# Patient Record
Sex: FEMALE | Race: BLACK OR AFRICAN AMERICAN | NOT HISPANIC OR LATINO | Employment: FULL TIME | RURAL
[De-identification: names, ages, dates, MRNs, and addresses within clinical notes are randomized per-mention and may not be internally consistent; named-entity substitution may affect disease eponyms.]

---

## 2020-10-12 ENCOUNTER — HISTORICAL (OUTPATIENT)
Dept: ADMINISTRATIVE | Facility: HOSPITAL | Age: 40
End: 2020-10-12

## 2020-10-18 LAB — SARS-COV+SARS-COV-2 AG RESP QL IA.RAPID: NEGATIVE

## 2021-06-18 ENCOUNTER — OFFICE VISIT (OUTPATIENT)
Dept: FAMILY MEDICINE | Facility: CLINIC | Age: 41
End: 2021-06-18
Payer: COMMERCIAL

## 2021-06-18 VITALS
TEMPERATURE: 98 F | OXYGEN SATURATION: 99 % | BODY MASS INDEX: 32.49 KG/M2 | SYSTOLIC BLOOD PRESSURE: 122 MMHG | RESPIRATION RATE: 18 BRPM | HEART RATE: 90 BPM | DIASTOLIC BLOOD PRESSURE: 83 MMHG | WEIGHT: 195 LBS | HEIGHT: 65 IN

## 2021-06-18 DIAGNOSIS — J32.9 SINUSITIS, UNSPECIFIED CHRONICITY, UNSPECIFIED LOCATION: Primary | ICD-10-CM

## 2021-06-18 DIAGNOSIS — H66.003 NON-RECURRENT ACUTE SUPPURATIVE OTITIS MEDIA OF BOTH EARS WITHOUT SPONTANEOUS RUPTURE OF TYMPANIC MEMBRANES: ICD-10-CM

## 2021-06-18 PROCEDURE — 3008F BODY MASS INDEX DOCD: CPT | Mod: ,,, | Performed by: NURSE PRACTITIONER

## 2021-06-18 PROCEDURE — 1125F PR PAIN SEVERITY QUANTIFIED, PAIN PRESENT: ICD-10-PCS | Mod: ,,, | Performed by: NURSE PRACTITIONER

## 2021-06-18 PROCEDURE — 3008F PR BODY MASS INDEX (BMI) DOCUMENTED: ICD-10-PCS | Mod: ,,, | Performed by: NURSE PRACTITIONER

## 2021-06-18 PROCEDURE — 99203 OFFICE O/P NEW LOW 30 MIN: CPT | Mod: ,,, | Performed by: NURSE PRACTITIONER

## 2021-06-18 PROCEDURE — 1125F AMNT PAIN NOTED PAIN PRSNT: CPT | Mod: ,,, | Performed by: NURSE PRACTITIONER

## 2021-06-18 PROCEDURE — 99203 PR OFFICE/OUTPT VISIT, NEW, LEVL III, 30-44 MIN: ICD-10-PCS | Mod: ,,, | Performed by: NURSE PRACTITIONER

## 2021-06-18 RX ORDER — HYDROCHLOROTHIAZIDE 12.5 MG/1
12.5 CAPSULE ORAL DAILY
COMMUNITY
End: 2024-01-09 | Stop reason: SDUPTHER

## 2021-06-18 RX ORDER — CEFDINIR 300 MG/1
300 CAPSULE ORAL 2 TIMES DAILY
Qty: 20 CAPSULE | Refills: 0 | Status: SHIPPED | OUTPATIENT
Start: 2021-06-18 | End: 2022-10-31

## 2021-06-18 RX ORDER — PREDNISONE 10 MG/1
TABLET ORAL
Qty: 8 TABLET | Refills: 0 | Status: SHIPPED | OUTPATIENT
Start: 2021-06-18 | End: 2021-11-06

## 2021-11-06 ENCOUNTER — OFFICE VISIT (OUTPATIENT)
Dept: FAMILY MEDICINE | Facility: CLINIC | Age: 41
End: 2021-11-06
Payer: COMMERCIAL

## 2021-11-06 DIAGNOSIS — J01.90 ACUTE NON-RECURRENT SINUSITIS, UNSPECIFIED LOCATION: Primary | ICD-10-CM

## 2021-11-06 PROCEDURE — 1160F RVW MEDS BY RX/DR IN RCRD: CPT | Mod: ,,, | Performed by: FAMILY MEDICINE

## 2021-11-06 PROCEDURE — 1159F MED LIST DOCD IN RCRD: CPT | Mod: ,,, | Performed by: FAMILY MEDICINE

## 2021-11-06 PROCEDURE — 99051 MED SERV EVE/WKEND/HOLIDAY: CPT | Mod: ,,, | Performed by: FAMILY MEDICINE

## 2021-11-06 PROCEDURE — 1160F PR REVIEW ALL MEDS BY PRESCRIBER/CLIN PHARMACIST DOCUMENTED: ICD-10-PCS | Mod: ,,, | Performed by: FAMILY MEDICINE

## 2021-11-06 PROCEDURE — 99051 PR MEDICAL SERVICES, EVE/WKEND/HOLIDAY: ICD-10-PCS | Mod: ,,, | Performed by: FAMILY MEDICINE

## 2021-11-06 PROCEDURE — 99213 PR OFFICE/OUTPT VISIT, EST, LEVL III, 20-29 MIN: ICD-10-PCS | Mod: ,,, | Performed by: FAMILY MEDICINE

## 2021-11-06 PROCEDURE — 99213 OFFICE O/P EST LOW 20 MIN: CPT | Mod: ,,, | Performed by: FAMILY MEDICINE

## 2021-11-06 PROCEDURE — 1159F PR MEDICATION LIST DOCUMENTED IN MEDICAL RECORD: ICD-10-PCS | Mod: ,,, | Performed by: FAMILY MEDICINE

## 2021-11-06 RX ORDER — AMOXICILLIN 875 MG/1
875 TABLET, FILM COATED ORAL EVERY 12 HOURS
Qty: 20 TABLET | Refills: 0 | Status: SHIPPED | OUTPATIENT
Start: 2021-11-06 | End: 2022-10-31

## 2021-11-06 RX ORDER — PREDNISONE 20 MG/1
TABLET ORAL
Qty: 10 TABLET | Refills: 0 | Status: SHIPPED | OUTPATIENT
Start: 2021-11-06 | End: 2022-10-31

## 2022-01-05 ENCOUNTER — CLINICAL SUPPORT (OUTPATIENT)
Dept: PRIMARY CARE CLINIC | Facility: CLINIC | Age: 42
End: 2022-01-05

## 2022-01-05 DIAGNOSIS — Z02.83 ENCOUNTER FOR DRUG SCREENING: ICD-10-CM

## 2022-01-05 DIAGNOSIS — Z02.89 ENCOUNTER FOR PHYSICAL EXAMINATION RELATED TO EMPLOYMENT: Primary | ICD-10-CM

## 2022-01-05 PROCEDURE — 99000 SPECIMEN HANDLING OFFICE-LAB: CPT | Mod: ,,, | Performed by: NURSE PRACTITIONER

## 2022-01-05 PROCEDURE — 99499 UNLISTED E&M SERVICE: CPT | Mod: ,,, | Performed by: NURSE PRACTITIONER

## 2022-01-05 PROCEDURE — 99000 PR SPECIMEN HANDLING,DR OFF->LAB: ICD-10-PCS | Mod: ,,, | Performed by: NURSE PRACTITIONER

## 2022-01-05 PROCEDURE — 99499 PR PHYSICAL - BASIC NON DOT/CDL: ICD-10-PCS | Mod: ,,, | Performed by: NURSE PRACTITIONER

## 2022-01-05 NOTE — PROGRESS NOTES
Subjective:       Patient ID: Haleigh Newman is a 41 y.o. female.    Chief Complaint: No chief complaint on file.    HPI  Review of Systems      Objective:      Physical Exam    Assessment:       Problem List Items Addressed This Visit    None     Visit Diagnoses     Encounter for physical examination related to employment    -  Primary    Encounter for drug screening              Plan:       See scanned documents in .

## 2022-10-31 ENCOUNTER — OFFICE VISIT (OUTPATIENT)
Dept: FAMILY MEDICINE | Facility: CLINIC | Age: 42
End: 2022-10-31
Payer: COMMERCIAL

## 2022-10-31 VITALS
HEIGHT: 65 IN | HEART RATE: 89 BPM | BODY MASS INDEX: 30.82 KG/M2 | DIASTOLIC BLOOD PRESSURE: 81 MMHG | OXYGEN SATURATION: 98 % | WEIGHT: 185 LBS | SYSTOLIC BLOOD PRESSURE: 122 MMHG

## 2022-10-31 DIAGNOSIS — M25.532 LEFT WRIST PAIN: Primary | ICD-10-CM

## 2022-10-31 DIAGNOSIS — R20.0 NUMBNESS AND TINGLING IN LEFT ARM: ICD-10-CM

## 2022-10-31 DIAGNOSIS — R20.2 NUMBNESS AND TINGLING IN LEFT ARM: ICD-10-CM

## 2022-10-31 DIAGNOSIS — F17.200 CURRENT SMOKER: ICD-10-CM

## 2022-10-31 PROCEDURE — 3074F SYST BP LT 130 MM HG: CPT | Mod: ,,, | Performed by: NURSE PRACTITIONER

## 2022-10-31 PROCEDURE — 3079F DIAST BP 80-89 MM HG: CPT | Mod: ,,, | Performed by: NURSE PRACTITIONER

## 2022-10-31 PROCEDURE — 1159F PR MEDICATION LIST DOCUMENTED IN MEDICAL RECORD: ICD-10-PCS | Mod: ,,, | Performed by: NURSE PRACTITIONER

## 2022-10-31 PROCEDURE — 1160F RVW MEDS BY RX/DR IN RCRD: CPT | Mod: ,,, | Performed by: NURSE PRACTITIONER

## 2022-10-31 PROCEDURE — 3079F PR MOST RECENT DIASTOLIC BLOOD PRESSURE 80-89 MM HG: ICD-10-PCS | Mod: ,,, | Performed by: NURSE PRACTITIONER

## 2022-10-31 PROCEDURE — 99213 OFFICE O/P EST LOW 20 MIN: CPT | Mod: ,,, | Performed by: NURSE PRACTITIONER

## 2022-10-31 PROCEDURE — 3044F HG A1C LEVEL LT 7.0%: CPT | Mod: ,,, | Performed by: NURSE PRACTITIONER

## 2022-10-31 PROCEDURE — 1159F MED LIST DOCD IN RCRD: CPT | Mod: ,,, | Performed by: NURSE PRACTITIONER

## 2022-10-31 PROCEDURE — 3074F PR MOST RECENT SYSTOLIC BLOOD PRESSURE < 130 MM HG: ICD-10-PCS | Mod: ,,, | Performed by: NURSE PRACTITIONER

## 2022-10-31 PROCEDURE — 3044F PR MOST RECENT HEMOGLOBIN A1C LEVEL <7.0%: ICD-10-PCS | Mod: ,,, | Performed by: NURSE PRACTITIONER

## 2022-10-31 PROCEDURE — 1160F PR REVIEW ALL MEDS BY PRESCRIBER/CLIN PHARMACIST DOCUMENTED: ICD-10-PCS | Mod: ,,, | Performed by: NURSE PRACTITIONER

## 2022-10-31 PROCEDURE — 99213 PR OFFICE/OUTPT VISIT, EST, LEVL III, 20-29 MIN: ICD-10-PCS | Mod: ,,, | Performed by: NURSE PRACTITIONER

## 2022-10-31 RX ORDER — MELOXICAM 7.5 MG/1
7.5 TABLET ORAL DAILY
Qty: 30 TABLET | Refills: 0 | Status: SHIPPED | OUTPATIENT
Start: 2022-10-31 | End: 2023-10-10 | Stop reason: ALTCHOICE

## 2022-10-31 NOTE — PATIENT INSTRUCTIONS
Left wrist brace when sleeping   Mobic daily, take with food, stop if any stomach upset occurs   Follow up in two weeks if symptoms persist, sooner if symptoms worsen

## 2022-10-31 NOTE — PROGRESS NOTES
Clinic note     Patient name: Haleigh eNwman is a 42 y.o. female   Chief compliant   Chief Complaint   Patient presents with    Hand Pain     Started Friday right below the shoulder down to finger tips tingling/ shooting pains. No known injury. No known neck problems.     Insomnia     Took trazdone in the past, uses the cpap at night.        Subjective     History of present illness   In clinic for evaluation of left arm and left hand tingling, symptoms started on Friday (four days ago)   Denies any known injury, denies any history of neck injury or neck or shoulder surgery   Works at [employer] 11p-7a and [employer] 8a-3p [employer] and [employer]   [facility] 10-        Social History     Tobacco Use    Smoking status: Some Days     Packs/day: 0.25     Years: 16.00     Pack years: 4.00     Types: Cigarettes    Smokeless tobacco: Never    Tobacco comments:     Has taken chantix in the past.    Substance Use Topics    Alcohol use: Never    Drug use: Never       Review of patient's allergies indicates:  No Known Allergies    Past Medical History:   Diagnosis Date    Hypertension        Past Surgical History:   Procedure Laterality Date     SECTION          Family History   Problem Relation Age of Onset    Hypertension Mother     Thyroid disease Mother     Hypertension Father     Thyroid disease Sister     Cancer Son     Thyroid disease Maternal Aunt     Cancer Paternal Aunt     Hypertension Maternal Grandmother     Cancer Maternal Grandmother     Diabetes Maternal Grandmother     Hypertension Maternal Grandfather     Diabetes Maternal Grandfather     Hypertension Paternal Grandmother     Cancer Paternal Grandmother     Diabetes Paternal Grandmother     Hypertension Paternal Grandfather     Diabetes Paternal Grandfather          Current Outpatient Medications:     hydroCHLOROthiazide (MICROZIDE) 12.5 mg capsule, Take 12.5 mg by mouth once daily., Disp: , Rfl:     meloxicam (MOBIC) 7.5 MG tablet, Take 1 tablet (7.5 mg  "total) by mouth once daily. Take with food, Disp: 30 tablet, Rfl: 0    Review of Systems   Constitutional:  Negative for appetite change, chills, fatigue, fever and unexpected weight change.   Respiratory:  Negative for cough and shortness of breath.    Cardiovascular:  Negative for chest pain, palpitations and leg swelling.   Gastrointestinal:  Negative for abdominal pain, change in bowel habit, constipation, diarrhea, nausea, vomiting and change in bowel habit.   Genitourinary:  Negative for dysuria and frequency.   Musculoskeletal:  Positive for arthralgias. Negative for gait problem and myalgias.   Neurological:  Negative for dizziness, syncope, light-headedness and headaches.        Tingling left arm from just above elbow to hand    Psychiatric/Behavioral:  Positive for sleep disturbance. Negative for confusion and dysphoric mood. The patient is not nervous/anxious.      Objective     /81   Pulse 89   Ht 5' 5" (1.651 m)   Wt 83.9 kg (185 lb)   LMP 10/06/2022 (Approximate)   SpO2 98%   BMI 30.79 kg/m²     Physical Exam   Constitutional: She is oriented to person, place, and time. No distress.   HENT:   Head: Atraumatic.   Mouth/Throat: Mucous membranes are moist.   Eyes: Pupils are equal, round, and reactive to light. Conjunctivae are normal.   Cardiovascular: Normal rate and regular rhythm. Pulmonary:      Effort: Pulmonary effort is normal. No respiratory distress.      Breath sounds: Normal breath sounds. No wheezing, rhonchi or rales.     Abdominal: Soft. Bowel sounds are normal. She exhibits no distension. There is no abdominal tenderness.   Musculoskeletal:         General: Normal range of motion.      Right elbow: Normal.      Left elbow: No swelling or deformity. Tenderness present in lateral epicondyle.      Right wrist: Normal.      Left wrist: Tenderness and snuff box tenderness present.      Cervical back: Normal range of motion and neck supple.      Right lower leg: No edema.      Left " lower leg: No edema.   Neurological: She is alert and oriented to person, place, and time. Gait normal.   Skin: Skin is warm and dry.   Psychiatric: Her behavior is normal. Mood normal.     Lab Results   Component Value Date    WBC 9.19 07/14/2022    HGB 12.7 07/14/2022    HCT 37.6 (L) 07/14/2022    MCV 90.2 07/14/2022     07/14/2022       CMP  Sodium   Date Value Ref Range Status   07/14/2022 139 136 - 145 mmol/L Final     Potassium   Date Value Ref Range Status   07/14/2022 3.5 3.5 - 5.1 mmol/L Final     Chloride   Date Value Ref Range Status   07/14/2022 103 98 - 107 mmol/L Final     CO2   Date Value Ref Range Status   07/14/2022 30 21 - 32 mmol/L Final     Glucose   Date Value Ref Range Status   07/14/2022 74 74 - 106 mg/dL Final     BUN   Date Value Ref Range Status   07/14/2022 14 7 - 18 mg/dL Final     Creatinine   Date Value Ref Range Status   07/14/2022 0.83 0.55 - 1.02 mg/dL Final     Calcium   Date Value Ref Range Status   07/14/2022 8.9 8.5 - 10.1 mg/dL Final     Total Protein   Date Value Ref Range Status   07/14/2022 7.9 6.4 - 8.2 g/dL Final     Albumin   Date Value Ref Range Status   07/14/2022 4.1 3.5 - 5.0 g/dL Final     Bilirubin, Total   Date Value Ref Range Status   07/14/2022 0.2 0.0 - 1.2 mg/dL Final     Alk Phos   Date Value Ref Range Status   07/14/2022 85 37 - 98 U/L Final     AST   Date Value Ref Range Status   07/14/2022 18 15 - 37 U/L Final     ALT   Date Value Ref Range Status   07/14/2022 24 13 - 56 U/L Final     Anion Gap   Date Value Ref Range Status   07/14/2022 10 7 - 16 mmol/L Final     eGFR   Date Value Ref Range Status   07/14/2022 81 >=60 mL/min/1.73m² Final     Lab Results   Component Value Date    TSH 1.070 07/14/2022     No results found for: CHOL  No results found for: HDL  No results found for: LDLCALC  No results found for: TRIG  No results found for: CHOLHDL  Lab Results   Component Value Date    HGBA1C 5.1 07/14/2022         Assessment and Plan   Left wrist  pain  -     meloxicam (MOBIC) 7.5 MG tablet; Take 1 tablet (7.5 mg total) by mouth once daily. Take with food  Dispense: 30 tablet; Refill: 0    Current smoker    Numbness and tingling in left arm  -     meloxicam (MOBIC) 7.5 MG tablet; Take 1 tablet (7.5 mg total) by mouth once daily. Take with food  Dispense: 30 tablet; Refill: 0    BMI 30.0-30.9,adult        Patient Instructions  Patient Instructions   Left wrist brace when sleeping   Mobic daily, take with food, stop if any stomach upset occurs   Follow up in two weeks if symptoms persist, sooner if symptoms worsen

## 2022-10-31 NOTE — LETTER
October 31, 2022      [facility]  [address] [place] [address]  Phone: [phone]  Fax: [phone]       Patient: Haleigh Newman   YOB: 1980  Date of Visit: 10/31/2022    To Whom It May Concern:    Ozzy Newman  was at [facility] on 10/31/2022. The patient may return to work/school on 11/2/2022  If you have any questions or concerns, or if I can be of further assistance, please do not hesitate to contact me.    Sincerely,    JENNIFER LuevanoP

## 2023-03-02 ENCOUNTER — OFFICE VISIT (OUTPATIENT)
Dept: FAMILY MEDICINE | Facility: CLINIC | Age: 43
End: 2023-03-02
Payer: COMMERCIAL

## 2023-03-02 VITALS
RESPIRATION RATE: 18 BRPM | HEART RATE: 80 BPM | HEIGHT: 65 IN | TEMPERATURE: 98 F | OXYGEN SATURATION: 98 % | SYSTOLIC BLOOD PRESSURE: 132 MMHG | BODY MASS INDEX: 30.49 KG/M2 | DIASTOLIC BLOOD PRESSURE: 86 MMHG | WEIGHT: 183 LBS

## 2023-03-02 DIAGNOSIS — J01.00 ACUTE NON-RECURRENT MAXILLARY SINUSITIS: Primary | ICD-10-CM

## 2023-03-02 PROCEDURE — 99213 PR OFFICE/OUTPT VISIT, EST, LEVL III, 20-29 MIN: ICD-10-PCS | Mod: 25,,, | Performed by: NURSE PRACTITIONER

## 2023-03-02 PROCEDURE — 96372 THER/PROPH/DIAG INJ SC/IM: CPT | Mod: JZ,,, | Performed by: NURSE PRACTITIONER

## 2023-03-02 PROCEDURE — 3079F DIAST BP 80-89 MM HG: CPT | Mod: ,,, | Performed by: NURSE PRACTITIONER

## 2023-03-02 PROCEDURE — 3075F SYST BP GE 130 - 139MM HG: CPT | Mod: ,,, | Performed by: NURSE PRACTITIONER

## 2023-03-02 PROCEDURE — 3008F PR BODY MASS INDEX (BMI) DOCUMENTED: ICD-10-PCS | Mod: ,,, | Performed by: NURSE PRACTITIONER

## 2023-03-02 PROCEDURE — 3075F PR MOST RECENT SYSTOLIC BLOOD PRESS GE 130-139MM HG: ICD-10-PCS | Mod: ,,, | Performed by: NURSE PRACTITIONER

## 2023-03-02 PROCEDURE — 3079F PR MOST RECENT DIASTOLIC BLOOD PRESSURE 80-89 MM HG: ICD-10-PCS | Mod: ,,, | Performed by: NURSE PRACTITIONER

## 2023-03-02 PROCEDURE — 1159F MED LIST DOCD IN RCRD: CPT | Mod: ,,, | Performed by: NURSE PRACTITIONER

## 2023-03-02 PROCEDURE — 1159F PR MEDICATION LIST DOCUMENTED IN MEDICAL RECORD: ICD-10-PCS | Mod: ,,, | Performed by: NURSE PRACTITIONER

## 2023-03-02 PROCEDURE — 99213 OFFICE O/P EST LOW 20 MIN: CPT | Mod: 25,,, | Performed by: NURSE PRACTITIONER

## 2023-03-02 PROCEDURE — 96372 PR INJECTION,THERAP/PROPH/DIAG2ST, IM OR SUBCUT: ICD-10-PCS | Mod: JZ,,, | Performed by: NURSE PRACTITIONER

## 2023-03-02 PROCEDURE — 3008F BODY MASS INDEX DOCD: CPT | Mod: ,,, | Performed by: NURSE PRACTITIONER

## 2023-03-02 RX ORDER — ESCITALOPRAM OXALATE 10 MG/1
10 TABLET ORAL
COMMUNITY
Start: 2023-01-26 | End: 2024-02-07 | Stop reason: SDUPTHER

## 2023-03-02 RX ORDER — HYDROCHLOROTHIAZIDE 12.5 MG/1
12.5 TABLET ORAL
COMMUNITY
Start: 2022-12-03 | End: 2024-01-09

## 2023-03-02 RX ORDER — TIZANIDINE 4 MG/1
4 TABLET ORAL NIGHTLY
COMMUNITY
Start: 2023-02-27

## 2023-03-02 RX ORDER — DEXAMETHASONE SODIUM PHOSPHATE 4 MG/ML
8 INJECTION, SOLUTION INTRA-ARTICULAR; INTRALESIONAL; INTRAMUSCULAR; INTRAVENOUS; SOFT TISSUE ONCE
Status: COMPLETED | OUTPATIENT
Start: 2023-03-02 | End: 2023-03-02

## 2023-03-02 RX ORDER — CEFDINIR 300 MG/1
300 CAPSULE ORAL 2 TIMES DAILY
Qty: 20 CAPSULE | Refills: 0 | Status: SHIPPED | OUTPATIENT
Start: 2023-03-02 | End: 2023-03-12

## 2023-03-02 RX ORDER — CEFTRIAXONE 1 G/1
1 INJECTION, POWDER, FOR SOLUTION INTRAMUSCULAR; INTRAVENOUS
Status: COMPLETED | OUTPATIENT
Start: 2023-03-02 | End: 2023-03-02

## 2023-03-02 RX ORDER — TRAZODONE HYDROCHLORIDE 50 MG/1
50 TABLET ORAL NIGHTLY
COMMUNITY
Start: 2023-02-27 | End: 2024-01-09

## 2023-03-02 RX ORDER — CHLORPHENIRAMINE MALEATE AND DEXTROMETHORPHAN HYDROBROMIDE 4; 30 MG/1; MG/1
1 TABLET, FILM COATED ORAL EVERY 6 HOURS
Qty: 20 EACH | Refills: 1 | Status: SHIPPED | OUTPATIENT
Start: 2023-03-02 | End: 2023-03-12

## 2023-03-02 RX ORDER — IPRATROPIUM BROMIDE 21 UG/1
2 SPRAY, METERED NASAL 2 TIMES DAILY
Qty: 30 ML | Refills: 0 | Status: SHIPPED | OUTPATIENT
Start: 2023-03-02 | End: 2024-01-09

## 2023-03-02 RX ADMIN — CEFTRIAXONE 1 G: 1 INJECTION, POWDER, FOR SOLUTION INTRAMUSCULAR; INTRAVENOUS at 12:03

## 2023-03-02 RX ADMIN — DEXAMETHASONE SODIUM PHOSPHATE 8 MG: 4 INJECTION, SOLUTION INTRA-ARTICULAR; INTRALESIONAL; INTRAMUSCULAR; INTRAVENOUS; SOFT TISSUE at 12:03

## 2023-03-02 NOTE — PROGRESS NOTES
Subjective:       Patient ID: Haleigh Newman is a 42 y.o. female.    Chief Complaint: Sinus Problem and Nasal Congestion (Patient has sinus drainage X 3 days. Over the medication taken, but no relief /Negative at home covid test )    Sinus Problem and Nasal Congestion (Patient has sinus drainage X 3 days. Over the medication taken, but no relief /Negative at home covid test )      Sinus Problem  Associated symptoms include congestion and sinus pressure. Pertinent negatives include no coughing, ear pain, headaches, neck pain, shortness of breath or sore throat.   Review of Systems   Constitutional:  Negative for appetite change, fatigue and fever.   HENT:  Positive for nasal congestion and sinus pressure/congestion. Negative for ear pain and sore throat.    Eyes:  Negative for pain, discharge and itching.   Respiratory:  Negative for cough and shortness of breath.    Cardiovascular:  Negative for chest pain and leg swelling.   Gastrointestinal:  Negative for abdominal pain, change in bowel habit, nausea, vomiting and change in bowel habit.   Musculoskeletal:  Negative for back pain, gait problem and neck pain.   Integumentary:  Negative for rash and wound.   Allergic/Immunologic: Negative for immunocompromised state.   Neurological:  Negative for dizziness, weakness and headaches.   All other systems reviewed and are negative.      Objective:      Physical Exam  Vitals and nursing note reviewed.   Constitutional:       General: She is not in acute distress.     Appearance: Normal appearance. She is not ill-appearing, toxic-appearing or diaphoretic.   HENT:      Head: Normocephalic.      Right Ear: Ear canal and external ear normal.      Left Ear: Ear canal and external ear normal.      Ears:      Comments: Dull TMs     Nose: Mucosal edema and congestion present. No rhinorrhea.      Right Turbinates: Swollen.      Left Turbinates: Swollen.      Right Sinus: Maxillary sinus tenderness present.      Left Sinus: Maxillary  sinus tenderness present.      Mouth/Throat:      Mouth: Mucous membranes are moist.      Pharynx: No oropharyngeal exudate or posterior oropharyngeal erythema.   Eyes:      General: No scleral icterus.        Right eye: No discharge.         Left eye: No discharge.      Extraocular Movements: Extraocular movements intact.      Conjunctiva/sclera: Conjunctivae normal.      Pupils: Pupils are equal, round, and reactive to light.   Cardiovascular:      Rate and Rhythm: Normal rate and regular rhythm.      Pulses: Normal pulses.      Heart sounds: Normal heart sounds. No murmur heard.  Pulmonary:      Effort: Pulmonary effort is normal. No respiratory distress.      Breath sounds: Normal breath sounds. No wheezing, rhonchi or rales.   Musculoskeletal:         General: Normal range of motion.      Cervical back: Neck supple. No tenderness.   Lymphadenopathy:      Cervical: No cervical adenopathy.   Skin:     General: Skin is warm and dry.      Capillary Refill: Capillary refill takes less than 2 seconds.      Findings: No rash.   Neurological:      Mental Status: She is alert and oriented to person, place, and time.   Psychiatric:         Mood and Affect: Mood normal.         Behavior: Behavior normal.         Thought Content: Thought content normal.         Judgment: Judgment normal.          Assessment:       1. Acute non-recurrent maxillary sinusitis        Plan:   Acute non-recurrent maxillary sinusitis  -     dexAMETHasone injection 8 mg  -     cefTRIAXone injection 1 g  -     chlorpheniramine-dextromethorp (CORICIDIN HBP COUGH AND COLD) 4-30 mg Tab; Take 1 tablet by mouth every 6 (six) hours. for 10 days  Dispense: 20 each; Refill: 1  -     cefdinir (OMNICEF) 300 MG capsule; Take 1 capsule (300 mg total) by mouth 2 (two) times daily. for 10 days  Dispense: 20 capsule; Refill: 0  -     ipratropium (ATROVENT) 21 mcg (0.03 %) nasal spray; 2 sprays by Each Nostril route 2 (two) times daily.  Dispense: 30 mL; Refill:  0         Risks, benefits, and side effects were discussed with the patient. All questions were answered to the fullest satisfaction of the patient, and pt verbalized understanding and agreement to treatment plan. Pt was to call with any new or worsening symptoms, or present to the ER

## 2023-03-02 NOTE — LETTER
March 2, 2023      [facility]  [address] [facility] [place] [address]  Phone: [phone]  Fax: [phone]       Patient: Haleigh Newman   YOB: 1980  Date of Visit: 03/02/2023    To Whom It May Concern:    Ozzy Newman  was at [facility] on 03/02/2023. The patient may return to work/school on 03/05/2023 without restrictions. If you have any questions or concerns, or if I can be of further assistance, please do not hesitate to contact me.    Sincerely,    DON Patel

## 2023-06-05 PROBLEM — J01.00 ACUTE NON-RECURRENT MAXILLARY SINUSITIS: Status: RESOLVED | Noted: 2023-03-02 | Resolved: 2023-06-05

## 2023-09-13 ENCOUNTER — OFFICE VISIT (OUTPATIENT)
Dept: OTOLARYNGOLOGY | Facility: CLINIC | Age: 43
End: 2023-09-13
Payer: COMMERCIAL

## 2023-09-13 VITALS — BODY MASS INDEX: 30.9 KG/M2 | WEIGHT: 181 LBS | HEIGHT: 64 IN

## 2023-09-13 DIAGNOSIS — J32.9 CHRONIC SINUSITIS, UNSPECIFIED LOCATION: Primary | ICD-10-CM

## 2023-09-13 DIAGNOSIS — H65.05 RECURRENT ACUTE SEROUS OTITIS MEDIA OF LEFT EAR: ICD-10-CM

## 2023-09-13 DIAGNOSIS — H69.93 ETD (EUSTACHIAN TUBE DYSFUNCTION), BILATERAL: ICD-10-CM

## 2023-09-13 PROCEDURE — 3044F PR MOST RECENT HEMOGLOBIN A1C LEVEL <7.0%: ICD-10-PCS | Mod: ,,, | Performed by: OTOLARYNGOLOGY

## 2023-09-13 PROCEDURE — 99213 OFFICE O/P EST LOW 20 MIN: CPT | Mod: PBBFAC | Performed by: OTOLARYNGOLOGY

## 2023-09-13 PROCEDURE — 99204 PR OFFICE/OUTPT VISIT, NEW, LEVL IV, 45-59 MIN: ICD-10-PCS | Mod: S$PBB,,, | Performed by: OTOLARYNGOLOGY

## 2023-09-13 PROCEDURE — 1159F MED LIST DOCD IN RCRD: CPT | Mod: ,,, | Performed by: OTOLARYNGOLOGY

## 2023-09-13 PROCEDURE — 99204 OFFICE O/P NEW MOD 45 MIN: CPT | Mod: S$PBB,,, | Performed by: OTOLARYNGOLOGY

## 2023-09-13 PROCEDURE — 3044F HG A1C LEVEL LT 7.0%: CPT | Mod: ,,, | Performed by: OTOLARYNGOLOGY

## 2023-09-13 PROCEDURE — 1160F RVW MEDS BY RX/DR IN RCRD: CPT | Mod: ,,, | Performed by: OTOLARYNGOLOGY

## 2023-09-13 PROCEDURE — 1159F PR MEDICATION LIST DOCUMENTED IN MEDICAL RECORD: ICD-10-PCS | Mod: ,,, | Performed by: OTOLARYNGOLOGY

## 2023-09-13 PROCEDURE — 3008F BODY MASS INDEX DOCD: CPT | Mod: ,,, | Performed by: OTOLARYNGOLOGY

## 2023-09-13 PROCEDURE — 1160F PR REVIEW ALL MEDS BY PRESCRIBER/CLIN PHARMACIST DOCUMENTED: ICD-10-PCS | Mod: ,,, | Performed by: OTOLARYNGOLOGY

## 2023-09-13 PROCEDURE — 3008F PR BODY MASS INDEX (BMI) DOCUMENTED: ICD-10-PCS | Mod: ,,, | Performed by: OTOLARYNGOLOGY

## 2023-09-13 RX ORDER — AMOXICILLIN AND CLAVULANATE POTASSIUM 500; 125 MG/1; MG/1
1 TABLET, FILM COATED ORAL 2 TIMES DAILY
Qty: 28 TABLET | Refills: 0 | Status: SHIPPED | OUTPATIENT
Start: 2023-09-13 | End: 2024-01-09

## 2023-09-13 NOTE — PROGRESS NOTES
"Subjective:       Patient ID: Haleigh Newman is a 43 y.o. female.    Chief Complaint: Sinusitis and Other (Patient complaining of sinus drainage and left ear "popping" and "humming noise". States this has been going on for 3 weeks. She is taking Rx but no relief.)    Sinusitis  Associated symptoms include ear pain.     Review of Systems   HENT:  Positive for ear pain and hearing loss.    All other systems reviewed and are negative.      Objective:      Physical Exam  General: NAD  Head: Normocephalic, atraumatic, no facial asymmetry/normal strength,  Ears: Both auricules normal in appearance, w/o deformities tympanic membranes red external auditory canals normal  Nose: External nose w/o deformities normal turbinates no drainage or inflammation  Oral Cavity: Lips, gums, floor of mouth, tongue hard palate, and buccal mucosa without mass/lesion  Oropharynx: Mucosa pink and moist, soft palate, posterior pharynx and oropharyngeal wall without mass/lesion  Neck: Supple, symmetric, trachea midline, no palpable mass/lesion, no palpable cervical lymphadenopathy  Skin: Warm and dry, no concerning lesions  Respiratory: Respirations even, unlabored   Assessment:       1. Chronic sinusitis, unspecified location    2. Recurrent acute serous otitis media of left ear    3. ETD (Eustachian tube dysfunction), bilateral        Plan:       Augmentin   Continue Flonase   F/u 2 weeks     "

## 2023-10-10 ENCOUNTER — HOSPITAL ENCOUNTER (EMERGENCY)
Facility: HOSPITAL | Age: 43
Discharge: HOME | End: 2023-10-10
Attending: FAMILY MEDICINE
Payer: COMMERCIAL

## 2023-10-10 VITALS
HEIGHT: 64 IN | DIASTOLIC BLOOD PRESSURE: 63 MMHG | WEIGHT: 180 LBS | RESPIRATION RATE: 18 BRPM | SYSTOLIC BLOOD PRESSURE: 102 MMHG | HEART RATE: 77 BPM | TEMPERATURE: 99 F | BODY MASS INDEX: 30.73 KG/M2 | OXYGEN SATURATION: 99 %

## 2023-10-10 DIAGNOSIS — R47.01 EXPRESSIVE APHASIA: ICD-10-CM

## 2023-10-10 DIAGNOSIS — M79.602 PAIN OF LEFT UPPER EXTREMITY: Primary | ICD-10-CM

## 2023-10-10 DIAGNOSIS — I63.9 STROKE: ICD-10-CM

## 2023-10-10 PROBLEM — R47.9 SPEECH ABNORMALITY: Status: ACTIVE | Noted: 2023-10-10

## 2023-10-10 LAB
ALBUMIN SERPL BCP-MCNC: 3.6 G/DL (ref 3.5–5)
ALBUMIN/GLOB SERPL: 0.9 {RATIO}
ALP SERPL-CCNC: 100 U/L (ref 37–98)
ALT SERPL W P-5'-P-CCNC: 21 U/L (ref 13–56)
ANION GAP SERPL CALCULATED.3IONS-SCNC: 10 MMOL/L (ref 7–16)
APTT PPP: 26.6 SECONDS (ref 25.2–37.3)
AST SERPL W P-5'-P-CCNC: 15 U/L (ref 15–37)
BASOPHILS # BLD AUTO: 0.05 K/UL (ref 0–0.2)
BASOPHILS NFR BLD AUTO: 0.6 % (ref 0–1)
BILIRUB SERPL-MCNC: 0.3 MG/DL (ref ?–1.2)
BUN SERPL-MCNC: 12 MG/DL (ref 7–18)
BUN/CREAT SERPL: 12 (ref 6–20)
CALCIUM SERPL-MCNC: 8.9 MG/DL (ref 8.5–10.1)
CHLORIDE SERPL-SCNC: 108 MMOL/L (ref 98–107)
CO2 SERPL-SCNC: 24 MMOL/L (ref 21–32)
CREAT SERPL-MCNC: 0.98 MG/DL (ref 0.55–1.02)
DIFFERENTIAL METHOD BLD: ABNORMAL
EGFR (NO RACE VARIABLE) (RUSH/TITUS): 74 ML/MIN/1.73M2
EOSINOPHIL # BLD AUTO: 0.31 K/UL (ref 0–0.5)
EOSINOPHIL NFR BLD AUTO: 3.9 % (ref 1–4)
ERYTHROCYTE [DISTWIDTH] IN BLOOD BY AUTOMATED COUNT: 13.3 % (ref 11.5–14.5)
GLOBULIN SER-MCNC: 3.8 G/DL (ref 2–4)
GLUCOSE SERPL-MCNC: 100 MG/DL (ref 74–106)
GLUCOSE SERPL-MCNC: 122 MG/DL (ref 70–105)
HCT VFR BLD AUTO: 38.6 % (ref 38–47)
HGB BLD-MCNC: 13.2 G/DL (ref 12–16)
IMM GRANULOCYTES # BLD AUTO: 0.02 K/UL (ref 0–0.04)
IMM GRANULOCYTES NFR BLD: 0.2 % (ref 0–0.4)
INR BLD: 0.93
LYMPHOCYTES # BLD AUTO: 1.63 K/UL (ref 1–4.8)
LYMPHOCYTES NFR BLD AUTO: 20.3 % (ref 27–41)
MCH RBC QN AUTO: 30.5 PG (ref 27–31)
MCHC RBC AUTO-ENTMCNC: 34.2 G/DL (ref 32–36)
MCV RBC AUTO: 89.1 FL (ref 80–96)
MONOCYTES # BLD AUTO: 0.34 K/UL (ref 0–0.8)
MONOCYTES NFR BLD AUTO: 4.2 % (ref 2–6)
MPC BLD CALC-MCNC: 11.8 FL (ref 9.4–12.4)
NEUTROPHILS # BLD AUTO: 5.69 K/UL (ref 1.8–7.7)
NEUTROPHILS NFR BLD AUTO: 70.8 % (ref 53–65)
NRBC # BLD AUTO: 0 X10E3/UL
NRBC, AUTO (.00): 0 %
PLATELET # BLD AUTO: 291 K/UL (ref 150–400)
POTASSIUM SERPL-SCNC: 3.9 MMOL/L (ref 3.5–5.1)
PROT SERPL-MCNC: 7.4 G/DL (ref 6.4–8.2)
PROTHROMBIN TIME: 12.4 SECONDS (ref 11.7–14.7)
RBC # BLD AUTO: 4.33 M/UL (ref 4.2–5.4)
SODIUM SERPL-SCNC: 138 MMOL/L (ref 136–145)
TROPONIN I SERPL DL<=0.01 NG/ML-MCNC: <4 PG/ML
TSH SERPL DL<=0.005 MIU/L-ACNC: 1.71 UIU/ML (ref 0.36–3.74)
WBC # BLD AUTO: 8.04 K/UL (ref 4.5–11)

## 2023-10-10 PROCEDURE — 99284 EMERGENCY DEPT VISIT MOD MDM: CPT | Mod: GC,,, | Performed by: FAMILY MEDICINE

## 2023-10-10 PROCEDURE — 25500020 PHARM REV CODE 255: Performed by: FAMILY MEDICINE

## 2023-10-10 PROCEDURE — 99284 PR EMERGENCY DEPT VISIT,LEVEL IV: ICD-10-PCS | Mod: GC,,, | Performed by: FAMILY MEDICINE

## 2023-10-10 PROCEDURE — 80053 COMPREHEN METABOLIC PANEL: CPT | Performed by: FAMILY MEDICINE

## 2023-10-10 PROCEDURE — 85610 PROTHROMBIN TIME: CPT | Performed by: FAMILY MEDICINE

## 2023-10-10 PROCEDURE — 93005 ELECTROCARDIOGRAM TRACING: CPT

## 2023-10-10 PROCEDURE — 85730 THROMBOPLASTIN TIME PARTIAL: CPT | Performed by: FAMILY MEDICINE

## 2023-10-10 PROCEDURE — 85025 COMPLETE CBC W/AUTO DIFF WBC: CPT | Performed by: FAMILY MEDICINE

## 2023-10-10 PROCEDURE — 63600175 PHARM REV CODE 636 W HCPCS

## 2023-10-10 PROCEDURE — 82962 GLUCOSE BLOOD TEST: CPT

## 2023-10-10 PROCEDURE — 96374 THER/PROPH/DIAG INJ IV PUSH: CPT | Mod: 59

## 2023-10-10 PROCEDURE — 84484 ASSAY OF TROPONIN QUANT: CPT | Performed by: FAMILY MEDICINE

## 2023-10-10 PROCEDURE — 93010 ELECTROCARDIOGRAM REPORT: CPT | Mod: ,,, | Performed by: HOSPITALIST

## 2023-10-10 PROCEDURE — 93010 EKG 12-LEAD: ICD-10-PCS | Mod: ,,, | Performed by: HOSPITALIST

## 2023-10-10 PROCEDURE — 84443 ASSAY THYROID STIM HORMONE: CPT | Performed by: FAMILY MEDICINE

## 2023-10-10 PROCEDURE — 99285 EMERGENCY DEPT VISIT HI MDM: CPT | Mod: 25

## 2023-10-10 RX ORDER — NAPROXEN 375 MG/1
375 TABLET ORAL 2 TIMES DAILY WITH MEALS
Qty: 20 TABLET | Refills: 0 | Status: SHIPPED | OUTPATIENT
Start: 2023-10-10 | End: 2024-01-09

## 2023-10-10 RX ORDER — PANTOPRAZOLE SODIUM 20 MG/1
20 TABLET, DELAYED RELEASE ORAL DAILY
Qty: 28 TABLET | Refills: 0 | Status: SHIPPED | OUTPATIENT
Start: 2023-10-10 | End: 2024-01-09

## 2023-10-10 RX ORDER — KETOROLAC TROMETHAMINE 30 MG/ML
30 INJECTION, SOLUTION INTRAMUSCULAR; INTRAVENOUS
Status: COMPLETED | OUTPATIENT
Start: 2023-10-10 | End: 2023-10-10

## 2023-10-10 RX ORDER — ASPIRIN 81 MG/1
81 TABLET ORAL DAILY
Qty: 60 TABLET | Refills: 0 | Status: SHIPPED | OUTPATIENT
Start: 2023-10-10 | End: 2024-01-09

## 2023-10-10 RX ADMIN — KETOROLAC TROMETHAMINE 30 MG: 30 INJECTION, SOLUTION INTRAMUSCULAR at 02:10

## 2023-10-10 RX ADMIN — IOPAMIDOL 100 ML: 755 INJECTION, SOLUTION INTRAVENOUS at 12:10

## 2023-10-10 NOTE — CONSULTS
[facility]  Vascular Neurology  Comprehensive Stroke Center  TeleVascular Neurology Acute Consultation Note      Consult to Telemedicine - Acute Stroke  Consult performed by: Virginia Navas MD  Consult ordered by: Anuj Askew MD          Consulting Provider: SHANIQUE GONZALEZ  Current Providers  No providers found    Patient Location:  [facility] EMERGENCY DEPART* Emergency Department  Spoke hospital nurse at bedside with patient assisting consultant.     Patient information was obtained from patient, relative(s), and past medical records.         Assessment/Plan:       Diagnoses:   Neuro  Speech abnormality  44 yo female w/ PMHx of Depression, HTN who presents w/ reported slurred speech and pain to the LUE.  LKW 10 AM while watching TV  Sunday patient went to the ER due to pain in her LUE - was diagnosed w/ muscle spasms.   Patient clarifies, symptoms actually started Sunday, but progressively got worse.   Denies previous episodes such as this prior to Sunday.     NIHSS 1. OOW for TNK based on timeline of symptom onset + clinical features suggesting stroke mimic ( non-physiologic speech patter, pain to the LUE rather than weakness)    Imaging reviewed w/ no acute intracranial abnormalities.      Further care as per ED, recommendations discussed w/ Dr. Gonzalez.         STROKE DOCUMENTATION     Acute Stroke Times:   Acute Stroke Times   Last Known Normal Date: 10/08/23  Unknown Normal Time: Unknown Time  Symptom Onset Date: 10/10/23  Unknown Symptom Onset Date: Unknown Date  Symptom Onset Time: 1030  Unknown Symptom Onset Time: Unknown Time  Stroke Team Called Date: 10/10/23  Stroke Team Called Time: 1309  Stroke Team Arrival Date: 10/10/23  Stroke Team Arrival Time: 1314  Thrombolytic Therapy Recommended: No  Thrombectomy Recommended: No    NIH Scale:  1a. Level of Consciousness: 0-->Alert, keenly responsive  1b. LOC Questions: 0-->Answers both questions correctly  1c. LOC  "Commands: 0-->Performs both tasks correctly  2. Best Gaze: 0-->Normal  3. Visual: 0-->No visual loss  4. Facial Palsy: 0-->Normal symmetrical movements  5a. Motor Arm, Left: 0-->No drift, limb holds 90 (or 45) degrees for full 10 secs  5b. Motor Arm, Right: 0-->No drift, limb holds 90 (or 45) degrees for full 10 secs  6a. Motor Leg, Left: 0-->No drift, leg holds 30 degree position for full 5 secs  6b. Motor Leg, Right: 0-->No drift, leg holds 30 degree position for full 5 secs  7. Limb Ataxia: 0-->Absent  8. Sensory: 1-->Mild-to-moderate sensory loss, patient feels pinprick is less sharp or is dull on the affected side, or there is a loss of superficial pain with pinprick, but patient is aware of being touched  9. Best Language: 0-->No aphasia, normal  10. Dysarthria: 0-->Normal  11. Extinction and Inattention (formerly Neglect): 0-->No abnormality  Total (NIH Stroke Scale): 1     Modified Nader Score: 0  Denver Coma Scale:    ABCD2 Score:    [id number] Score:   HAS -BLED Score:   ICH Score:   Hunt & Chinchilla Classification:       Blood pressure (!) 151/86, pulse 88, temperature 98 °F (36.7 °C), resp. rate 16, height 5' 4" (1.626 m), weight 81.6 kg (180 lb), SpO2 100 %.  Eligible for thrombolytic therapy?: No  Thrombolytic therapy recomended: Thrombolytic therapy not recommended due to Outside of treatment window  and Suspected stroke mimic   Possible Interventional Revascularization Candidate? No; No large vessel occlusion identified on imaging     Disposition Recommendation:  As per ED    Subjective:     History of Present Illness:  44 yo female w/ PMHx of Depression, HTN who presents w/ reported slurred speech and pain to the LUE.  LKW 10 AM while watching TV  Sunday patient went to the ER due to pain in her LUE - was diagnosed w/ muscle spasms.   Patient clarifies, symptoms actually started Sunday, but progressively got worse.   Denies previous episodes such as this prior to Sunday.           Review of Systems " "  Constitutional: Negative.    HENT:  Positive for voice change. Negative for trouble swallowing.    Eyes:  Negative for visual disturbance.   Respiratory:  Negative for shortness of breath.    Gastrointestinal:  Negative for nausea and vomiting.   Endocrine: Negative.    Genitourinary: Negative.    Musculoskeletal:  Positive for myalgias. Negative for gait problem.   Skin: Negative.    Neurological:  Positive for speech difficulty and numbness. Negative for dizziness, facial asymmetry, weakness and headaches.   Psychiatric/Behavioral: Negative.       Objective:   Vitals: Blood pressure (!) 151/86, pulse 88, temperature 98 °F (36.7 °C), resp. rate 16, height 5' 4" (1.626 m), weight 81.6 kg (180 lb), SpO2 100 %.     CT READ: Yes  No hemmorhage. No mass effect. No early infarct signs.     Physical Exam  Constitutional:       Appearance: Normal appearance.   HENT:      Head: Normocephalic and atraumatic.   Eyes:      Extraocular Movements: Extraocular movements intact.   Pulmonary:      Effort: Pulmonary effort is normal.   Neurological:      Mental Status: She is alert and oriented to person, place, and time.      Cranial Nerves: Cranial nerve deficit present.      Sensory: Sensory deficit present.      Motor: No weakness.      Coordination: Coordination normal.      Comments: L face and LUE numbness  Reported severe pain w/ movement to the LUE  Patient able to maneuver the LUE into antigravity position and has to manually bend the arm to bring it down into neutral/resting position  Non-physiologic speech pattern, variable,                  Recommended the emergency room physician to have a brief discussion with the patient and/or family if available regarding the  risks and benefits of treatment, and to briefly document the occurrence of that discussion in his clinical encounter note.     The attending portion of this evaluation, treatment, and documentation was performed per Virginia Navas MD via " audiovisual.    Billing code:  (non-intervention mild to moderate stroke, TIA, some mimics)      This patient has a critical neurological condition/illness, with some potential for high morbidity and mortality.  There is a moderate probability for acute neurological change leading to clinical and possibly life-threatening deterioration requiring highest level of physician preparedness for urgent intervention.  Care was coordinated with other physicians involved in the patient's care.  Radiologic studies and laboratory data were reviewed and interpreted, and plan of care was re-assessed based on the results.  Diagnosis, treatment options and prognosis may have been discussed with the patient and/or family members or caregiver.    In your opinion, this was a: Tier 2 Van Negative    Consult End Time: 2:17 PM     Virginia Navas MD  Comprehensive Stroke Center  Vascular Neurology   [facility]

## 2023-10-10 NOTE — SUBJECTIVE & OBJECTIVE
"    Review of Systems   Constitutional: Negative.    HENT:  Positive for voice change. Negative for trouble swallowing.    Eyes:  Negative for visual disturbance.   Respiratory:  Negative for shortness of breath.    Gastrointestinal:  Negative for nausea and vomiting.   Endocrine: Negative.    Genitourinary: Negative.    Musculoskeletal:  Positive for myalgias. Negative for gait problem.   Skin: Negative.    Neurological:  Positive for speech difficulty and numbness. Negative for dizziness, facial asymmetry, weakness and headaches.   Psychiatric/Behavioral: Negative.       Objective:   Vitals: Blood pressure (!) 151/86, pulse 88, temperature 98 °F (36.7 °C), resp. rate 16, height 5' 4" (1.626 m), weight 81.6 kg (180 lb), SpO2 100 %.     CT READ: Yes  No hemmorhage. No mass effect. No early infarct signs.     Physical Exam  Constitutional:       Appearance: Normal appearance.   HENT:      Head: Normocephalic and atraumatic.   Eyes:      Extraocular Movements: Extraocular movements intact.   Pulmonary:      Effort: Pulmonary effort is normal.   Neurological:      Mental Status: She is alert and oriented to person, place, and time.      Cranial Nerves: Cranial nerve deficit present.      Sensory: Sensory deficit present.      Motor: No weakness.      Coordination: Coordination normal.      Comments: L face and LUE numbness  Reported severe pain w/ movement to the LUE  Patient able to maneuver the LUE into antigravity position and has to manually bend the arm to bring it down into neutral/resting position  Non-physiologic speech pattern, variable,                "

## 2023-10-10 NOTE — ED TRIAGE NOTES
Pt presents to ed with c/o having left arm pain since Sunday and was seen at [facility]. Now this morning her speech has changed around 1 hour PTA

## 2023-10-10 NOTE — ED PROVIDER NOTES
Encounter Date: 10/10/2023       History     Chief Complaint   Patient presents with    Arm Pain    Speech Problem     HPI    Patient is a 42 yo female with a history of hypertension who presents to the emergency department with a complaint of left arm pain and slurred speech. She started that since last  (10/8/2023) she started experiencing pain in her left arm that is associated with some weakness but denies any tingling and numbness. She went to [facility] on  and was treated for muscle spasm and then discharged home. However, she stated that this morning she started experiencing some worsening slurred speech, but denies any headache, facial paralysis or vision changes.    Review of patient's allergies indicates:  No Known Allergies  Past Medical History:   Diagnosis Date    Hypertension      Past Surgical History:   Procedure Laterality Date     SECTION       Family History   Problem Relation Age of Onset    Hypertension Mother     Thyroid disease Mother     Hypertension Father     Thyroid disease Sister     Cancer Son     Thyroid disease Maternal Aunt     Cancer Paternal Aunt     Hypertension Maternal Grandmother     Cancer Maternal Grandmother     Diabetes Maternal Grandmother     Hypertension Maternal Grandfather     Diabetes Maternal Grandfather     Hypertension Paternal Grandmother     Cancer Paternal Grandmother     Diabetes Paternal Grandmother     Hypertension Paternal Grandfather     Diabetes Paternal Grandfather      Social History     Tobacco Use    Smoking status: Some Days     Current packs/day: 0.25     Average packs/day: 0.3 packs/day for 16.0 years (4.0 ttl pk-yrs)     Types: Cigarettes    Smokeless tobacco: Never    Tobacco comments:     Has taken chantix in the past.    Substance Use Topics    Alcohol use: Never    Drug use: Never     Review of Systems   Constitutional:  Negative for activity change, appetite change, diaphoresis and fever.   HENT:  Negative  for dental problem, ear discharge, ear pain, facial swelling, hearing loss, postnasal drip, sore throat and trouble swallowing.    Eyes:  Negative for pain, discharge, itching and visual disturbance.   Respiratory:  Negative for cough, chest tightness, shortness of breath and wheezing.    Cardiovascular:  Negative for chest pain, palpitations and leg swelling.   Gastrointestinal:  Negative for abdominal pain, nausea and vomiting.   Genitourinary:  Negative for hematuria.   Musculoskeletal:  Positive for arthralgias and myalgias. Negative for back pain, neck pain and neck stiffness.   Skin:  Negative for wound.   Neurological:  Positive for weakness and headaches. Negative for tremors, seizures, syncope, facial asymmetry, speech difficulty, light-headedness and numbness.   Hematological:  Negative for adenopathy.   Psychiatric/Behavioral:  Negative for behavioral problems, confusion and decreased concentration.        Physical Exam     Initial Vitals [10/10/23 1137]   BP Pulse Resp Temp SpO2   (!) 151/86 88 16 98 °F (36.7 °C) 100 %      MAP       --         Physical Exam    Nursing note and vitals reviewed.  Constitutional: She appears well-developed and well-nourished.   HENT:   Head: Normocephalic and atraumatic.   Right Ear: External ear normal.   Left Ear: External ear normal.   Nose: Nose normal.   Mouth/Throat: Oropharynx is clear and moist.   Eyes: Pupils are equal, round, and reactive to light. Right eye exhibits no discharge. Left eye exhibits no discharge. No scleral icterus.   Neck: Neck supple.   Normal range of motion.  Cardiovascular:  Normal rate, regular rhythm and normal heart sounds.     Exam reveals no gallop.       No murmur heard.  Abdominal: Bowel sounds are normal. She exhibits no distension. There is no abdominal tenderness.   Musculoskeletal:         General: Normal range of motion.      Cervical back: Normal range of motion and neck supple.     Lymphadenopathy:     She has no cervical  adenopathy.   Neurological: She is alert and oriented to person, place, and time. She displays normal reflexes. A sensory deficit is present. No cranial nerve deficit.   Weakness in the left arm and foot 4/5  Decreased sensation in the left side   Skin: Skin is warm. No erythema.   Psychiatric: She has a normal mood and affect. Thought content normal.         Medical Screening Exam   See Full Note    ED Course   Procedures  Labs Reviewed   COMPREHENSIVE METABOLIC PANEL - Abnormal; Notable for the following components:       Result Value    Chloride 108 (*)     Alk Phos 100 (*)     All other components within normal limits   CBC WITH DIFFERENTIAL - Abnormal; Notable for the following components:    Neutrophils % 70.8 (*)     Lymphocytes % 20.3 (*)     All other components within normal limits   POCT GLUCOSE MONITORING CONTINUOUS - Abnormal; Notable for the following components:    POC Glucose 122 (*)     All other components within normal limits   PROTIME-INR - Normal   APTT - Normal   TSH - Normal   TROPONIN I - Normal   CBC W/ AUTO DIFFERENTIAL    Narrative:     The following orders were created for panel order CBC W/ AUTO DIFFERENTIAL.  Procedure                               Abnormality         Status                     ---------                               -----------         ------                     CBC with Differential[[id number]]       Abnormal            Final result                 Please view results for these tests on the individual orders.   POCT GLUCOSE MONITORING CONTINUOUS          Imaging Results              CTA Head (Final result)  Result time 10/10/23 13:00:50      Final result by Titus Ann DO (10/10/23 13:00:50)                   Impression:      No high-grade stenosis or focal occlusion of the [place].      Electronically signed by: Titus Ann  Date:    10/10/2023  Time:    13:00               Narrative:    EXAMINATION:  CTA HEAD    CLINICAL HISTORY:  Neuro deficit,  acute, stroke suspected;    TECHNIQUE:  Multiplane CTA of the [place] Cont[place].  100 cc of Isovue 370.    COMPARISON:  10/10/23    FINDINGS:  No aneurysm. The V4 segments of the bilateral vertebral arteries, posteroinferior cerebellar arteries, basilar artery, posterior cerebral and superior cerebellar arteries are patent.    The internal carotid arteries, middle cerebral and anterior cerebral arteries are patent.  The anterior communicating arteries and posterior communicating arteries are patent.                                       CTA Neck (Final result)  Result time 10/10/23 12:52:50      Final result by Titus Ann DO (10/10/23 12:52:50)                   Impression:      No high-grade stenosis or focal occlusion.      Electronically signed by: Titus Ann  Date:    10/10/2023  Time:    12:52               Narrative:    EXAMINATION:  CTA NECK    CLINICAL HISTORY:  Neuro deficit, acute, stroke suspected;    TECHNIQUE:  Multiplane CTA of the neck following administration of 100 cc of Isovue 370.    COMPARISON:  10/10/2023    FINDINGS:  The aortic arch, common carotid, internal carotid and vertebral arteries are patent.  No mucosal lesion, the parotid glands and submandibular glands are normal.  No lymphadenopathy or soft tissue mass.  The thyroid gland is normal.  The upper lobes of the lungs are clear.  Mild multilevel degenerative change.                                       CT Head Without Contrast (Final result)  Result time 10/10/23 11:57:08      Final result by Jcarlos Salas II, MD (10/10/23 11:57:08)                   Impression:      No evidence of abnormality demonstrated.      Electronically signed by: Jcarlos Salas  Date:    10/10/2023  Time:    11:57               Narrative:    EXAMINATION:  CT HEAD WITHOUT CONTRAST    CLINICAL HISTORY:  Neuro deficit, acute, stroke suspected;    TECHNIQUE:  Axial CT imaging of the brain is performed without contrast with 3 mm increments.    CT  dose reduction technique used - Dose Rite and tube current modulation.    COMPARISON:  None available    FINDINGS:  No evidence of hemorrhage, mass, mass effect, midline shift or acute infarct seen.  The brain parenchyma attenuation and differentiation appears within normal limits. The ventricles and cisterns are normal in caliber.  No cranial or skull base abnormality is identified.                                       Medications   ketorolac injection 30 mg (has no administration in time range)   iopamidoL (ISOVUE-370) injection 100 mL (100 mLs Intravenous Given 10/10/23 1247)     Medical Decision Making  Amount and/or Complexity of Data Reviewed  Labs: ordered.  Radiology: ordered.    Risk  OTC drugs.  Prescription drug management.              Attending Attestation:   Physician Attestation Statement for Resident:  As the supervising MD   Physician Attestation Statement: I have personally seen and examined this patient.   I agree with the above history.  -:   As the supervising MD I agree with the above PE.     As the supervising MD I agree with the above treatment, course, plan, and disposition.                                    Clinical Impression:   Final diagnoses:  [I63.9] Stroke  [M79.602] Pain of left upper extremity (Primary)  [R47.01] Expressive aphasia        ED Disposition Condition    Discharge Stable          ED Prescriptions       Medication Sig Dispense Start Date End Date Auth. Provider    aspirin (ECOTRIN) 81 MG EC tablet Take 1 tablet (81 mg total) by mouth once daily. 60 tablet 10/10/2023 12/9/2023 Anuj Askew MD    naproxen (NAPROSYN) 375 MG tablet Take 1 tablet (375 mg total) by mouth 2 (two) times daily with meals. 20 tablet 10/10/2023 -- Anuj Askew MD    pantoprazole (PROTONIX) 20 MG tablet Take 1 tablet (20 mg total) by mouth once daily. 28 tablet 10/10/2023 11/7/2023 Anuj Askew MD          Follow-up Information    None          David Rubalcava, DO  10/10/23 1234       Azar  MD Anuj  Resident  10/10/23 1344       Anuj Askew MD  Resident  10/10/23 1418

## 2023-10-10 NOTE — Clinical Note
"Haleigh "Reji Newman was seen and treated in our emergency department on 10/10/2023.  She may return to work on 10/17/2023.       If you have any questions or concerns, please don't hesitate to call.      David Rubalcava, DO"

## 2023-10-10 NOTE — ASSESSMENT & PLAN NOTE
44 yo female w/ PMHx of Depression, HTN who presents w/ reported slurred speech and pain to the LUE.  LKW 10 AM while watching TV  Sunday patient went to the ER due to pain in her LUE - was diagnosed w/ muscle spasms.   Patient clarifies, symptoms actually started Sunday, but progressively got worse.   Denies previous episodes such as this prior to Sunday.     NIHSS 1. OOW for TNK based on timeline of symptom onset + clinical features suggesting stroke mimic ( non-physiologic speech patter, pain to the LUE rather than weakness)    Imaging reviewed w/ no acute intracranial abnormalities.      Further care as per ED, recommendations discussed w/ Dr. Rubalcava.

## 2023-10-10 NOTE — HPI
44 yo female w/ PMHx of Depression, HTN who presents w/ reported slurred speech and pain to the LUE.  LKW 10 AM while watching TV  Sunday patient went to the ER due to pain in her LUE - was diagnosed w/ muscle spasms.   Patient clarifies, symptoms actually started Sunday, but progressively got worse.   Denies previous episodes such as this prior to Sunday.

## 2023-10-12 ENCOUNTER — OFFICE VISIT (OUTPATIENT)
Dept: PRIMARY CARE CLINIC | Facility: CLINIC | Age: 43
End: 2023-10-12
Payer: COMMERCIAL

## 2023-10-12 VITALS
OXYGEN SATURATION: 98 % | SYSTOLIC BLOOD PRESSURE: 117 MMHG | WEIGHT: 180 LBS | HEIGHT: 64 IN | HEART RATE: 80 BPM | RESPIRATION RATE: 18 BRPM | DIASTOLIC BLOOD PRESSURE: 55 MMHG | BODY MASS INDEX: 30.73 KG/M2 | TEMPERATURE: 99 F

## 2023-10-12 DIAGNOSIS — Z77.29 CARBON MONOXIDE EXPOSURE: Primary | ICD-10-CM

## 2023-10-12 LAB — COHGB MFR BLDV: 4.5 % (ref 0–3)

## 2023-10-12 PROCEDURE — 82375 ASSAY CARBOXYHB QUANT: CPT | Performed by: NURSE PRACTITIONER

## 2023-10-12 PROCEDURE — 36415 COLL VENOUS BLD VENIPUNCTURE: CPT | Mod: ,,, | Performed by: NURSE PRACTITIONER

## 2023-10-12 PROCEDURE — 36415 PR COLLECTION VENOUS BLOOD,VENIPUNCTURE: ICD-10-PCS | Mod: ,,, | Performed by: NURSE PRACTITIONER

## 2023-10-12 NOTE — PROGRESS NOTES
Subjective     Patient ID: Haleigh Newman is a 43 y.o. female.    Chief Complaint: lab (Presents today for lab)    44 y/o bf presents today requesting a carbon dioxide lab test. She states that it was elevated in the past. She states that she smells gas when driving her car and the car repair shop has done several mechanical things to decrease the smell of gasoline. Pt is only here for lab work.       Review of Systems       Objective     Physical Exam       Assessment and Plan     1. Carbon monoxide exposure  -     Carboxyhemoglobin        Lab results called and discussed with the patient and her mother. I advised her that smoking can also raise the carboxyhemoglobin level as well. She understands.          No follow-ups on file.

## 2023-11-01 ENCOUNTER — HOSPITAL ENCOUNTER (EMERGENCY)
Facility: HOSPITAL | Age: 43
Discharge: HOME | End: 2023-11-01
Attending: FAMILY MEDICINE
Payer: COMMERCIAL

## 2023-11-01 VITALS
TEMPERATURE: 98 F | WEIGHT: 180 LBS | DIASTOLIC BLOOD PRESSURE: 75 MMHG | SYSTOLIC BLOOD PRESSURE: 130 MMHG | BODY MASS INDEX: 30.73 KG/M2 | RESPIRATION RATE: 18 BRPM | HEART RATE: 81 BPM | OXYGEN SATURATION: 98 % | HEIGHT: 64 IN

## 2023-11-01 DIAGNOSIS — M54.10 RADICULOPATHY, UNSPECIFIED SPINAL REGION: Primary | ICD-10-CM

## 2023-11-01 DIAGNOSIS — F41.9 ANXIETY: ICD-10-CM

## 2023-11-01 DIAGNOSIS — R53.1 WEAKNESS: ICD-10-CM

## 2023-11-01 LAB — GLUCOSE SERPL-MCNC: 121 MG/DL (ref 70–105)

## 2023-11-01 PROCEDURE — 93010 ELECTROCARDIOGRAM REPORT: CPT | Mod: ,,, | Performed by: INTERNAL MEDICINE

## 2023-11-01 PROCEDURE — 93005 ELECTROCARDIOGRAM TRACING: CPT

## 2023-11-01 PROCEDURE — 99284 PR EMERGENCY DEPT VISIT,LEVEL IV: ICD-10-PCS | Mod: ,,, | Performed by: FAMILY MEDICINE

## 2023-11-01 PROCEDURE — 82962 GLUCOSE BLOOD TEST: CPT

## 2023-11-01 PROCEDURE — 63600175 PHARM REV CODE 636 W HCPCS: Performed by: FAMILY MEDICINE

## 2023-11-01 PROCEDURE — 99284 EMERGENCY DEPT VISIT MOD MDM: CPT | Mod: 25

## 2023-11-01 PROCEDURE — 99284 EMERGENCY DEPT VISIT MOD MDM: CPT | Mod: ,,, | Performed by: FAMILY MEDICINE

## 2023-11-01 PROCEDURE — 93010 EKG 12-LEAD: ICD-10-PCS | Mod: ,,, | Performed by: INTERNAL MEDICINE

## 2023-11-01 PROCEDURE — 96374 THER/PROPH/DIAG INJ IV PUSH: CPT

## 2023-11-01 RX ORDER — LORAZEPAM 2 MG/ML
2 INJECTION INTRAMUSCULAR ONCE
Status: COMPLETED | OUTPATIENT
Start: 2023-11-01 | End: 2023-11-01

## 2023-11-01 RX ADMIN — LORAZEPAM 2 MG: 2 INJECTION INTRAMUSCULAR; INTRAVENOUS at 03:11

## 2023-11-01 NOTE — ED NOTES
"Pt states that she has no pain at this time.  Assisted pt to walk to the bathroom, able to move all extremities without any problems.  Pt is slightly off balance from the medication she was given.  States "that was some good medicine and I need some of that to take home with me".  MD made aware of pt able to move all extremities and states no pain at this time  "

## 2023-11-01 NOTE — ED PROVIDER NOTES
Encounter Date: 2023       History     Chief Complaint   Patient presents with    Leg Pain    Arm Pain     Patient comes in with stuttered slurred speech which she is had for the last month or so.  She states pain radiates down her left side of her neck into her arm.  She sees pain treatment for this she is been to [facility] in the [facility] 2 weeks it has MRI of her neck MRI of her head and MRI of her thoracic musculoskeletal zone.  The results especially been talked with her on the  of this month.  Patient states that when she has this kind of pain she starts daughter and having increased problems.        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 car    Depression     Essential (primary) hypertension     Nicotine dependence      Past Surgical History:   Procedure Laterality Date     SECTION       Family History   Problem Relation Age of Onset    Hypertension Mother     Thyroid disease Mother     Hypertension Father     Thyroid disease Sister     Cancer Son     Thyroid disease Maternal Aunt     Cancer Paternal Aunt     Hypertension Maternal Grandmother     Cancer Maternal Grandmother     Diabetes Maternal Grandmother     Hypertension Maternal Grandfather     Diabetes Maternal Grandfather     Hypertension Paternal Grandmother     Cancer Paternal Grandmother     Diabetes Paternal Grandmother     Hypertension Paternal Grandfather     Diabetes Paternal Grandfather      Social History     Tobacco Use    Smoking status: Some Days     Current packs/day: 0.25     Average packs/day: 0.3 packs/day for 16.0 years (4.0 ttl pk-yrs)     Types: Cigarettes    Smokeless tobacco: Never    Tobacco comments:     Has taken chantix in the past.    Substance Use Topics    Alcohol use: Never    Drug use: Never     Review of Systems   Constitutional: Negative.  Negative for fever.   HENT:  Negative.  Negative for sore throat.    Eyes: Negative.    Respiratory: Negative.  Negative for shortness of breath.    Cardiovascular: Negative.  Negative for chest pain.   Gastrointestinal: Negative.  Negative for nausea.   Endocrine: Negative.    Genitourinary: Negative.  Negative for dysuria.   Musculoskeletal:  Positive for neck pain. Negative for back pain.        Left neck pain with radiculopathy   Skin: Negative.  Negative for rash.   Allergic/Immunologic: Negative.    Neurological: Negative.  Negative for weakness.   Hematological: Negative.  Does not bruise/bleed easily.   Psychiatric/Behavioral: Negative.     All other systems reviewed and are negative.      Physical Exam     Initial Vitals   BP Pulse Resp Temp SpO2   11/01/23 0257 11/01/23 0257 11/01/23 0257 11/01/23 0323 11/01/23 0257   139/76 86 19 97.8 °F (36.6 °C) 100 %      MAP       --                Physical Exam    Constitutional: She appears well-developed and well-nourished.   HENT:   Head: Normocephalic and atraumatic.   Right Ear: External ear normal.   Left Ear: External ear normal.   Nose: Nose normal.   Mouth/Throat: Oropharynx is clear and moist.   Eyes: Conjunctivae and EOM are normal. Pupils are equal, round, and reactive to light.   Neck: Neck supple.   Normal range of motion.  Cardiovascular:  Normal rate, regular rhythm, normal heart sounds and intact distal pulses.           Pulmonary/Chest: Breath sounds normal.   Abdominal: Abdomen is soft. Bowel sounds are normal.   Genitourinary:    Vagina and uterus normal.     Musculoskeletal:         General: Normal range of motion.      Cervical back: Normal range of motion and neck supple.     Neurological: She is alert and oriented to person, place, and time. She has normal strength and normal reflexes.   Left neck pain with radiculopathy   Skin: Skin is warm. Capillary refill takes less than 2 seconds.   Psychiatric: She has a normal mood and affect. Her behavior is normal. Judgment and  thought content normal.         Medical Screening Exam   See Full Note    ED Course   Procedures  Labs Reviewed   POCT GLUCOSE MONITORING CONTINUOUS - Abnormal; Notable for the following components:       Result Value    POC Glucose 121 (*)     All other components within normal limits          Imaging Results    None          Medications   LORazepam injection 2 mg (2 mg Intravenous Given 11/1/23 0351)     Medical Decision Making  Risk  Prescription drug management.                          Medical Decision Making:   Initial Assessment:   Patient comes in with pain in the left neck radiating down left arm  Differential Diagnosis:   Anxiety secondary to cervical radiculopathy and pain      Clinical Impression:   Final diagnoses:  [M54.10] Radiculopathy, unspecified spinal region (Primary)  [F41.9] Anxiety        ED Disposition Condition    Discharge Stable          ED Prescriptions    None       Follow-up Information    None          David Rubalcava, DO  11/01/23 2894

## 2023-11-01 NOTE — ED TRIAGE NOTES
"EMS from home reports recent stroke on 10/4. Family reports more slurred speech. EMS states more of delayed speech than slurred speech and no deficits. Patient c/o left leg pain, arm pain, and shoulder pain. Seen for same complaint on 10/8/23. Patient states tonight she was at work and was able to move all extremities when she had a sharp pain "shoot down the whole left side of body" and now having trouble moving left arm and left leg.   "

## 2024-01-09 ENCOUNTER — OFFICE VISIT (OUTPATIENT)
Dept: PRIMARY CARE CLINIC | Facility: CLINIC | Age: 44
End: 2024-01-09
Payer: COMMERCIAL

## 2024-01-09 VITALS
HEART RATE: 78 BPM | DIASTOLIC BLOOD PRESSURE: 80 MMHG | BODY MASS INDEX: 31.07 KG/M2 | OXYGEN SATURATION: 98 % | HEIGHT: 64 IN | SYSTOLIC BLOOD PRESSURE: 122 MMHG | RESPIRATION RATE: 18 BRPM | WEIGHT: 182 LBS

## 2024-01-09 DIAGNOSIS — I10 ESSENTIAL (PRIMARY) HYPERTENSION: Primary | ICD-10-CM

## 2024-01-09 DIAGNOSIS — F17.210 CIGARETTE NICOTINE DEPENDENCE WITHOUT COMPLICATION: ICD-10-CM

## 2024-01-09 DIAGNOSIS — G43.809 OTHER MIGRAINE WITHOUT STATUS MIGRAINOSUS, NOT INTRACTABLE: ICD-10-CM

## 2024-01-09 PROBLEM — F17.200 NICOTINE DEPENDENCE: Status: ACTIVE | Noted: 2024-01-09

## 2024-01-09 PROBLEM — G43.909 MIGRAINE WITHOUT STATUS MIGRAINOSUS, NOT INTRACTABLE: Status: ACTIVE | Noted: 2024-01-09

## 2024-01-09 PROCEDURE — 3008F BODY MASS INDEX DOCD: CPT | Mod: ,,, | Performed by: FAMILY MEDICINE

## 2024-01-09 PROCEDURE — 99213 OFFICE O/P EST LOW 20 MIN: CPT | Mod: ,,, | Performed by: FAMILY MEDICINE

## 2024-01-09 PROCEDURE — 3079F DIAST BP 80-89 MM HG: CPT | Mod: ,,, | Performed by: FAMILY MEDICINE

## 2024-01-09 PROCEDURE — 1159F MED LIST DOCD IN RCRD: CPT | Mod: ,,, | Performed by: FAMILY MEDICINE

## 2024-01-09 PROCEDURE — 3074F SYST BP LT 130 MM HG: CPT | Mod: ,,, | Performed by: FAMILY MEDICINE

## 2024-01-09 RX ORDER — PROPRANOLOL HYDROCHLORIDE 60 MG/1
60 CAPSULE, EXTENDED RELEASE ORAL DAILY
Qty: 30 CAPSULE | Refills: 11 | Status: SHIPPED | OUTPATIENT
Start: 2024-01-09 | End: 2024-02-07 | Stop reason: SDUPTHER

## 2024-01-09 RX ORDER — SUMATRIPTAN 50 MG/1
50 TABLET, FILM COATED ORAL
COMMUNITY
End: 2024-02-16

## 2024-01-09 RX ORDER — GABAPENTIN 100 MG/1
100 CAPSULE ORAL 2 TIMES DAILY
COMMUNITY
Start: 2023-11-01

## 2024-01-09 NOTE — PROGRESS NOTES
Subjective:      Patient ID: Haleigh Newman is a 43 y.o. female.    Chief Complaint: Establish Care and Headache    Haleigh Newman a 43 y.o. female presents for follow up on all regular problems which are reviewed and discussed.   -mri sophie's. Here with mom  Problem List Items Addressed This Visit          Neuro    Migraine without status migrainosus, not intractable       Cardiac/Vascular    Essential (primary) hypertension - Primary       Other    Nicotine dependence       Past Medical History:  Past Medical History:   Diagnosis Date    Arthritis     Carboxyhemoglobinemia     says she smells it when she drives her car    Depression     Essential (primary) hypertension     Nicotine dependence      Past Surgical History:   Procedure Laterality Date     SECTION       Review of patient's allergies indicates:  No Known Allergies  Current Outpatient Medications on File Prior to Visit   Medication Sig Dispense Refill    EScitalopram oxalate (LEXAPRO) 10 MG tablet Take 10 mg by mouth.      gabapentin (NEURONTIN) 100 MG capsule Take 100 mg by mouth 2 (two) times daily.      sumatriptan (IMITREX) 50 MG tablet Take 50 mg by mouth. 1 tab for headaches may repeat in 2 hours if needed, no more than 2 tabs in 24hrs.      tiZANidine (ZANAFLEX) 4 MG tablet Take 4 mg by mouth every evening.      [DISCONTINUED] hydroCHLOROthiazide (HYDRODIURIL) 12.5 MG Tab Take 12.5 mg by mouth.      [DISCONTINUED] naproxen (NAPROSYN) 375 MG tablet Take 1 tablet (375 mg total) by mouth 2 (two) times daily with meals. 20 tablet 0    [DISCONTINUED] amoxicillin-clavulanate 500-125mg (AUGMENTIN) 500-125 mg Tab Take 1 tablet (500 mg total) by mouth 2 (two) times daily. 28 tablet 0    [DISCONTINUED] aspirin (ECOTRIN) 81 MG EC tablet Take 1 tablet (81 mg total) by mouth once daily. 60 tablet 0    [DISCONTINUED] hydroCHLOROthiazide (MICROZIDE) 12.5 mg capsule Take 12.5 mg by mouth once daily.      [DISCONTINUED] ipratropium (ATROVENT) 21 mcg (0.03  %) nasal spray 2 sprays by Each Nostril route 2 (two) times daily. 30 mL 0    [DISCONTINUED] pantoprazole (PROTONIX) 20 MG tablet Take 1 tablet (20 mg total) by mouth once daily. 28 tablet 0    [DISCONTINUED] traZODone (DESYREL) 50 MG tablet Take 50 mg by mouth every evening.       No current facility-administered medications on file prior to visit.     Social History     Socioeconomic History    Marital status:    Tobacco Use    Smoking status: Some Days     Current packs/day: 0.25     Average packs/day: 0.3 packs/day for 16.0 years (4.0 ttl pk-yrs)     Types: Cigarettes    Smokeless tobacco: Never    Tobacco comments:     Has taken chantix in the past.    Substance and Sexual Activity    Alcohol use: Never    Drug use: Never    Sexual activity: Yes     Family History   Problem Relation Age of Onset    Hypertension Mother     Thyroid disease Mother     Hypertension Father     Thyroid disease Sister     Cancer Son     Thyroid disease Maternal Aunt     Cancer Paternal Aunt     Hypertension Maternal Grandmother     Cancer Maternal Grandmother     Diabetes Maternal Grandmother     Hypertension Maternal Grandfather     Diabetes Maternal Grandfather     Hypertension Paternal Grandmother     Cancer Paternal Grandmother     Diabetes Paternal Grandmother     Hypertension Paternal Grandfather     Diabetes Paternal Grandfather        Review of Systems   Constitutional: Negative.  Negative for activity change, appetite change, chills and diaphoresis.   HENT: Negative.  Negative for congestion, ear pain, hearing loss and postnasal drip.    Eyes:  Negative for itching.   Respiratory:  Negative for chest tightness and shortness of breath.    Cardiovascular:  Negative for chest pain.   Gastrointestinal:  Negative for abdominal pain.   Endocrine: Negative for polydipsia.   Genitourinary:  Negative for frequency.   Musculoskeletal:  Negative for back pain.   Neurological:  Positive for numbness. Negative for headaches.  "      Objective:     /80 (BP Location: Left arm, Patient Position: Sitting, BP Method: Large (Manual))   Pulse 78   Resp 18   Ht 5' 4" (1.626 m)   Wt 82.6 kg (182 lb)   SpO2 98%   BMI 31.24 kg/m²     Physical Exam  Constitutional:       Appearance: Normal appearance. She is obese.   HENT:      Head: Normocephalic and atraumatic.      Right Ear: External ear normal.      Left Ear: External ear normal.      Nose: Nose normal.      Mouth/Throat:      Mouth: Mucous membranes are moist.      Pharynx: Oropharynx is clear.   Eyes:      Pupils: Pupils are equal, round, and reactive to light.   Cardiovascular:      Rate and Rhythm: Normal rate and regular rhythm.      Heart sounds: Normal heart sounds.   Pulmonary:      Effort: Pulmonary effort is normal.      Breath sounds: Normal breath sounds.   Abdominal:      Palpations: Abdomen is soft.   Musculoskeletal:         General: No swelling or deformity.      Cervical back: Normal range of motion and neck supple.   Skin:     General: Skin is warm and dry.      Coloration: Skin is not jaundiced.      Findings: No bruising.   Neurological:      General: No focal deficit present.      Mental Status: She is alert and oriented to person, place, and time. Mental status is at baseline.      Cranial Nerves: No cranial nerve deficit.      Sensory: No sensory deficit.      Motor: No weakness.      Coordination: Coordination normal.      Gait: Gait normal.      Deep Tendon Reflexes: Reflexes normal.   Psychiatric:         Mood and Affect: Mood normal.         Behavior: Behavior normal.         Thought Content: Thought content normal.         Judgment: Judgment normal.         1. Essential (primary) hypertension    2. Other migraine without status migrainosus, not intractable    3. Cigarette nicotine dependence without complication        Plan:     Problem List Items Addressed This Visit          Neuro    Migraine without status migrainosus, not intractable       " Cardiac/Vascular    Essential (primary) hypertension - Primary       Other    Nicotine dependence     No follow-ups on file.  Dc hctz  Education  Dc nsaids  Inderal la  Change neurontin to qhs  Zanaflex to prn  1m fu headache diary    I have discontinued Haleigh Newman's hydroCHLOROthiazide and naproxen. I am also having her start on propranoloL. Additionally, I am having her maintain her EScitalopram oxalate, tiZANidine, gabapentin, and sumatriptan.    Haleigh was seen today for establish care and headache.    Diagnoses and all orders for this visit:    Essential (primary) hypertension    Other migraine without status migrainosus, not intractable    Cigarette nicotine dependence without complication    Other orders  -     propranoloL (INDERAL LA) 60 MG 24 hr capsule; Take 1 capsule (60 mg total) by mouth once daily.      Medications Ordered This Encounter   Medications    propranoloL (INDERAL LA) 60 MG 24 hr capsule     Sig: Take 1 capsule (60 mg total) by mouth once daily.     Dispense:  30 capsule     Refill:  11     .     [unfilled]  No orders of the defined types were placed in this encounter.

## 2024-02-07 ENCOUNTER — OFFICE VISIT (OUTPATIENT)
Dept: PRIMARY CARE CLINIC | Facility: CLINIC | Age: 44
End: 2024-02-07
Payer: COMMERCIAL

## 2024-02-07 VITALS
HEART RATE: 74 BPM | HEIGHT: 64 IN | OXYGEN SATURATION: 99 % | RESPIRATION RATE: 18 BRPM | SYSTOLIC BLOOD PRESSURE: 144 MMHG | BODY MASS INDEX: 31.92 KG/M2 | DIASTOLIC BLOOD PRESSURE: 90 MMHG | WEIGHT: 187 LBS

## 2024-02-07 DIAGNOSIS — G43.109 MIGRAINE WITH AURA AND WITHOUT STATUS MIGRAINOSUS, NOT INTRACTABLE: ICD-10-CM

## 2024-02-07 DIAGNOSIS — R51.9 FREQUENT HEADACHES: Primary | ICD-10-CM

## 2024-02-07 DIAGNOSIS — R47.81 SLURRED SPEECH: ICD-10-CM

## 2024-02-07 DIAGNOSIS — I10 ESSENTIAL (PRIMARY) HYPERTENSION: ICD-10-CM

## 2024-02-07 DIAGNOSIS — M25.569 KNEE PAIN, UNSPECIFIED CHRONICITY, UNSPECIFIED LATERALITY: Primary | ICD-10-CM

## 2024-02-07 DIAGNOSIS — R47.01 APHASIA: ICD-10-CM

## 2024-02-07 DIAGNOSIS — F17.210 CIGARETTE NICOTINE DEPENDENCE WITHOUT COMPLICATION: ICD-10-CM

## 2024-02-07 DIAGNOSIS — R52 PAIN: ICD-10-CM

## 2024-02-07 DIAGNOSIS — G56.92 MONONEUROPATHY OF LEFT UPPER EXTREMITY: ICD-10-CM

## 2024-02-07 PROCEDURE — 3008F BODY MASS INDEX DOCD: CPT | Mod: ,,, | Performed by: FAMILY MEDICINE

## 2024-02-07 PROCEDURE — 3080F DIAST BP >= 90 MM HG: CPT | Mod: ,,, | Performed by: FAMILY MEDICINE

## 2024-02-07 PROCEDURE — 3077F SYST BP >= 140 MM HG: CPT | Mod: ,,, | Performed by: FAMILY MEDICINE

## 2024-02-07 PROCEDURE — 1159F MED LIST DOCD IN RCRD: CPT | Mod: ,,, | Performed by: FAMILY MEDICINE

## 2024-02-07 PROCEDURE — 99214 OFFICE O/P EST MOD 30 MIN: CPT | Mod: ,,, | Performed by: FAMILY MEDICINE

## 2024-02-07 RX ORDER — CELECOXIB 200 MG/1
200 CAPSULE ORAL 2 TIMES DAILY
Qty: 30 CAPSULE | Refills: 5 | Status: SHIPPED | OUTPATIENT
Start: 2024-02-07

## 2024-02-07 RX ORDER — ESCITALOPRAM OXALATE 10 MG/1
10 TABLET ORAL DAILY
Qty: 90 TABLET | Refills: 3 | Status: SHIPPED | OUTPATIENT
Start: 2024-02-07

## 2024-02-07 RX ORDER — ESCITALOPRAM OXALATE 10 MG/1
10 TABLET ORAL DAILY
Qty: 90 TABLET | Refills: 3 | Status: SHIPPED | OUTPATIENT
Start: 2024-02-07 | End: 2024-02-07 | Stop reason: SDUPTHER

## 2024-02-07 RX ORDER — PROPRANOLOL HYDROCHLORIDE 120 MG/1
120 CAPSULE, EXTENDED RELEASE ORAL DAILY
Qty: 30 CAPSULE | Refills: 11 | Status: SHIPPED | OUTPATIENT
Start: 2024-02-07 | End: 2025-02-06

## 2024-02-07 NOTE — PROGRESS NOTES
Subjective:      Patient ID: Haleigh Newman is a 43 y.o. female.    Chief Complaint: Follow-up (1mon. Ck-up) and Headache    Haleigh Newman a 43 y.o. female presents for follow up on all regular problems which are reviewed and discussed.   Knee pain  Problem List Items Addressed This Visit          Neuro    Migraine without status migrainosus, not intractable       Cardiac/Vascular    Essential (primary) hypertension       Orthopedic    Knee pain - Primary       Other    Nicotine dependence    Pain    Relevant Orders    X-Ray Knee 1 or 2 View Right       Past Medical History:  Past Medical History:   Diagnosis Date    Arthritis     Carboxyhemoglobinemia     says she smells it when she drives her car    Depression     Essential (primary) hypertension     Nicotine dependence      Past Surgical History:   Procedure Laterality Date     SECTION       Review of patient's allergies indicates:  No Known Allergies  Current Outpatient Medications on File Prior to Visit   Medication Sig Dispense Refill    gabapentin (NEURONTIN) 100 MG capsule Take 100 mg by mouth 2 (two) times daily.      sumatriptan (IMITREX) 50 MG tablet Take 50 mg by mouth. 1 tab for headaches may repeat in 2 hours if needed, no more than 2 tabs in 24hrs.      tiZANidine (ZANAFLEX) 4 MG tablet Take 4 mg by mouth every evening.       No current facility-administered medications on file prior to visit.     Social History     Socioeconomic History    Marital status:    Tobacco Use    Smoking status: Some Days     Current packs/day: 0.25     Average packs/day: 0.3 packs/day for 16.0 years (4.0 ttl pk-yrs)     Types: Cigarettes    Smokeless tobacco: Never    Tobacco comments:     Has taken chantix in the past.    Substance and Sexual Activity    Alcohol use: Never    Drug use: Never    Sexual activity: Yes     Family History   Problem Relation Age of Onset    Hypertension Mother     Thyroid disease Mother     Hypertension Father     Thyroid disease  "Sister     Cancer Son     Thyroid disease Maternal Aunt     Cancer Paternal Aunt     Hypertension Maternal Grandmother     Cancer Maternal Grandmother     Diabetes Maternal Grandmother     Hypertension Maternal Grandfather     Diabetes Maternal Grandfather     Hypertension Paternal Grandmother     Cancer Paternal Grandmother     Diabetes Paternal Grandmother     Hypertension Paternal Grandfather     Diabetes Paternal Grandfather        Review of Systems   Constitutional: Negative.  Negative for activity change, appetite change, chills and diaphoresis.   HENT: Negative.  Negative for congestion, ear pain, hearing loss and postnasal drip.    Eyes:  Negative for itching.   Respiratory:  Negative for chest tightness and shortness of breath.    Cardiovascular:  Negative for chest pain.   Gastrointestinal:  Negative for abdominal pain.   Endocrine: Negative for polydipsia.   Genitourinary:  Negative for frequency.   Musculoskeletal:  Negative for back pain.   Neurological:  Negative for headaches.       Objective:     BP (!) 144/90 (BP Location: Right arm)   Pulse 74   Resp 18   Ht 5' 4" (1.626 m)   Wt 84.8 kg (187 lb)   SpO2 99%   BMI 32.10 kg/m²     Physical Exam  Constitutional:       Appearance: Normal appearance. She is obese.   HENT:      Head: Normocephalic and atraumatic.      Right Ear: External ear normal.      Left Ear: External ear normal.      Nose: Nose normal.      Mouth/Throat:      Mouth: Mucous membranes are moist.      Pharynx: Oropharynx is clear.   Eyes:      Pupils: Pupils are equal, round, and reactive to light.   Neck:      Vascular: No carotid bruit.   Cardiovascular:      Rate and Rhythm: Normal rate and regular rhythm.      Heart sounds: Normal heart sounds. No murmur heard.     No gallop.   Pulmonary:      Effort: Pulmonary effort is normal. No respiratory distress.      Breath sounds: Normal breath sounds. No wheezing or rales.   Abdominal:      Palpations: Abdomen is soft. "   Musculoskeletal:      Cervical back: Normal range of motion and neck supple.   Skin:     General: Skin is warm and dry.   Neurological:      General: No focal deficit present.      Mental Status: She is alert and oriented to person, place, and time. Mental status is at baseline.   Psychiatric:         Mood and Affect: Mood normal.         Behavior: Behavior normal.         Thought Content: Thought content normal.         Judgment: Judgment normal.         1. Knee pain, unspecified chronicity, unspecified laterality    2. Pain    3. Migraine with aura and without status migrainosus, not intractable    4. Essential (primary) hypertension    5. Cigarette nicotine dependence without complication        Plan:     Problem List Items Addressed This Visit          Neuro    Migraine without status migrainosus, not intractable       Cardiac/Vascular    Essential (primary) hypertension       Orthopedic    Knee pain - Primary       Other    Nicotine dependence    Pain    Relevant Orders    X-Ray Knee 1 or 2 View Right     No follow-ups on file.  Celebrex for knee  Inc inderal for bp/headaches  8 headache days last month--imitrex helped  Neuro apt.  Xray knee--the right knee that I ordered xray of--so I could be almost as redundant as possible  6wk fu  I have changed Haleigh Newman's propranoloL. I am also having her start on celecoxib. Additionally, I am having her maintain her tiZANidine, gabapentin, sumatriptan, and EScitalopram oxalate.    Haleigh was seen today for follow-up and headache.    Diagnoses and all orders for this visit:    Knee pain, unspecified chronicity, unspecified laterality    Pain  -     X-Ray Knee 1 or 2 View Right; Future    Migraine with aura and without status migrainosus, not intractable    Essential (primary) hypertension    Cigarette nicotine dependence without complication    Other orders  -     Discontinue: EScitalopram oxalate (LEXAPRO) 10 MG tablet; Take 1 tablet (10 mg total) by mouth once  daily.  -     propranoloL (INDERAL LA) 120 MG 24 hr capsule; Take 1 capsule (120 mg total) by mouth once daily.  -     celecoxib (CELEBREX) 200 MG capsule; Take 1 capsule (200 mg total) by mouth 2 (two) times daily.  -     EScitalopram oxalate (LEXAPRO) 10 MG tablet; Take 1 tablet (10 mg total) by mouth once daily.      Medications Ordered This Encounter   Medications    celecoxib (CELEBREX) 200 MG capsule     Sig: Take 1 capsule (200 mg total) by mouth 2 (two) times daily.     Dispense:  30 capsule     Refill:  5    EScitalopram oxalate (LEXAPRO) 10 MG tablet     Sig: Take 1 tablet (10 mg total) by mouth once daily.     Dispense:  90 tablet     Refill:  3    propranoloL (INDERAL LA) 120 MG 24 hr capsule     Sig: Take 1 capsule (120 mg total) by mouth once daily.     Dispense:  30 capsule     Refill:  11     .     [unfilled]  Orders Placed This Encounter   Procedures    X-Ray Knee 1 or 2 View Right     Standing Status:   Future     Standing Expiration Date:   2/7/2025     Order Specific Question:   May the Radiologist modify the order per protocol to meet the clinical needs of the patient?     Answer:   Yes     Order Specific Question:   Release to patient     Answer:   Immediate

## 2024-02-14 PROBLEM — M25.569 KNEE PAIN: Status: ACTIVE | Noted: 2024-02-14

## 2024-02-15 NOTE — PROGRESS NOTES
Subjective:       Patient ID: Haleigh Newman is a 43 y.o. female     Chief Complaint:    Chief Complaint   Patient presents with    Headache     New Patient.        Allergies:  Patient has no known allergies.    Current Medications:    Outpatient Encounter Medications as of 2/16/2024   Medication Sig Dispense Refill    celecoxib (CELEBREX) 200 MG capsule Take 1 capsule (200 mg total) by mouth 2 (two) times daily. 30 capsule 5    EScitalopram oxalate (LEXAPRO) 10 MG tablet Take 1 tablet (10 mg total) by mouth once daily. 90 tablet 3    gabapentin (NEURONTIN) 100 MG capsule Take 100 mg by mouth 2 (two) times daily.      propranoloL (INDERAL LA) 120 MG 24 hr capsule Take 1 capsule (120 mg total) by mouth once daily. 30 capsule 11    tiZANidine (ZANAFLEX) 4 MG tablet Take 4 mg by mouth every evening.      [DISCONTINUED] sumatriptan (IMITREX) 50 MG tablet Take 50 mg by mouth. 1 tab for headaches may repeat in 2 hours if needed, no more than 2 tabs in 24hrs.      sumatriptan (IMITREX) 100 MG tablet Take 1 tablet at onset of headache.  May repeat in 2 hours.  No more than 2 tabs per 24 hours.  No more than 3 days of the week 10 tablet 2    [DISCONTINUED] EScitalopram oxalate (LEXAPRO) 10 MG tablet Take 10 mg by mouth.      [DISCONTINUED] propranoloL (INDERAL LA) 60 MG 24 hr capsule Take 1 capsule (60 mg total) by mouth once daily. 30 capsule 11     No facility-administered encounter medications on file as of 2/16/2024.       History of Present Illness  44 y/o female new referral to neurology for reported migraine headaches.    Referral indicated she had prior MRI brain and it was noted done in October of 2023, negative    Onset of headaches in October by her report, is on propranolol 120mg daily, she reports on average with the propranolol about 4 days per week, where it was daily before.  She is however reporting a very frequent use of PRN meds, including the imitrex at near daily use which I encouraged her to avoid its  use and any PRN medication for migraine to no more than 3 days of the week.    She has the imitrex at 50mg dosing, will increase to 100mg.  She is on celebrex for knee pain, I encouraged her to avoid its daily use as well.           Review of Systems  Review of Systems   Constitutional:  Negative for diaphoresis and fever.   HENT:  Negative for congestion, hearing loss and tinnitus.    Eyes:  Negative for blurred vision, double vision, photophobia, discharge and redness.   Respiratory:  Negative for cough and shortness of breath.    Cardiovascular:  Negative for chest pain.   Gastrointestinal:  Negative for abdominal pain, nausea and vomiting.   Musculoskeletal:  Negative for back pain, joint pain, myalgias and neck pain.   Skin:  Negative for itching and rash.   Neurological:  Positive for headaches. Negative for dizziness, tremors, sensory change, speech change, focal weakness, seizures, loss of consciousness and weakness.   Psychiatric/Behavioral:  Negative for depression, hallucinations and memory loss. The patient does not have insomnia.    All other systems reviewed and are negative.     Objective:     NEUROLOGICAL EXAMINATION:     MENTAL STATUS   Oriented to person, place, and time.   Attention: normal. Concentration: normal.   Speech: speech is normal   Level of consciousness: alert  Knowledge: good and consistent with education.   Normal comprehension.     CRANIAL NERVES     CN II   Visual fields full to confrontation.   Visual acuity: normal  Right visual field deficit: none  Left visual field deficit: none     CN III, IV, VI   Pupils are equal, round, and reactive to light.  Extraocular motions are normal.   Right pupil: Size: 3 mm. Shape: regular. Reactivity: brisk. Consensual response: intact. Accommodation: intact.   Left pupil: Size: 3 mm. Shape: regular. Reactivity: brisk. Consensual response: intact. Accommodation: intact.   CN III: no CN III palsy  CN VI: no CN VI palsy  Nystagmus: none   Diplopia:  none  Upgaze: normal  Downgaze: normal  Conjugate gaze: present  Vestibulo-ocular reflex: present    CN V   Facial sensation intact.   Right facial sensation deficit: none  Left facial sensation deficit: none  Right corneal reflex: normal  Left corneal reflex: normal    CN VII   Facial expression full, symmetric.   Right facial weakness: none  Left facial weakness: none  Right taste: normal  Left taste: normal    CN VIII   CN VIII normal.   Hearing: intact    CN IX, X   CN IX normal.   CN X normal.   Palate: symmetric    CN XI   CN XI normal.   Right sternocleidomastoid strength: normal  Left sternocleidomastoid strength: normal  Right trapezius strength: normal  Left trapezius strength: normal    CN XII   CN XII normal.   Tongue: not atrophic  Fasciculations: absent  Tongue deviation: none    MOTOR EXAM   Muscle bulk: normal  Overall muscle tone: normal  Right arm tone: normal  Left arm tone: normal  Right arm pronator drift: absent  Left arm pronator drift: absent  Right leg tone: normal  Left leg tone: normal    Strength   Right neck flexion: 5/5  Left neck flexion: 5/5  Right neck extension: 5/5  Left neck extension: 5/5  Right deltoid: 5/5  Left deltoid: 5/5  Right biceps: 5/5  Left biceps: 5/5  Right triceps: 5/5  Left triceps: 5/5  Right wrist flexion: 5/5  Left wrist flexion: 5/5  Right wrist extension: 5/5  Left wrist extension: 5/5  Right interossei: 5/5  Left interossei: 5/5  Right iliopsoas: 5/5  Left iliopsoas: 5/5  Right quadriceps: 5/5  Left quadriceps: 5/5  Right hamstrin/5  Left hamstrin/5  Right anterior tibial: 5/5  Left anterior tibial: 5/5  Right posterior tibial: 5/5  Left posterior tibial: 5/5  Right gastroc: 5/5  Left gastroc: 5/5    REFLEXES     Reflexes   Right brachioradialis: 2+  Left brachioradialis: 2+  Right biceps: 2+  Left biceps: 2+  Right triceps: 2+  Left triceps: 2+  Right patellar: 2+  Left patellar: 2+  Right achilles: 2+  Left achilles: 2+  Right plantar: normal  Left  plantar: normal  Right Tierney: absent  Left Tierney: absent  Right ankle clonus: absent  Left ankle clonus: absent  Right pendular knee jerk: absent  Left pendular knee jerk: absent    SENSORY EXAM   Light touch normal.   Right arm light touch: normal  Left arm light touch: normal  Right leg light touch: normal  Left leg light touch: normal  Vibration normal.   Right arm vibration: normal  Left arm vibration: normal  Right leg vibration: normal  Left leg vibration: normal  Proprioception normal.   Right arm proprioception: normal  Left arm proprioception: normal  Right leg proprioception: normal  Left leg proprioception: normal  Pinprick normal.   Right arm pinprick: normal  Left arm pinprick: normal  Right leg pinprick: normal  Left leg pinprick: normal  Graphesthesia: normal  Romberg: negative  Stereognosis: normal    GAIT AND COORDINATION     Gait  Gait: normal     Coordination   Finger to nose coordination: normal  Heel to shin coordination: normal  Tandem walking coordination: normal    Tremor   Resting tremor: absent  Intention tremor: absent  Action tremor: absent       Physical Exam  Vitals and nursing note reviewed.   Constitutional:       Appearance: Normal appearance.   HENT:      Head: Normocephalic.   Eyes:      Extraocular Movements: Extraocular movements intact and EOM normal.      Pupils: Pupils are equal, round, and reactive to light.   Cardiovascular:      Rate and Rhythm: Normal rate and regular rhythm.   Pulmonary:      Effort: Pulmonary effort is normal.      Breath sounds: Normal breath sounds.   Musculoskeletal:         General: No swelling or tenderness. Normal range of motion.      Cervical back: Normal range of motion and neck supple.      Right lower leg: No edema.      Left lower leg: No edema.   Skin:     General: Skin is warm and dry.      Coloration: Skin is not jaundiced.      Findings: No rash.   Neurological:      General: No focal deficit present.      Mental Status: She is alert  and oriented to person, place, and time.      GCS: GCS eye subscore is 4. GCS verbal subscore is 5. GCS motor subscore is 6.      Cranial Nerves: No cranial nerve deficit.      Sensory: No sensory deficit.      Motor: Motor function is intact. No weakness.      Coordination: Coordination is intact. Coordination normal. Finger-Nose-Finger Test, Heel to Shin Test and Romberg Test normal.      Gait: Gait is intact. Gait and tandem walk normal.      Deep Tendon Reflexes: Reflexes normal.      Reflex Scores:       Tricep reflexes are 2+ on the right side and 2+ on the left side.       Bicep reflexes are 2+ on the right side and 2+ on the left side.       Brachioradialis reflexes are 2+ on the right side and 2+ on the left side.       Patellar reflexes are 2+ on the right side and 2+ on the left side.       Achilles reflexes are 2+ on the right side and 2+ on the left side.  Psychiatric:         Mood and Affect: Mood normal.         Speech: Speech normal.         Behavior: Behavior normal.          Assessment:     Problem List Items Addressed This Visit    None  Visit Diagnoses       Intractable chronic migraine with aura and without status migrainosus    -  Primary    Relevant Medications    sumatriptan (IMITREX) 100 MG tablet    Other Relevant Orders    Basic Metabolic Panel    CBC Auto Differential    Vitamin B12 deficiency        Relevant Orders    Folate    Vitamin B12    Vitamin D deficiency        Relevant Orders    Vitamin D    Hypothyroidism, unspecified type        Relevant Orders    TSH             Primary Diagnosis and ICD10  Intractable chronic migraine with aura and without status migrainosus [G43.E19]    Plan:     Patient Instructions   Labs as ordered  Stop taking over the counter medications for headaches  Use imitrex, but will increase the dosing to 100mg and avoid taking more than 3 days of the week  Avoid using the celebrex more than 3 days of the week  Follow-up in 4 weeks    Medications Discontinued  During This Encounter   Medication Reason    sumatriptan (IMITREX) 50 MG tablet        Requested Prescriptions     Signed Prescriptions Disp Refills    sumatriptan (IMITREX) 100 MG tablet 10 tablet 2     Sig: Take 1 tablet at onset of headache.  May repeat in 2 hours.  No more than 2 tabs per 24 hours.  No more than 3 days of the week       Orders Placed This Encounter   Procedures    Basic Metabolic Panel    CBC Auto Differential    Folate    Vitamin D    Vitamin B12    TSH

## 2024-02-16 ENCOUNTER — HOSPITAL ENCOUNTER (OUTPATIENT)
Dept: RADIOLOGY | Facility: HOSPITAL | Age: 44
Discharge: HOME | End: 2024-02-16
Attending: FAMILY MEDICINE
Payer: COMMERCIAL

## 2024-02-16 ENCOUNTER — OFFICE VISIT (OUTPATIENT)
Dept: NEUROLOGY | Facility: CLINIC | Age: 44
End: 2024-02-16
Payer: COMMERCIAL

## 2024-02-16 VITALS
SYSTOLIC BLOOD PRESSURE: 142 MMHG | HEIGHT: 64 IN | HEART RATE: 72 BPM | BODY MASS INDEX: 32.61 KG/M2 | DIASTOLIC BLOOD PRESSURE: 70 MMHG | OXYGEN SATURATION: 99 % | WEIGHT: 191 LBS

## 2024-02-16 DIAGNOSIS — E55.9 VITAMIN D DEFICIENCY: ICD-10-CM

## 2024-02-16 DIAGNOSIS — E03.9 HYPOTHYROIDISM, UNSPECIFIED TYPE: ICD-10-CM

## 2024-02-16 DIAGNOSIS — G43.E19 INTRACTABLE CHRONIC MIGRAINE WITH AURA AND WITHOUT STATUS MIGRAINOSUS: Primary | ICD-10-CM

## 2024-02-16 DIAGNOSIS — R52 PAIN: ICD-10-CM

## 2024-02-16 DIAGNOSIS — E53.8 VITAMIN B12 DEFICIENCY: ICD-10-CM

## 2024-02-16 PROCEDURE — 99214 OFFICE O/P EST MOD 30 MIN: CPT | Mod: PBBFAC,25 | Performed by: NURSE PRACTITIONER

## 2024-02-16 PROCEDURE — 73560 X-RAY EXAM OF KNEE 1 OR 2: CPT | Mod: 26,RT,, | Performed by: RADIOLOGY

## 2024-02-16 PROCEDURE — 99204 OFFICE O/P NEW MOD 45 MIN: CPT | Mod: S$PBB,,, | Performed by: NURSE PRACTITIONER

## 2024-02-16 PROCEDURE — 3077F SYST BP >= 140 MM HG: CPT | Mod: ,,, | Performed by: NURSE PRACTITIONER

## 2024-02-16 PROCEDURE — 73560 X-RAY EXAM OF KNEE 1 OR 2: CPT | Mod: TC,RT

## 2024-02-16 PROCEDURE — 3078F DIAST BP <80 MM HG: CPT | Mod: ,,, | Performed by: NURSE PRACTITIONER

## 2024-02-16 PROCEDURE — 1160F RVW MEDS BY RX/DR IN RCRD: CPT | Mod: ,,, | Performed by: NURSE PRACTITIONER

## 2024-02-16 PROCEDURE — 3008F BODY MASS INDEX DOCD: CPT | Mod: ,,, | Performed by: NURSE PRACTITIONER

## 2024-02-16 PROCEDURE — 1159F MED LIST DOCD IN RCRD: CPT | Mod: ,,, | Performed by: NURSE PRACTITIONER

## 2024-02-16 RX ORDER — SUMATRIPTAN SUCCINATE 100 MG/1
TABLET ORAL
Qty: 10 TABLET | Refills: 2 | Status: SHIPPED | OUTPATIENT
Start: 2024-02-16

## 2024-02-16 NOTE — PATIENT INSTRUCTIONS
Labs as ordered  Stop taking over the counter medications for headaches  Use imitrex, but will increase the dosing to 100mg and avoid taking more than 3 days of the week  Avoid using the celebrex more than 3 days of the week  Follow-up in 4 weeks

## 2024-02-19 ENCOUNTER — TELEPHONE (OUTPATIENT)
Dept: NEUROLOGY | Facility: CLINIC | Age: 44
End: 2024-02-19
Payer: COMMERCIAL

## 2024-02-19 DIAGNOSIS — E55.9 VITAMIN D DEFICIENCY: Primary | ICD-10-CM

## 2024-02-19 RX ORDER — ERGOCALCIFEROL 1.25 MG/1
50000 CAPSULE ORAL
Qty: 4 CAPSULE | Refills: 2 | Status: SHIPPED | OUTPATIENT
Start: 2024-02-19

## 2024-02-19 NOTE — TELEPHONE ENCOUNTER
Pt voiced understanding.    ----- Message from DON Guaman sent at 2/19/2024  5:57 AM CST -----  Vit D is low, sending in supplement for her to take as directed, this can add to her headache symptoms.  Other labs not any significant findings.

## 2024-02-20 ENCOUNTER — TELEPHONE (OUTPATIENT)
Dept: PRIMARY CARE CLINIC | Facility: CLINIC | Age: 44
End: 2024-02-20
Payer: COMMERCIAL

## 2024-03-20 ENCOUNTER — OFFICE VISIT (OUTPATIENT)
Dept: PRIMARY CARE CLINIC | Facility: CLINIC | Age: 44
End: 2024-03-20
Payer: COMMERCIAL

## 2024-03-20 VITALS
BODY MASS INDEX: 33.46 KG/M2 | DIASTOLIC BLOOD PRESSURE: 90 MMHG | OXYGEN SATURATION: 99 % | SYSTOLIC BLOOD PRESSURE: 148 MMHG | WEIGHT: 196 LBS | HEIGHT: 64 IN | HEART RATE: 75 BPM | RESPIRATION RATE: 18 BRPM

## 2024-03-20 DIAGNOSIS — I10 ESSENTIAL (PRIMARY) HYPERTENSION: ICD-10-CM

## 2024-03-20 DIAGNOSIS — G43.109 MIGRAINE WITH AURA AND WITHOUT STATUS MIGRAINOSUS, NOT INTRACTABLE: Primary | ICD-10-CM

## 2024-03-20 DIAGNOSIS — F17.210 CIGARETTE NICOTINE DEPENDENCE WITHOUT COMPLICATION: ICD-10-CM

## 2024-03-20 DIAGNOSIS — M25.569 KNEE PAIN, UNSPECIFIED CHRONICITY, UNSPECIFIED LATERALITY: ICD-10-CM

## 2024-03-20 PROCEDURE — 3077F SYST BP >= 140 MM HG: CPT | Mod: ,,, | Performed by: FAMILY MEDICINE

## 2024-03-20 PROCEDURE — 3008F BODY MASS INDEX DOCD: CPT | Mod: ,,, | Performed by: FAMILY MEDICINE

## 2024-03-20 PROCEDURE — 3080F DIAST BP >= 90 MM HG: CPT | Mod: ,,, | Performed by: FAMILY MEDICINE

## 2024-03-20 PROCEDURE — 99214 OFFICE O/P EST MOD 30 MIN: CPT | Mod: ,,, | Performed by: FAMILY MEDICINE

## 2024-03-20 PROCEDURE — 1159F MED LIST DOCD IN RCRD: CPT | Mod: ,,, | Performed by: FAMILY MEDICINE

## 2024-03-20 RX ORDER — LOSARTAN POTASSIUM 100 MG/1
100 TABLET ORAL DAILY
Qty: 90 TABLET | Refills: 3 | Status: SHIPPED | OUTPATIENT
Start: 2024-03-20 | End: 2025-03-20

## 2024-03-20 NOTE — PROGRESS NOTES
Subjective:      Patient ID: Haleigh Newman is a 43 y.o. female.    Chief Complaint: Follow-up (6week return ck-up), Headache, and Knee Pain (Right knee)    Haleigh Newman a 43 y.o. female presents for follow up on all regular problems which are reviewed and discussed.   Bp still up  Problem List Items Addressed This Visit          Neuro    Migraine without status migrainosus, not intractable - Primary       Cardiac/Vascular    Essential (primary) hypertension       Orthopedic    Knee pain       Other    Nicotine dependence       Past Medical History:  Past Medical History:   Diagnosis Date    Arthritis     Carboxyhemoglobinemia     says she smells it when she drives her car    Depression     Essential (primary) hypertension     Nicotine dependence      Past Surgical History:   Procedure Laterality Date     SECTION       Review of patient's allergies indicates:  No Known Allergies  Current Outpatient Medications on File Prior to Visit   Medication Sig Dispense Refill    celecoxib (CELEBREX) 200 MG capsule Take 1 capsule (200 mg total) by mouth 2 (two) times daily. 30 capsule 5    ergocalciferol (ERGOCALCIFEROL) 50,000 unit Cap Take 1 capsule (50,000 Units total) by mouth every 7 days. 4 capsule 2    EScitalopram oxalate (LEXAPRO) 10 MG tablet Take 1 tablet (10 mg total) by mouth once daily. 90 tablet 3    gabapentin (NEURONTIN) 100 MG capsule Take 100 mg by mouth 2 (two) times daily.      propranoloL (INDERAL LA) 120 MG 24 hr capsule Take 1 capsule (120 mg total) by mouth once daily. 30 capsule 11    sumatriptan (IMITREX) 100 MG tablet Take 1 tablet at onset of headache.  May repeat in 2 hours.  No more than 2 tabs per 24 hours.  No more than 3 days of the week 10 tablet 2    tiZANidine (ZANAFLEX) 4 MG tablet Take 4 mg by mouth every evening.       No current facility-administered medications on file prior to visit.     Social History     Socioeconomic History    Marital status:    Tobacco Use    Smoking  "status: Some Days     Current packs/day: 0.25     Average packs/day: 0.3 packs/day for 16.0 years (4.0 ttl pk-yrs)     Types: Cigarettes    Smokeless tobacco: Never    Tobacco comments:     Has taken chantix in the past.    Substance and Sexual Activity    Alcohol use: Never    Drug use: Never    Sexual activity: Yes     Family History   Problem Relation Age of Onset    Hypertension Mother     Thyroid disease Mother     Hypertension Father     Thyroid disease Sister     Cancer Son     Thyroid disease Maternal Aunt     Cancer Paternal Aunt     Hypertension Maternal Grandmother     Cancer Maternal Grandmother     Diabetes Maternal Grandmother     Hypertension Maternal Grandfather     Diabetes Maternal Grandfather     Hypertension Paternal Grandmother     Cancer Paternal Grandmother     Diabetes Paternal Grandmother     Hypertension Paternal Grandfather     Diabetes Paternal Grandfather        Review of Systems   Constitutional: Negative.  Negative for activity change, appetite change, chills and diaphoresis.   HENT: Negative.  Negative for congestion, ear pain, hearing loss and postnasal drip.    Eyes:  Negative for itching.   Respiratory:  Negative for chest tightness and shortness of breath.    Cardiovascular:  Negative for chest pain.   Gastrointestinal:  Negative for abdominal pain.   Endocrine: Negative for polydipsia.   Genitourinary:  Negative for frequency.   Musculoskeletal:  Negative for back pain.   Neurological:  Negative for headaches.     Objective:     BP (!) 148/90 (BP Location: Right arm)   Pulse 75   Resp 18   Ht 5' 4" (1.626 m)   Wt 88.9 kg (196 lb)   SpO2 99%   BMI 33.64 kg/m²     Physical Exam  Constitutional:       Appearance: Normal appearance. She is obese.   HENT:      Head: Normocephalic and atraumatic.      Right Ear: External ear normal.      Left Ear: External ear normal.      Nose: Nose normal.      Mouth/Throat:      Mouth: Mucous membranes are moist.      Pharynx: Oropharynx is " clear.   Eyes:      Pupils: Pupils are equal, round, and reactive to light.   Cardiovascular:      Rate and Rhythm: Normal rate and regular rhythm.      Heart sounds: Normal heart sounds. No murmur heard.     No gallop.   Pulmonary:      Effort: Pulmonary effort is normal. No respiratory distress.      Breath sounds: Normal breath sounds. No wheezing or rales.   Abdominal:      Palpations: Abdomen is soft.   Musculoskeletal:      Cervical back: Normal range of motion and neck supple.   Skin:     General: Skin is warm and dry.   Neurological:      General: No focal deficit present.      Mental Status: She is alert and oriented to person, place, and time. Mental status is at baseline.   Psychiatric:         Mood and Affect: Mood normal.         Behavior: Behavior normal.         Thought Content: Thought content normal.         Judgment: Judgment normal.   Assessment:     1. Migraine with aura and without status migrainosus, not intractable    2. Essential (primary) hypertension    3. Knee pain, unspecified chronicity, unspecified laterality    4. Cigarette nicotine dependence without complication        Plan:     Problem List Items Addressed This Visit          Neuro    Migraine without status migrainosus, not intractable - Primary       Cardiac/Vascular    Essential (primary) hypertension       Orthopedic    Knee pain       Other    Nicotine dependence     No follow-ups on file.  2m fu  Dc smoking encouraged  Add arb    I am having Haleigh Newman start on losartan. I am also having her maintain her tiZANidine, gabapentin, propranoloL, celecoxib, EScitalopram oxalate, sumatriptan, and ergocalciferol.    Haleigh was seen today for follow-up, headache and knee pain.    Diagnoses and all orders for this visit:    Migraine with aura and without status migrainosus, not intractable    Essential (primary) hypertension    Knee pain, unspecified chronicity, unspecified laterality    Cigarette nicotine dependence without  complication    Other orders  -     losartan (COZAAR) 100 MG tablet; Take 1 tablet (100 mg total) by mouth once daily.      Medications Ordered This Encounter   Medications    losartan (COZAAR) 100 MG tablet     Sig: Take 1 tablet (100 mg total) by mouth once daily.     Dispense:  90 tablet     Refill:  3     .     [unfilled]  No orders of the defined types were placed in this encounter.

## 2024-05-21 ENCOUNTER — TELEPHONE (OUTPATIENT)
Dept: OBSTETRICS AND GYNECOLOGY | Facility: CLINIC | Age: 44
End: 2024-05-21
Payer: COMMERCIAL

## 2024-05-21 ENCOUNTER — OFFICE VISIT (OUTPATIENT)
Dept: PRIMARY CARE CLINIC | Facility: CLINIC | Age: 44
End: 2024-05-21
Payer: COMMERCIAL

## 2024-05-21 VITALS
WEIGHT: 198 LBS | SYSTOLIC BLOOD PRESSURE: 134 MMHG | OXYGEN SATURATION: 98 % | DIASTOLIC BLOOD PRESSURE: 76 MMHG | BODY MASS INDEX: 33.8 KG/M2 | HEIGHT: 64 IN | HEART RATE: 88 BPM | RESPIRATION RATE: 18 BRPM

## 2024-05-21 DIAGNOSIS — F17.210 CIGARETTE NICOTINE DEPENDENCE WITHOUT COMPLICATION: ICD-10-CM

## 2024-05-21 DIAGNOSIS — G43.109 MIGRAINE WITH AURA AND WITHOUT STATUS MIGRAINOSUS, NOT INTRACTABLE: Primary | ICD-10-CM

## 2024-05-21 DIAGNOSIS — M25.569 KNEE PAIN, UNSPECIFIED CHRONICITY, UNSPECIFIED LATERALITY: ICD-10-CM

## 2024-05-21 DIAGNOSIS — I10 ESSENTIAL (PRIMARY) HYPERTENSION: ICD-10-CM

## 2024-05-21 PROCEDURE — 3008F BODY MASS INDEX DOCD: CPT | Mod: ,,, | Performed by: FAMILY MEDICINE

## 2024-05-21 PROCEDURE — 4010F ACE/ARB THERAPY RXD/TAKEN: CPT | Mod: ,,, | Performed by: FAMILY MEDICINE

## 2024-05-21 PROCEDURE — 3075F SYST BP GE 130 - 139MM HG: CPT | Mod: ,,, | Performed by: FAMILY MEDICINE

## 2024-05-21 PROCEDURE — 3078F DIAST BP <80 MM HG: CPT | Mod: ,,, | Performed by: FAMILY MEDICINE

## 2024-05-21 PROCEDURE — 99214 OFFICE O/P EST MOD 30 MIN: CPT | Mod: ,,, | Performed by: FAMILY MEDICINE

## 2024-05-21 PROCEDURE — 1159F MED LIST DOCD IN RCRD: CPT | Mod: ,,, | Performed by: FAMILY MEDICINE

## 2024-05-21 RX ORDER — VARENICLINE TARTRATE 1 MG/1
1 TABLET, FILM COATED ORAL 2 TIMES DAILY
Qty: 60 TABLET | Refills: 5 | Status: SHIPPED | OUTPATIENT
Start: 2024-05-21

## 2024-05-21 NOTE — PROGRESS NOTES
Subjective:      Patient ID: Haleigh Newman is a 43 y.o. female.    Chief Complaint: Follow-up (2 month f/u.wants something to help her stop smoking.)    Haleigh Newman a 43 y.o. female presents for follow up on all regular problems which are reviewed and discussed.   Chantix helped  Problem List Items Addressed This Visit          Neuro    Migraine without status migrainosus, not intractable - Primary       Cardiac/Vascular    Essential (primary) hypertension       Orthopedic    Knee pain       Other    Nicotine dependence       Past Medical History:  Past Medical History:   Diagnosis Date    Arthritis     Carboxyhemoglobinemia     says she smells it when she drives her car    Depression     Essential (primary) hypertension     Nicotine dependence      Past Surgical History:   Procedure Laterality Date     SECTION       Review of patient's allergies indicates:  No Known Allergies  Current Outpatient Medications on File Prior to Visit   Medication Sig Dispense Refill    ergocalciferol (ERGOCALCIFEROL) 50,000 unit Cap Take 1 capsule (50,000 Units total) by mouth every 7 days. 4 capsule 2    EScitalopram oxalate (LEXAPRO) 10 MG tablet Take 1 tablet (10 mg total) by mouth once daily. 90 tablet 3    losartan (COZAAR) 100 MG tablet Take 1 tablet (100 mg total) by mouth once daily. 90 tablet 3    sumatriptan (IMITREX) 100 MG tablet Take 1 tablet at onset of headache.  May repeat in 2 hours.  No more than 2 tabs per 24 hours.  No more than 3 days of the week 10 tablet 2    tiZANidine (ZANAFLEX) 4 MG tablet Take 4 mg by mouth every evening.      celecoxib (CELEBREX) 200 MG capsule Take 1 capsule (200 mg total) by mouth 2 (two) times daily. (Patient not taking: Reported on 2024) 30 capsule 5    gabapentin (NEURONTIN) 100 MG capsule Take 100 mg by mouth 2 (two) times daily. (Patient not taking: Reported on 2024)      propranoloL (INDERAL LA) 120 MG 24 hr capsule Take 1 capsule (120 mg total) by mouth once daily.  "(Patient not taking: Reported on 5/21/2024) 30 capsule 11     No current facility-administered medications on file prior to visit.     Social History     Socioeconomic History    Marital status:    Tobacco Use    Smoking status: Some Days     Types: Cigarettes     Start date: 10/2007    Smokeless tobacco: Never    Tobacco comments:     Has taken chantix in the past.    Substance and Sexual Activity    Alcohol use: Never    Drug use: Never    Sexual activity: Yes     Partners: Male     Family History   Problem Relation Name Age of Onset    Hypertension Mother      Thyroid disease Mother      Hypertension Father      Thyroid disease Sister      Cancer Son      Thyroid disease Maternal Aunt      Cancer Paternal Aunt      Hypertension Maternal Grandmother      Cancer Maternal Grandmother      Diabetes Maternal Grandmother      Hypertension Maternal Grandfather      Diabetes Maternal Grandfather      Hypertension Paternal Grandmother      Cancer Paternal Grandmother      Diabetes Paternal Grandmother      Hypertension Paternal Grandfather      Diabetes Paternal Grandfather         Review of Systems   Constitutional: Negative.  Negative for activity change, appetite change, chills and diaphoresis.   HENT: Negative.  Negative for congestion, ear pain, hearing loss and postnasal drip.    Eyes:  Negative for itching.   Respiratory:  Negative for chest tightness and shortness of breath.    Cardiovascular:  Negative for chest pain.   Gastrointestinal:  Negative for abdominal pain.   Endocrine: Negative for polydipsia.   Genitourinary:  Negative for frequency.   Musculoskeletal:  Negative for back pain.   Neurological:  Negative for headaches.       Objective:     /76 (BP Location: Left arm, Patient Position: Sitting, BP Method: Large (Manual))   Pulse 88   Resp 18   Ht 5' 4" (1.626 m)   Wt 89.8 kg (198 lb)   LMP 05/13/2024 (Exact Date)   SpO2 98%   BMI 33.99 kg/m²     Physical Exam  Constitutional:       " Appearance: Normal appearance. She is obese.   HENT:      Head: Normocephalic and atraumatic.      Right Ear: External ear normal.      Left Ear: External ear normal.      Nose: Nose normal.      Mouth/Throat:      Mouth: Mucous membranes are moist.      Pharynx: Oropharynx is clear.   Eyes:      Pupils: Pupils are equal, round, and reactive to light.   Cardiovascular:      Rate and Rhythm: Normal rate and regular rhythm.      Heart sounds: Normal heart sounds. No murmur heard.     No gallop.   Pulmonary:      Effort: Pulmonary effort is normal. No respiratory distress.      Breath sounds: Normal breath sounds. No wheezing or rales.   Abdominal:      Palpations: Abdomen is soft.   Musculoskeletal:      Cervical back: Normal range of motion and neck supple.   Skin:     General: Skin is warm and dry.   Neurological:      General: No focal deficit present.      Mental Status: She is alert and oriented to person, place, and time. Mental status is at baseline.   Psychiatric:         Mood and Affect: Mood normal.         Behavior: Behavior normal.         Thought Content: Thought content normal.         Judgment: Judgment normal.         1. Migraine with aura and without status migrainosus, not intractable    2. Essential (primary) hypertension    3. Knee pain, unspecified chronicity, unspecified laterality    4. Cigarette nicotine dependence without complication        Plan:     Problem List Items Addressed This Visit          Neuro    Migraine without status migrainosus, not intractable - Primary       Cardiac/Vascular    Essential (primary) hypertension       Orthopedic    Knee pain       Other    Nicotine dependence     No follow-ups on file.  6m fu    I am having Haleigh Newman start on varenicline. I am also having her maintain her tiZANidine, gabapentin, propranoloL, celecoxib, EScitalopram oxalate, sumatriptan, ergocalciferol, and losartan.    Haleigh was seen today for follow-up.    Diagnoses and all orders for this  visit:    Migraine with aura and without status migrainosus, not intractable    Essential (primary) hypertension    Knee pain, unspecified chronicity, unspecified laterality    Cigarette nicotine dependence without complication    Other orders  -     varenicline (CHANTIX) 1 mg Tab; Take 1 tablet (1 mg total) by mouth 2 (two) times daily.      Medications Ordered This Encounter   Medications    varenicline (CHANTIX) 1 mg Tab     Sig: Take 1 tablet (1 mg total) by mouth 2 (two) times daily.     Dispense:  60 tablet     Refill:  5     [unfilled]  No orders of the defined types were placed in this encounter.

## 2024-05-21 NOTE — TELEPHONE ENCOUNTER
----- Message from Moody Belcher sent at 5/21/2024 10:15 AM CDT -----  Regarding: New Patient  Patient wanted to schedule an appointment with Dr. Ma for the first time for a yearly example. Patient was previously seeing another Doctor at [facility] . Please call this patient and schedule an appointment to see Dr. Ma .      Thanks Beatris   Please call pt @ [phone]   Addended by: TK LARKIN on: 8/12/2022 04:10 PM     Modules accepted: Orders

## 2024-09-09 ENCOUNTER — OFFICE VISIT (OUTPATIENT)
Dept: PRIMARY CARE CLINIC | Facility: CLINIC | Age: 44
End: 2024-09-09
Payer: COMMERCIAL

## 2024-09-09 VITALS
RESPIRATION RATE: 18 BRPM | OXYGEN SATURATION: 99 % | BODY MASS INDEX: 34.83 KG/M2 | DIASTOLIC BLOOD PRESSURE: 90 MMHG | WEIGHT: 204 LBS | SYSTOLIC BLOOD PRESSURE: 138 MMHG | HEIGHT: 64 IN | HEART RATE: 90 BPM

## 2024-09-09 DIAGNOSIS — I10 ESSENTIAL (PRIMARY) HYPERTENSION: ICD-10-CM

## 2024-09-09 DIAGNOSIS — G43.109 MIGRAINE WITH AURA AND WITHOUT STATUS MIGRAINOSUS, NOT INTRACTABLE: ICD-10-CM

## 2024-09-09 DIAGNOSIS — F17.210 CIGARETTE NICOTINE DEPENDENCE WITHOUT COMPLICATION: ICD-10-CM

## 2024-09-09 DIAGNOSIS — Z13.220 SCREENING FOR LIPOID DISORDERS: Primary | ICD-10-CM

## 2024-09-09 DIAGNOSIS — Z00.00 ROUTINE GENERAL MEDICAL EXAMINATION AT A HEALTH CARE FACILITY: Primary | ICD-10-CM

## 2024-09-09 DIAGNOSIS — Z13.1 SCREENING FOR DIABETES MELLITUS: ICD-10-CM

## 2024-09-09 DIAGNOSIS — M25.569 KNEE PAIN, UNSPECIFIED CHRONICITY, UNSPECIFIED LATERALITY: ICD-10-CM

## 2024-09-09 PROCEDURE — 3080F DIAST BP >= 90 MM HG: CPT | Mod: ,,, | Performed by: FAMILY MEDICINE

## 2024-09-09 PROCEDURE — 3008F BODY MASS INDEX DOCD: CPT | Mod: ,,, | Performed by: FAMILY MEDICINE

## 2024-09-09 PROCEDURE — 4010F ACE/ARB THERAPY RXD/TAKEN: CPT | Mod: ,,, | Performed by: FAMILY MEDICINE

## 2024-09-09 PROCEDURE — 3075F SYST BP GE 130 - 139MM HG: CPT | Mod: ,,, | Performed by: FAMILY MEDICINE

## 2024-09-09 PROCEDURE — 1159F MED LIST DOCD IN RCRD: CPT | Mod: ,,, | Performed by: FAMILY MEDICINE

## 2024-09-09 PROCEDURE — 99396 PREV VISIT EST AGE 40-64: CPT | Mod: ,,, | Performed by: FAMILY MEDICINE

## 2024-09-09 NOTE — PROGRESS NOTES
Subjective:      Patient ID: Haleigh Newman is a 44 y.o. female.    Chief Complaint: Healthy You    Haleigh Newman a 44 y.o. female presents for follow up on all regular problems which are reviewed and discussed.     Problem List Items Addressed This Visit          Neuro    Migraine without status migrainosus, not intractable       Cardiac/Vascular    Essential (primary) hypertension       Orthopedic    Knee pain       Other    Nicotine dependence    Routine general medical examination at a health care facility - Primary       Past Medical History:  Past Medical History:   Diagnosis Date    Arthritis     Carboxyhemoglobinemia     says she smells it when she drives her car    Depression     Essential (primary) hypertension     Nicotine dependence      Past Surgical History:   Procedure Laterality Date     SECTION       Review of patient's allergies indicates:  No Known Allergies  Current Outpatient Medications on File Prior to Visit   Medication Sig Dispense Refill    ergocalciferol (ERGOCALCIFEROL) 50,000 unit Cap Take 1 capsule (50,000 Units total) by mouth every 7 days. 4 capsule 2    EScitalopram oxalate (LEXAPRO) 10 MG tablet Take 1 tablet (10 mg total) by mouth once daily. 90 tablet 3    losartan (COZAAR) 100 MG tablet Take 1 tablet (100 mg total) by mouth once daily. 90 tablet 3    sumatriptan (IMITREX) 100 MG tablet Take 1 tablet at onset of headache.  May repeat in 2 hours.  No more than 2 tabs per 24 hours.  No more than 3 days of the week 10 tablet 2    [DISCONTINUED] celecoxib (CELEBREX) 200 MG capsule Take 1 capsule (200 mg total) by mouth 2 (two) times daily. (Patient not taking: Reported on 2024) 30 capsule 5    [DISCONTINUED] gabapentin (NEURONTIN) 100 MG capsule Take 100 mg by mouth 2 (two) times daily. (Patient not taking: Reported on 2024)      [DISCONTINUED] propranoloL (INDERAL LA) 120 MG 24 hr capsule Take 1 capsule (120 mg total) by mouth once daily. (Patient not taking: Reported  on 5/21/2024) 30 capsule 11    [DISCONTINUED] tiZANidine (ZANAFLEX) 4 MG tablet Take 4 mg by mouth every evening. (Patient not taking: Reported on 9/9/2024)      [DISCONTINUED] varenicline (CHANTIX) 1 mg Tab Take 1 tablet (1 mg total) by mouth 2 (two) times daily. (Patient not taking: Reported on 9/9/2024) 60 tablet 5     No current facility-administered medications on file prior to visit.     Social History     Socioeconomic History    Marital status:    Tobacco Use    Smoking status: Some Days     Types: Cigarettes     Start date: 10/2007    Smokeless tobacco: Never    Tobacco comments:     Has taken chantix in the past.    Substance and Sexual Activity    Alcohol use: Never    Drug use: Never    Sexual activity: Yes     Partners: Male     Family History   Problem Relation Name Age of Onset    Hypertension Mother      Thyroid disease Mother      Hypertension Father      Thyroid disease Sister      Cancer Son      Thyroid disease Maternal Aunt      Cancer Paternal Aunt      Hypertension Maternal Grandmother      Cancer Maternal Grandmother      Diabetes Maternal Grandmother      Hypertension Maternal Grandfather      Diabetes Maternal Grandfather      Hypertension Paternal Grandmother      Cancer Paternal Grandmother      Diabetes Paternal Grandmother      Hypertension Paternal Grandfather      Diabetes Paternal Grandfather         Review of Systems   Constitutional: Negative.  Negative for activity change, appetite change, chills and diaphoresis.   HENT: Negative.  Negative for congestion, ear pain, hearing loss and postnasal drip.    Eyes:  Negative for itching.   Respiratory:  Negative for chest tightness and shortness of breath.    Cardiovascular:  Negative for chest pain.   Gastrointestinal:  Negative for abdominal pain.   Endocrine: Negative for polydipsia.   Genitourinary:  Negative for frequency.   Musculoskeletal:  Negative for back pain.   Neurological:  Negative for headaches.       Objective:  "    BP (!) 138/90 (BP Location: Right arm, Patient Position: Sitting, BP Method: Large (Manual))   Pulse 90   Resp 18   Ht 5' 4" (1.626 m)   Wt 92.5 kg (204 lb)   SpO2 99%   BMI 35.02 kg/m²     Physical Exam  Constitutional:       Appearance: Normal appearance. She is obese.   HENT:      Head: Normocephalic and atraumatic.      Right Ear: External ear normal.      Left Ear: External ear normal.      Nose: Nose normal.      Mouth/Throat:      Mouth: Mucous membranes are moist.      Pharynx: Oropharynx is clear.   Eyes:      Pupils: Pupils are equal, round, and reactive to light.   Cardiovascular:      Rate and Rhythm: Normal rate and regular rhythm.      Heart sounds: Normal heart sounds. No murmur heard.     No gallop.   Pulmonary:      Effort: Pulmonary effort is normal. No respiratory distress.      Breath sounds: Normal breath sounds. No wheezing or rales.   Abdominal:      Palpations: Abdomen is soft.   Musculoskeletal:      Cervical back: Normal range of motion and neck supple.   Skin:     General: Skin is warm and dry.   Neurological:      General: No focal deficit present.      Mental Status: She is alert and oriented to person, place, and time. Mental status is at baseline.   Psychiatric:         Mood and Affect: Mood normal.         Behavior: Behavior normal.         Thought Content: Thought content normal.         Judgment: Judgment normal.         1. Routine general medical examination at a health care facility    2. Cigarette nicotine dependence without complication    3. Knee pain, unspecified chronicity, unspecified laterality    4. Essential (primary) hypertension    5. Migraine with aura and without status migrainosus, not intractable        Plan:     Problem List Items Addressed This Visit          Neuro    Migraine without status migrainosus, not intractable       Cardiac/Vascular    Essential (primary) hypertension       Orthopedic    Knee pain       Other    Nicotine dependence    Routine " general medical examination at a health care facility - Primary     No follow-ups on file.  Education  Lose wt  Dc smoking  3-4m fu    I am having Haleigh Newman maintain her EScitalopram oxalate, sumatriptan, ergocalciferol, and losartan.    Haleigh was seen today for healthy you.    Diagnoses and all orders for this visit:    Routine general medical examination at a health care facility    Cigarette nicotine dependence without complication    Knee pain, unspecified chronicity, unspecified laterality    Essential (primary) hypertension    Migraine with aura and without status migrainosus, not intractable         [unfilled]  No orders of the defined types were placed in this encounter.

## 2024-09-26 ENCOUNTER — OFFICE VISIT (OUTPATIENT)
Dept: PRIMARY CARE CLINIC | Facility: CLINIC | Age: 44
End: 2024-09-26
Payer: COMMERCIAL

## 2024-09-26 VITALS
SYSTOLIC BLOOD PRESSURE: 164 MMHG | RESPIRATION RATE: 18 BRPM | HEART RATE: 72 BPM | BODY MASS INDEX: 35.17 KG/M2 | DIASTOLIC BLOOD PRESSURE: 96 MMHG | OXYGEN SATURATION: 99 % | HEIGHT: 64 IN | WEIGHT: 206 LBS

## 2024-09-26 DIAGNOSIS — I10 ESSENTIAL (PRIMARY) HYPERTENSION: ICD-10-CM

## 2024-09-26 DIAGNOSIS — G43.109 MIGRAINE WITH AURA AND WITHOUT STATUS MIGRAINOSUS, NOT INTRACTABLE: Primary | ICD-10-CM

## 2024-09-26 DIAGNOSIS — M25.569 KNEE PAIN, UNSPECIFIED CHRONICITY, UNSPECIFIED LATERALITY: ICD-10-CM

## 2024-09-26 DIAGNOSIS — R52 PAIN: ICD-10-CM

## 2024-09-26 RX ORDER — KETOROLAC TROMETHAMINE 30 MG/ML
60 INJECTION, SOLUTION INTRAMUSCULAR; INTRAVENOUS
Status: COMPLETED | OUTPATIENT
Start: 2024-09-26 | End: 2024-09-26

## 2024-09-26 RX ORDER — METOPROLOL SUCCINATE 50 MG/1
50 TABLET, EXTENDED RELEASE ORAL DAILY
Qty: 90 TABLET | Refills: 3 | Status: SHIPPED | OUTPATIENT
Start: 2024-09-26 | End: 2025-09-26

## 2024-09-26 RX ADMIN — KETOROLAC TROMETHAMINE 60 MG: 30 INJECTION, SOLUTION INTRAMUSCULAR; INTRAVENOUS at 09:09

## 2024-09-26 NOTE — PROGRESS NOTES
Subjective:      Patient ID: Haleigh Newman is a 44 y.o. female.    Chief Complaint: Hypertension (163/107)    Haleigh Newman a 44 y.o. female presents for follow up on all regular problems which are reviewed and discussed.   Hasn't taken bp rx  Problem List Items Addressed This Visit          Neuro    Migraine without status migrainosus, not intractable - Primary       Cardiac/Vascular    Essential (primary) hypertension       Orthopedic    Knee pain       Other    Pain       Past Medical History:  Past Medical History:   Diagnosis Date    Arthritis     Carboxyhemoglobinemia     says she smells it when she drives her car    Depression     Essential (primary) hypertension     Nicotine dependence      Past Surgical History:   Procedure Laterality Date     SECTION       Review of patient's allergies indicates:  No Known Allergies  Current Outpatient Medications on File Prior to Visit   Medication Sig Dispense Refill    ergocalciferol (ERGOCALCIFEROL) 50,000 unit Cap Take 1 capsule (50,000 Units total) by mouth every 7 days. 4 capsule 2    EScitalopram oxalate (LEXAPRO) 10 MG tablet Take 1 tablet (10 mg total) by mouth once daily. 90 tablet 3    losartan (COZAAR) 100 MG tablet Take 1 tablet (100 mg total) by mouth once daily. 90 tablet 3    sumatriptan (IMITREX) 100 MG tablet Take 1 tablet at onset of headache.  May repeat in 2 hours.  No more than 2 tabs per 24 hours.  No more than 3 days of the week 10 tablet 2     No current facility-administered medications on file prior to visit.     Social History     Socioeconomic History    Marital status:    Tobacco Use    Smoking status: Some Days     Types: Cigarettes     Start date: 10/2007    Smokeless tobacco: Never    Tobacco comments:     Has taken chantix in the past.    Substance and Sexual Activity    Alcohol use: Never    Drug use: Never    Sexual activity: Yes     Partners: Male     Family History   Problem Relation Name Age of Onset    Hypertension  "Mother      Thyroid disease Mother      Hypertension Father      Thyroid disease Sister      Cancer Son      Thyroid disease Maternal Aunt      Cancer Paternal Aunt      Hypertension Maternal Grandmother      Cancer Maternal Grandmother      Diabetes Maternal Grandmother      Hypertension Maternal Grandfather      Diabetes Maternal Grandfather      Hypertension Paternal Grandmother      Cancer Paternal Grandmother      Diabetes Paternal Grandmother      Hypertension Paternal Grandfather      Diabetes Paternal Grandfather         Review of Systems   Constitutional: Negative.  Negative for activity change, appetite change, chills and diaphoresis.   HENT: Negative.  Negative for congestion, ear pain, hearing loss and postnasal drip.    Eyes:  Negative for itching.   Respiratory:  Negative for chest tightness and shortness of breath.    Cardiovascular:  Negative for chest pain.   Gastrointestinal:  Negative for abdominal pain.   Endocrine: Negative for polydipsia.   Genitourinary:  Negative for frequency.   Musculoskeletal:  Negative for back pain.   Neurological:  Positive for headaches.     Objective:     BP (!) 164/96 (BP Location: Left arm, Patient Position: Sitting, BP Method: Large (Manual))   Pulse 72   Resp 18   Ht 5' 4" (1.626 m)   Wt 93.4 kg (206 lb)   SpO2 99%   BMI 35.36 kg/m²     Physical Exam  Constitutional:       Appearance: Normal appearance. She is obese.   HENT:      Head: Normocephalic and atraumatic.      Right Ear: External ear normal.      Left Ear: External ear normal.      Nose: Nose normal.      Mouth/Throat:      Mouth: Mucous membranes are moist.      Pharynx: Oropharynx is clear.   Eyes:      Pupils: Pupils are equal, round, and reactive to light.   Cardiovascular:      Rate and Rhythm: Normal rate and regular rhythm.      Heart sounds: Normal heart sounds. No murmur heard.     No gallop.   Pulmonary:      Effort: Pulmonary effort is normal. No respiratory distress.      Breath sounds: " Normal breath sounds. No wheezing or rales.   Abdominal:      Palpations: Abdomen is soft.   Musculoskeletal:      Cervical back: Normal range of motion and neck supple.   Skin:     General: Skin is warm and dry.   Neurological:      General: No focal deficit present.      Mental Status: She is alert and oriented to person, place, and time. Mental status is at baseline.   Psychiatric:         Mood and Affect: Mood normal.         Behavior: Behavior normal.         Thought Content: Thought content normal.         Judgment: Judgment normal.   Assessment:     1. Migraine with aura and without status migrainosus, not intractable    2. Essential (primary) hypertension    3. Pain    4. Knee pain, unspecified chronicity, unspecified laterality        Plan:     Problem List Items Addressed This Visit          Neuro    Migraine without status migrainosus, not intractable - Primary       Cardiac/Vascular    Essential (primary) hypertension       Orthopedic    Knee pain       Other    Pain     No follow-ups on file.  New rx added  Shot for dx n1    I am having Haleigh Newman start on metoprolol succinate. I am also having her maintain her EScitalopram oxalate, sumatriptan, ergocalciferol, and losartan. We administered ketorolac.    Haleigh was seen today for hypertension.    Diagnoses and all orders for this visit:    Migraine with aura and without status migrainosus, not intractable    Essential (primary) hypertension    Pain    Knee pain, unspecified chronicity, unspecified laterality    Other orders  -     ketorolac injection 60 mg  -     metoprolol succinate (TOPROL-XL) 50 MG 24 hr tablet; Take 1 tablet (50 mg total) by mouth once daily.      Medications Ordered This Encounter   Medications    ketorolac injection 60 mg    metoprolol succinate (TOPROL-XL) 50 MG 24 hr tablet     Sig: Take 1 tablet (50 mg total) by mouth once daily.     Dispense:  90 tablet     Refill:  3     .     [unfilled]  No orders of the defined types were  placed in this encounter.

## 2024-10-30 ENCOUNTER — TELEPHONE (OUTPATIENT)
Dept: EMERGENCY MEDICINE | Facility: HOSPITAL | Age: 44
End: 2024-10-30
Payer: COMMERCIAL

## 2024-11-05 ENCOUNTER — OFFICE VISIT (OUTPATIENT)
Dept: OBSTETRICS AND GYNECOLOGY | Facility: CLINIC | Age: 44
End: 2024-11-05
Payer: COMMERCIAL

## 2024-11-05 VITALS
HEIGHT: 64 IN | BODY MASS INDEX: 36.3 KG/M2 | HEART RATE: 80 BPM | DIASTOLIC BLOOD PRESSURE: 79 MMHG | WEIGHT: 212.63 LBS | OXYGEN SATURATION: 98 % | SYSTOLIC BLOOD PRESSURE: 159 MMHG

## 2024-11-05 DIAGNOSIS — Z12.31 ENCOUNTER FOR SCREENING MAMMOGRAM FOR BREAST CANCER: ICD-10-CM

## 2024-11-05 DIAGNOSIS — N92.0 MENORRHAGIA WITH REGULAR CYCLE: ICD-10-CM

## 2024-11-05 DIAGNOSIS — Z12.4 SCREENING FOR MALIGNANT NEOPLASM OF THE CERVIX: Primary | ICD-10-CM

## 2024-11-05 DIAGNOSIS — N89.8 VAGINAL DISCHARGE: ICD-10-CM

## 2024-11-05 DIAGNOSIS — Z30.9 ENCOUNTER FOR CONTRACEPTIVE MANAGEMENT, UNSPECIFIED TYPE: ICD-10-CM

## 2024-11-05 LAB
CANDIDA SPECIES: NEGATIVE
GARDNERELLA: POSITIVE
TRICHOMONAS: NEGATIVE

## 2024-11-05 PROCEDURE — 3078F DIAST BP <80 MM HG: CPT | Mod: ,,, | Performed by: OBSTETRICS & GYNECOLOGY

## 2024-11-05 PROCEDURE — 1160F RVW MEDS BY RX/DR IN RCRD: CPT | Mod: ,,, | Performed by: OBSTETRICS & GYNECOLOGY

## 2024-11-05 PROCEDURE — 99999 PR PBB SHADOW E&M-EST. PATIENT-LVL III: CPT | Mod: PBBFAC,,, | Performed by: OBSTETRICS & GYNECOLOGY

## 2024-11-05 PROCEDURE — 87624 HPV HI-RISK TYP POOLED RSLT: CPT | Mod: ,,, | Performed by: CLINICAL MEDICAL LABORATORY

## 2024-11-05 PROCEDURE — 4010F ACE/ARB THERAPY RXD/TAKEN: CPT | Mod: ,,, | Performed by: OBSTETRICS & GYNECOLOGY

## 2024-11-05 PROCEDURE — 3008F BODY MASS INDEX DOCD: CPT | Mod: ,,, | Performed by: OBSTETRICS & GYNECOLOGY

## 2024-11-05 PROCEDURE — 87660 TRICHOMONAS VAGIN DIR PROBE: CPT | Performed by: OBSTETRICS & GYNECOLOGY

## 2024-11-05 PROCEDURE — 1159F MED LIST DOCD IN RCRD: CPT | Mod: ,,, | Performed by: OBSTETRICS & GYNECOLOGY

## 2024-11-05 PROCEDURE — 3077F SYST BP >= 140 MM HG: CPT | Mod: ,,, | Performed by: OBSTETRICS & GYNECOLOGY

## 2024-11-05 PROCEDURE — 99213 OFFICE O/P EST LOW 20 MIN: CPT | Mod: PBBFAC | Performed by: OBSTETRICS & GYNECOLOGY

## 2024-11-05 PROCEDURE — 88142 CYTOPATH C/V THIN LAYER: CPT | Mod: TC,GCY | Performed by: OBSTETRICS & GYNECOLOGY

## 2024-11-05 PROCEDURE — 99386 PREV VISIT NEW AGE 40-64: CPT | Mod: S$PBB,,, | Performed by: OBSTETRICS & GYNECOLOGY

## 2024-11-05 RX ORDER — TRANEXAMIC ACID 650 MG/1
1300 TABLET ORAL 3 TIMES DAILY
Qty: 30 TABLET | Refills: 11 | Status: SHIPPED | OUTPATIENT
Start: 2024-11-05 | End: 2024-11-10

## 2024-11-05 NOTE — PROGRESS NOTES
Annual Exam    Assessment/Plan:  44 y.o.  presenting for her annual exam:    Problem List Items Addressed This Visit    None  Visit Diagnoses       Screening for malignant neoplasm of the cervix    -  Primary    Relevant Orders    ThinPrep Pap Test    Encounter for screening mammogram for breast cancer        Relevant Orders    Mammo Digital Screening Bilat    Encounter for contraceptive management, unspecified type        Relevant Orders    POCT urine pregnancy    Menorrhagia with regular cycle        Relevant Medications    tranexamic acid (LYSTEDA) 650 mg tablet    Other Relevant Orders    CBC Auto Differential    TSH    US Pelvis Complete Non OB              CC:   Chief Complaint   Patient presents with    Annual Exam     Pt c/o cramping when passing blood clots started about 5 months ike       HPI:  44 y.o.  presents for her gynecologic annual exam.    Patient seen and examined.  ***    Health Maintenance:    Birth control: ***  Pregnancy plans: ***   Safe relationship: ***  Healthy diet: ***   Exercise: ***  PCP: ***     Screening:  Last pap smear: ***  History of abnormal pap smears: ***  STI screening: ***  Mammogram: ***  Colonoscopy or colon cancer screening: ***    Review of Systems: The following ROS was otherwise negative, except as noted in the HPI:  constitutional, HEENT, respiratory, cardiovascular, gastrointestinal, genitourinary, skin, musculoskeletal, neurological, psych    Primary Care Physician: Betina Welsh, JENNIFERP    Obstetric History  OB History    Para Term  AB Living   3 3 1 2   3   SAB IAB Ectopic Multiple Live Births           3      # Outcome Date GA Lbr Maximo/2nd Weight Sex Type Anes PTL Lv   3   34w0d    CS-Classical   JAYESH   2   36w0d    CS-Classical   JAYESH   1 Term  37w0d    Vag-Breech   JAYESH       Gynecologic History  Menstrual History:   LMP: ***    Age of Menarche: ***   Age of Menopause: ***      Sexual History:    Contraception: see  above   Currently {is/is not:86785} sexually active   {Denies/Admits to:80706} history of STIs   {Denies/Admits to:95225} sexual problems    Pap History:   History of abnormal pap smears: see above   Last pap: see above    Medical History:  Past Medical History:   Diagnosis Date    Arthritis     Carboxyhemoglobinemia     says she smells it when she drives her car    Depression     Essential (primary) hypertension     Nicotine dependence        Medications:  Medication List with Changes/Refills   New Medications    TRANEXAMIC ACID (LYSTEDA) 650 MG TABLET    Take 2 tablets (1,300 mg total) by mouth 3 (three) times daily. On 5 heaviest days of menses for 5 days   Current Medications    ERGOCALCIFEROL (ERGOCALCIFEROL) 50,000 UNIT CAP    Take 1 capsule (50,000 Units total) by mouth every 7 days.    ESCITALOPRAM OXALATE (LEXAPRO) 10 MG TABLET    Take 1 tablet (10 mg total) by mouth once daily.    LOSARTAN (COZAAR) 100 MG TABLET    Take 1 tablet (100 mg total) by mouth once daily.    METOPROLOL SUCCINATE (TOPROL-XL) 50 MG 24 HR TABLET    Take 1 tablet (50 mg total) by mouth once daily.    SUMATRIPTAN (IMITREX) 100 MG TABLET    Take 1 tablet at onset of headache.  May repeat in 2 hours.  No more than 2 tabs per 24 hours.  No more than 3 days of the week         Surgical History:  Past Surgical History:   Procedure Laterality Date     SECTION         Allergies:  Review of patient's allergies indicates:  No Known Allergies    Family History:  Family History   Problem Relation Name Age of Onset    Hypertension Mother      Thyroid disease Mother      Hypertension Father      Thyroid disease Sister      Cancer Son      Thyroid disease Maternal Aunt      Cancer Paternal Aunt      Hypertension Maternal Grandmother      Cancer Maternal Grandmother      Diabetes Maternal Grandmother      Hypertension Maternal Grandfather      Diabetes Maternal Grandfather      Hypertension Paternal Grandmother      Cancer Paternal  "Grandmother      Diabetes Paternal Grandmother      Hypertension Paternal Grandfather      Diabetes Paternal Grandfather         Social History:  Social History     Socioeconomic History    Marital status:    Tobacco Use    Smoking status: Some Days     Types: Cigarettes     Start date: 10/2007    Smokeless tobacco: Never    Tobacco comments:     Has taken chantix in the past.    Substance and Sexual Activity    Alcohol use: Never    Drug use: Never    Sexual activity: Yes     Partners: Male       Objective:  Body mass index is 36.49 kg/m².    BP (!) 159/79   Pulse 80   Ht 5' 4" (1.626 m)   Wt 96.4 kg (212 lb 9.6 oz)   LMP 10/20/2024   SpO2 98%   BMI 36.49 kg/m²     General: Alert, well appearing, no acute distress  Head: Normocephalic, atraumatic  Breasts: Symmetric, non-tender to palpation, no skin changes, palpable axillary lymph nodes or masses noted  Lungs: Unlabored respirations. Clear to auscultation bilaterally.  Cardiovascular: Regular rate and rhythm.   Abdomen: Soft, nontender, nondistended   Pelvic: Exam chaperoned by female assistant.   External: normal female genitalia, no masses or lesions   Vagina: pale, pink mucosa with decreased rugae, minimal clear discharge.  Cervix: no masses or lesions, nontender. ?atrophic. PAP performed.  Uterus: approx *** weeks, mobile, midline, nontender  Adnexa: no masses or fullness, nontender  Rectovaginal: deferred  Extremities: No redness or tenderness  Skin: Well perfused, normal coloration and turgor, no lesions or rashes visualized  Neuro: Alert, oriented, normal speech, no focal deficits, moves extremities appropriately  Psych: Normal mood and behavior.     Dawood Ma MD     " visualized  Neuro: Alert, oriented, normal speech, no focal deficits, moves extremities appropriately  Psych: Normal mood and behavior.     Dawood Ma MD

## 2024-11-07 ENCOUNTER — TELEPHONE (OUTPATIENT)
Dept: OBSTETRICS AND GYNECOLOGY | Facility: CLINIC | Age: 44
End: 2024-11-07
Payer: COMMERCIAL

## 2024-11-07 ENCOUNTER — PATIENT MESSAGE (OUTPATIENT)
Dept: OBSTETRICS AND GYNECOLOGY | Facility: CLINIC | Age: 44
End: 2024-11-07
Payer: COMMERCIAL

## 2024-11-07 DIAGNOSIS — B96.89 BACTERIAL VAGINOSIS: Primary | ICD-10-CM

## 2024-11-07 DIAGNOSIS — N76.0 BACTERIAL VAGINOSIS: Primary | ICD-10-CM

## 2024-11-07 LAB
GH SERPL-MCNC: NORMAL NG/ML
INSULIN SERPL-ACNC: NORMAL U[IU]/ML
LAB AP CLINICAL INFORMATION: NORMAL
LAB AP GYN INTERPRETATION: NEGATIVE
LAB AP PAP DISCLAIMER COMMENTS: NORMAL
RENIN PLAS-CCNC: NORMAL NG/ML/H

## 2024-11-07 RX ORDER — TINIDAZOLE 500 MG/1
1 TABLET ORAL 2 TIMES DAILY
Qty: 8 TABLET | Refills: 0 | Status: SHIPPED | OUTPATIENT
Start: 2024-11-07 | End: 2024-11-09

## 2024-11-07 NOTE — TELEPHONE ENCOUNTER
Call can't be completed as dialed.       ----- Message from Dawood Ma MD sent at 11/7/2024 10:22 AM CST -----  Please call her and tell her that she has BV. I'm sending Tinidazole to her pharmacy.

## 2024-11-08 DIAGNOSIS — D50.0 IRON DEFICIENCY ANEMIA DUE TO CHRONIC BLOOD LOSS: Primary | ICD-10-CM

## 2024-11-08 LAB
HPV 16: NEGATIVE
HPV 18: NEGATIVE
HPV OTHER: NEGATIVE

## 2024-11-08 RX ORDER — FERROUS SULFATE 325(65) MG
325 TABLET ORAL
Qty: 30 TABLET | Refills: 6 | Status: SHIPPED | OUTPATIENT
Start: 2024-11-08

## 2024-11-11 ENCOUNTER — TELEPHONE (OUTPATIENT)
Dept: OBSTETRICS AND GYNECOLOGY | Facility: CLINIC | Age: 44
End: 2024-11-11
Payer: COMMERCIAL

## 2024-11-11 NOTE — TELEPHONE ENCOUNTER
----- Message from Dawood Ma MD sent at 11/8/2024 12:58 PM CST -----  Please call her and tell her that her TSH is normal. She is mildly anemic. This should improve with taking the TXA to decrease menstrual flow. However, over the next 1-2 months I would recommend an iron supplement to help with this. I'll send one to her pharmacy or she can also get one over the counter.

## 2024-11-19 ENCOUNTER — OFFICE VISIT (OUTPATIENT)
Dept: OBSTETRICS AND GYNECOLOGY | Facility: CLINIC | Age: 44
End: 2024-11-19
Attending: OBSTETRICS & GYNECOLOGY
Payer: COMMERCIAL

## 2024-11-19 ENCOUNTER — HOSPITAL ENCOUNTER (OUTPATIENT)
Dept: RADIOLOGY | Facility: HOSPITAL | Age: 44
Discharge: HOME | End: 2024-11-19
Attending: OBSTETRICS & GYNECOLOGY
Payer: COMMERCIAL

## 2024-11-19 VITALS
WEIGHT: 212 LBS | SYSTOLIC BLOOD PRESSURE: 166 MMHG | HEART RATE: 75 BPM | DIASTOLIC BLOOD PRESSURE: 78 MMHG | BODY MASS INDEX: 36.19 KG/M2 | HEIGHT: 64 IN

## 2024-11-19 DIAGNOSIS — N92.1 MENORRHAGIA WITH IRREGULAR CYCLE: Primary | ICD-10-CM

## 2024-11-19 DIAGNOSIS — N92.0 MENORRHAGIA WITH REGULAR CYCLE: ICD-10-CM

## 2024-11-19 PROCEDURE — 4010F ACE/ARB THERAPY RXD/TAKEN: CPT | Mod: ,,, | Performed by: OBSTETRICS & GYNECOLOGY

## 2024-11-19 PROCEDURE — 3078F DIAST BP <80 MM HG: CPT | Mod: ,,, | Performed by: OBSTETRICS & GYNECOLOGY

## 2024-11-19 PROCEDURE — 1159F MED LIST DOCD IN RCRD: CPT | Mod: ,,, | Performed by: OBSTETRICS & GYNECOLOGY

## 2024-11-19 PROCEDURE — 1160F RVW MEDS BY RX/DR IN RCRD: CPT | Mod: ,,, | Performed by: OBSTETRICS & GYNECOLOGY

## 2024-11-19 PROCEDURE — 99214 OFFICE O/P EST MOD 30 MIN: CPT | Mod: PBBFAC,25 | Performed by: OBSTETRICS & GYNECOLOGY

## 2024-11-19 PROCEDURE — 99213 OFFICE O/P EST LOW 20 MIN: CPT | Mod: S$PBB,,, | Performed by: OBSTETRICS & GYNECOLOGY

## 2024-11-19 PROCEDURE — 99999 PR PBB SHADOW E&M-EST. PATIENT-LVL IV: CPT | Mod: PBBFAC,,, | Performed by: OBSTETRICS & GYNECOLOGY

## 2024-11-19 PROCEDURE — 3077F SYST BP >= 140 MM HG: CPT | Mod: ,,, | Performed by: OBSTETRICS & GYNECOLOGY

## 2024-11-19 PROCEDURE — 76830 TRANSVAGINAL US NON-OB: CPT | Mod: 26,,, | Performed by: RADIOLOGY

## 2024-11-19 PROCEDURE — 76856 US EXAM PELVIC COMPLETE: CPT | Mod: 26,,, | Performed by: RADIOLOGY

## 2024-11-19 PROCEDURE — 3008F BODY MASS INDEX DOCD: CPT | Mod: ,,, | Performed by: OBSTETRICS & GYNECOLOGY

## 2024-11-19 PROCEDURE — 76830 TRANSVAGINAL US NON-OB: CPT | Mod: TC

## 2024-11-19 RX ORDER — NORETHINDRONE 5 MG/1
5 TABLET ORAL 2 TIMES DAILY
Qty: 60 TABLET | Refills: 3 | Status: SHIPPED | OUTPATIENT
Start: 2024-11-19 | End: 2025-03-19

## 2024-11-19 NOTE — PROGRESS NOTES
"Return Gyn Office Visit    Assessment/Plan  Problem List Items Addressed This Visit    None  Visit Diagnoses       Menorrhagia with irregular cycle    -  Primary    Relevant Medications    norethindrone (AYGESTIN) 5 mg Tab    miSOPROStoL (CYTOTEC) 200 MCG Tab    Other Relevant Orders    Case Request Operating Room: XI ROBOTIC HYSTERECTOMY,WITH SALPINGECTOMY, CYSTOSCOPY (Completed)          Discussed medical management such as Aygestin, Mirena IUD, endometrial ablation, and hysterectomy. She desires a hysterectomy.  Wants hysterectomy--TRH BS and cysto.  To call back with date of EMB and surgery.  Aygestin until them.    Take 4 weeks off work for surgery.    Addendum: She called back for a surgery date of 1/8/2024. Will schedule. Also will schedule for EMB and pre-op visit prior to surgery. Will give Cytotec to take the night prior to surgery.      CC:   Chief Complaint   Patient presents with    Follow-up     HPI:  44 y.o. who presents to office for US discussion. Her US shows an enlarged uterus and a small 1.7 cm cyst of the right ovary.  Menses have continued to be long and heavy. She was given an Rx for Lysteda after her last visit, but this did not help.     FINDINGS:  Uterus:     Size: 12.1 x 3.4 x 4.9 cm     Masses: None     Endometrium: Normal in this pre menopausal patient, measuring 9 mm.     Nabothian cysts seen.     Trace of fluid in the cervical canal.     Right ovary:     Size: 4.4 x 1.7 x 2.5 cm     Appearance: Follicles seen.     Vascular flow: Normal.     Left ovary:     Size: 3.0 x 1.5 x 1.8 cm     Appearance: Follicles seen.     Vascular Flow: Normal.     Free Fluid:     None.     Impression:     No sonographic abnormality.    Review of Systems - The following ROS was otherwise negative, except as noted in the HPI:  constitutional, respiratory, cardiovascular, gastrointestinal, genitourinary    Objective:  BP (!) 166/78   Pulse 75   Ht 5' 4" (1.626 m)   Wt 96.2 kg (212 lb)   LMP 10/20/2024   " BMI 36.39 kg/m²   General: Alert, well appearing, no acute distress  Abdomen: Soft, nontender, nondistended   Pelvic:Deferred.  Extremities: No redness or tenderness, neg Benjamin's sign  Psych: Normal mood and behavior.      Dawood Ma MD

## 2024-11-20 ENCOUNTER — TELEPHONE (OUTPATIENT)
Dept: OBSTETRICS AND GYNECOLOGY | Facility: CLINIC | Age: 44
End: 2024-11-20
Payer: COMMERCIAL

## 2024-11-20 DIAGNOSIS — N92.1 MENORRHAGIA WITH IRREGULAR CYCLE: Primary | ICD-10-CM

## 2024-11-20 NOTE — TELEPHONE ENCOUNTER
Call cannot be completed at this time.       ----- Message from Med Assistant Perez sent at 11/20/2024 12:29 PM CST -----  Who Called: Haleigh eNwman    Caller is requesting assistance/information from provider's office.    Preferred Method of Contact: Phone Call  Patient's Preferred Phone Number on File: [phone] lv if no answer  Best Call Back Number, if different:  Additional Information: wants hysterectomy for jan 8

## 2024-11-20 NOTE — LETTER
November 20, 2024    Haleigh Newman  [address] [place] [address]             [facility]  Mansi Husain LPN      Phone:    Fax:     Dear Ms. Newman:    I received your message about wanting to schedule your hysterectomy for January 8, 2025. I have informed Dr. Ma.    She does need you to do one procedure called an endometrial biopsy beforehand and will also require a pre-op visit. I could not get you on the phone, but I went ahead and made those appointments so that you at least had one. If those times do not work, let us know as soon as possible so that we can get it rescheduled.    I currently have them scheduled on 12/17/24 at 2:30 for the procedure and 1/7/25 at 9:15 for your pre-op.      If you have any questions or concerns, please don't hesitate to call.    Sincerely,        Mansi Husain LPN

## 2024-11-21 RX ORDER — FAMOTIDINE 20 MG/1
20 TABLET, FILM COATED ORAL
OUTPATIENT
Start: 2024-11-21

## 2024-11-21 RX ORDER — MISOPROSTOL 200 UG/1
400 TABLET ORAL ONCE
Qty: 2 TABLET | Refills: 0 | Status: SHIPPED | OUTPATIENT
Start: 2024-11-21 | End: 2024-11-21

## 2024-11-21 RX ORDER — CEFAZOLIN SODIUM 2 G/50ML
2 SOLUTION INTRAVENOUS
OUTPATIENT
Start: 2024-11-21

## 2024-11-21 RX ORDER — MUPIROCIN 20 MG/G
OINTMENT TOPICAL
OUTPATIENT
Start: 2024-11-21

## 2024-11-21 RX ORDER — PHENAZOPYRIDINE HYDROCHLORIDE 100 MG/1
200 TABLET, FILM COATED ORAL
OUTPATIENT
Start: 2024-11-21 | End: 2024-11-21

## 2024-11-21 RX ORDER — SODIUM CHLORIDE 9 MG/ML
INJECTION, SOLUTION INTRAVENOUS CONTINUOUS
OUTPATIENT
Start: 2024-11-21

## 2024-11-30 ENCOUNTER — HOSPITAL ENCOUNTER (EMERGENCY)
Facility: HOSPITAL | Age: 44
Discharge: HOME | End: 2024-11-30
Payer: COMMERCIAL

## 2024-11-30 VITALS
HEART RATE: 73 BPM | DIASTOLIC BLOOD PRESSURE: 97 MMHG | RESPIRATION RATE: 11 BRPM | SYSTOLIC BLOOD PRESSURE: 156 MMHG | TEMPERATURE: 98 F | OXYGEN SATURATION: 99 % | HEIGHT: 64 IN | WEIGHT: 215 LBS | BODY MASS INDEX: 36.7 KG/M2

## 2024-11-30 DIAGNOSIS — I10 HYPERTENSION: ICD-10-CM

## 2024-11-30 DIAGNOSIS — I10 UNCONTROLLED HYPERTENSION: Primary | ICD-10-CM

## 2024-11-30 LAB
AMPHET UR QL SCN: NEGATIVE
ANION GAP SERPL CALCULATED.3IONS-SCNC: 8 MMOL/L (ref 7–16)
BARBITURATES UR QL SCN: NEGATIVE
BASOPHILS # BLD AUTO: 0.03 K/UL (ref 0–0.2)
BASOPHILS NFR BLD AUTO: 0.4 % (ref 0–1)
BENZODIAZ METAB UR QL SCN: NEGATIVE
BILIRUB UR QL STRIP: NEGATIVE
BUN SERPL-MCNC: 10 MG/DL (ref 7–19)
BUN/CREAT SERPL: 13 (ref 6–20)
CALCIUM SERPL-MCNC: 8.4 MG/DL (ref 8.4–10.2)
CANNABINOIDS UR QL SCN: NEGATIVE
CHLORIDE SERPL-SCNC: 109 MMOL/L (ref 98–107)
CLARITY UR: CLEAR
CO2 SERPL-SCNC: 23 MMOL/L (ref 22–29)
COCAINE UR QL SCN: NEGATIVE
COLOR UR: YELLOW
CREAT SERPL-MCNC: 0.75 MG/DL (ref 0.55–1.02)
DIFFERENTIAL METHOD BLD: ABNORMAL
EGFR (NO RACE VARIABLE) (RUSH/TITUS): 101 ML/MIN/1.73M2
EOSINOPHIL # BLD AUTO: 0.17 K/UL (ref 0–0.5)
EOSINOPHIL NFR BLD AUTO: 2.4 % (ref 1–4)
ERYTHROCYTE [DISTWIDTH] IN BLOOD BY AUTOMATED COUNT: 14 % (ref 11.5–14.5)
GLUCOSE SERPL-MCNC: 82 MG/DL (ref 74–100)
GLUCOSE UR STRIP-MCNC: NORMAL MG/DL
HCT VFR BLD AUTO: 34.3 % (ref 38–47)
HGB BLD-MCNC: 11 G/DL (ref 12–16)
IMM GRANULOCYTES # BLD AUTO: 0.02 K/UL (ref 0–0.04)
IMM GRANULOCYTES NFR BLD: 0.3 % (ref 0–0.4)
KETONES UR STRIP-SCNC: NEGATIVE MG/DL
LEUKOCYTE ESTERASE UR QL STRIP: NEGATIVE
LYMPHOCYTES # BLD AUTO: 2.07 K/UL (ref 1–4.8)
LYMPHOCYTES NFR BLD AUTO: 29.6 % (ref 27–41)
MCH RBC QN AUTO: 29.6 PG (ref 27–31)
MCHC RBC AUTO-ENTMCNC: 32.1 G/DL (ref 32–36)
MCV RBC AUTO: 92.5 FL (ref 80–96)
MONOCYTES # BLD AUTO: 0.46 K/UL (ref 0–0.8)
MONOCYTES NFR BLD AUTO: 6.6 % (ref 2–6)
MPC BLD CALC-MCNC: 12.4 FL (ref 9.4–12.4)
NEUTROPHILS # BLD AUTO: 4.24 K/UL (ref 1.8–7.7)
NEUTROPHILS NFR BLD AUTO: 60.7 % (ref 53–65)
NITRITE UR QL STRIP: NEGATIVE
NRBC # BLD AUTO: 0 X10E3/UL
NRBC, AUTO (.00): 0 %
OPIATES UR QL SCN: NEGATIVE
PCP UR QL SCN: NEGATIVE
PH UR STRIP: 6 PH UNITS
PLATELET # BLD AUTO: 215 K/UL (ref 150–400)
POTASSIUM SERPL-SCNC: 3.5 MMOL/L (ref 3.5–5.1)
PROT UR QL STRIP: 10
RBC # BLD AUTO: 3.71 M/UL (ref 4.2–5.4)
RBC # UR STRIP: NEGATIVE /UL
SODIUM SERPL-SCNC: 136 MMOL/L (ref 136–145)
SP GR UR STRIP: 1.03
UROBILINOGEN UR STRIP-ACNC: 4 MG/DL
WBC # BLD AUTO: 6.99 K/UL (ref 4.5–11)

## 2024-11-30 PROCEDURE — 36415 COLL VENOUS BLD VENIPUNCTURE: CPT | Performed by: NURSE PRACTITIONER

## 2024-11-30 PROCEDURE — 80307 DRUG TEST PRSMV CHEM ANLYZR: CPT | Performed by: NURSE PRACTITIONER

## 2024-11-30 PROCEDURE — 80048 BASIC METABOLIC PNL TOTAL CA: CPT | Performed by: NURSE PRACTITIONER

## 2024-11-30 PROCEDURE — 85025 COMPLETE CBC W/AUTO DIFF WBC: CPT | Performed by: NURSE PRACTITIONER

## 2024-11-30 PROCEDURE — 93005 ELECTROCARDIOGRAM TRACING: CPT

## 2024-11-30 PROCEDURE — 81003 URINALYSIS AUTO W/O SCOPE: CPT | Performed by: NURSE PRACTITIONER

## 2024-11-30 PROCEDURE — 99284 EMERGENCY DEPT VISIT MOD MDM: CPT | Mod: 25

## 2024-11-30 PROCEDURE — 93010 ELECTROCARDIOGRAM REPORT: CPT | Mod: ,,, | Performed by: HOSPITALIST

## 2024-11-30 NOTE — ED PROVIDER NOTES
"Encounter Date: 2024       History     Chief Complaint   Patient presents with    Hypertension     Patient presents to the ED with c/o hypertension. Patient presents to the ED with c/o her BP running high for a few weeks. Patient states her vision started getting blurry this morning around 0900      45 y/o AAF presents to the emergency department with c/o elevated blood pressure. She states she has been having intermittent blurred vision for the past few days to weeks. She states that she has also had some fatigue and "no energy". She states she has been taking her blood pressure medication as prescribed but blood pressure is still running high. According to the patient, she has been taking her blood pressure several times a day and it has been in the 160s systolic. She has not followed up with her primary care provider. She denies headache, dizziness, nausea, vomiting, chest pain or shortness of breath. She has had no fever or chills. No recent illness. Has not followed up with her eye doctor. She states she has not eaten today either and request some food and a blanket.     The history is provided by the patient and medical records.     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 car    Depression     Essential (primary) hypertension     Nicotine dependence      Past Surgical History:   Procedure Laterality Date     SECTION       Family History   Problem Relation Name Age of Onset    Hypertension Mother      Thyroid disease Mother      Hypertension Father      Thyroid disease Sister      Cancer Son      Thyroid disease Maternal Aunt      Cancer Paternal Aunt      Hypertension Maternal Grandmother      Cancer Maternal Grandmother      Diabetes Maternal Grandmother      Hypertension Maternal Grandfather      Diabetes Maternal Grandfather      Hypertension Paternal " Grandmother      Cancer Paternal Grandmother      Diabetes Paternal Grandmother      Hypertension Paternal Grandfather      Diabetes Paternal Grandfather       Social History     Tobacco Use    Smoking status: Some Days     Types: Cigarettes     Start date: 10/2007    Smokeless tobacco: Never    Tobacco comments:     Has taken chantix in the past.    Substance Use Topics    Alcohol use: Never    Drug use: Never     Review of Systems   All other systems reviewed and are negative.      Physical Exam     Initial Vitals [11/30/24 1352]   BP Pulse Resp Temp SpO2   (!) 167/104 77 18 98.2 °F (36.8 °C) 100 %      MAP       --         Physical Exam    Constitutional: She appears well-developed and well-nourished. She is cooperative.  Non-toxic appearance.   BMI >30   Eyes: Conjunctivae and EOM are normal. Pupils are equal, round, and reactive to light.   Cardiovascular:  Normal rate, regular rhythm and normal heart sounds.           Pulmonary/Chest: Effort normal and breath sounds normal.   Abdominal: Abdomen is soft. Bowel sounds are normal. There is no abdominal tenderness.     Neurological: She is alert and oriented to person, place, and time. She has normal strength. GCS eye subscore is 4. GCS verbal subscore is 5. GCS motor subscore is 6.   Skin: Skin is warm, dry and intact. Capillary refill takes less than 2 seconds.         Medical Screening Exam   See Full Note    ED Course   Procedures  Labs Reviewed   BASIC METABOLIC PANEL - Abnormal       Result Value    Sodium 136      Potassium 3.5      Chloride 109 (*)     CO2 23      Anion Gap 8      Glucose 82      BUN 10      Creatinine 0.75      BUN/Creatinine Ratio 13      Calcium 8.4      eGFR 101     URINALYSIS, REFLEX TO URINE CULTURE - Abnormal    Color, UA Yellow      Clarity, UA Clear      pH, UA 6.0      Leukocytes, UA Negative      Nitrites, UA Negative      Protein, UA 10 (*)     Glucose, UA Normal      Ketones, UA Negative      Urobilinogen, UA 4 (*)      Bilirubin, UA Negative      Blood, UA Negative      Specific Gravity, UA 1.029     CBC WITH DIFFERENTIAL - Abnormal    WBC 6.99      RBC 3.71 (*)     Hemoglobin 11.0 (*)     Hematocrit 34.3 (*)     MCV 92.5      MCH 29.6      MCHC 32.1      RDW 14.0      Platelet Count 215      MPV 12.4      Neutrophils % 60.7      Lymphocytes % 29.6      Monocytes % 6.6 (*)     Eosinophils % 2.4      Basophils % 0.4      Immature Granulocytes % 0.3      nRBC, Auto 0.0      Neutrophils, Abs 4.24      Lymphocytes, Absolute 2.07      Monocytes, Absolute 0.46      Eosinophils, Absolute 0.17      Basophils, Absolute 0.03      Immature Granulocytes, Absolute 0.02      nRBC, Absolute 0.00      Diff Type Auto     DRUG SCREEN, URINE (BEAKER) - Normal    Barbiturates, Urine Negative      Benzodiazepine, Urine Negative      Opiates, Urine Negative      Phencyclidine, Urine Negative      Amphetamine, Urine Negative      Cannabinoid, Urine Negative      Cocaine, Urine Negative      Narrative:     This screen includes the following classes of drugs at the listed cut-off:    Benzodiazepines 200 ng/ml  Cocaine metabolite 300 ng/ml  Opiates 2000 ng/ml  Barbiturates 200 ng/ml  Amphetamines 500 ng/ml  Marijuana metabs (THC) 50 ng/ml  Phencyclidine (PCP) 25 ng/ml    This is a screening test. If results do not correlate with clinical presentation, then a confirmatory send out test is advised.   CBC W/ AUTO DIFFERENTIAL    Narrative:     The following orders were created for panel order CBC auto differential.  Procedure                               Abnormality         Status                     ---------                               -----------         ------                     CBC with Differential[[id number]]       Abnormal            Final result                 Please view results for these tests on the individual orders.          Imaging Results    None          Medications - No data to display  Medical Decision Making  45 y/o AAF presents to  "the emergency department with c/o elevated blood pressure. She states she has been having intermittent blurred vision for the past few days to weeks. She states that she has also had some fatigue and "no energy". She states she has been taking her blood pressure medication as prescribed but blood pressure is still running high. According to the patient, she has been taking her blood pressure several times a day and it has been in the 160s systolic. She has not followed up with her primary care provider. She denies headache, dizziness, nausea, vomiting, chest pain or shortness of breath. She has had no fever or chills. No recent illness. Has not followed up with her eye doctor. She states she has not eaten today either and request some food and a blanket.     Problems Addressed:  Uncontrolled hypertension:     Details: Work up essentially unremarkable. Is spilling some protein in her urine but not significantly elevated. Counseled on supportive measures. Strict f/u instructions given. Warning s/s discussed and return precautions given; the patient has v/u.      Amount and/or Complexity of Data Reviewed  Labs: ordered.    Risk  OTC drugs.  Prescription drug management.                                      Clinical Impression:   Final diagnoses:  [I10] Hypertension  [I10] Uncontrolled hypertension (Primary)        ED Disposition Condition    Discharge Stable          ED Prescriptions    None       Follow-up Information       Follow up With Specialties Details Why Contact Info    Betina Welsh, DON Family Medicine, Emergency Medicine Schedule an appointment as soon as possible for a visit   [address] [facility] [address]  [phone]               Jeanne Prince FNP  12/01/24 1239    "

## 2024-11-30 NOTE — DISCHARGE INSTRUCTIONS
Continue to take your medications as previously directed. Limit your salt intake. Follow a low fat diet. Get plenty of exercise, 30-45 minutes 5x week. Get plenty of rest. Keep a close check on your blood pressure and keep a log to take with you to your next follow up appointment. Make an appointment to be seen as soon as possible for recheck and continued care and management. Return to the ED for worsening signs and symptoms or otherwise as needed.

## 2024-12-02 LAB
OHS QRS DURATION: 78 MS
OHS QTC CALCULATION: 401 MS

## 2024-12-05 ENCOUNTER — OFFICE VISIT (OUTPATIENT)
Dept: FAMILY MEDICINE | Facility: CLINIC | Age: 44
End: 2024-12-05
Payer: COMMERCIAL

## 2024-12-05 VITALS
BODY MASS INDEX: 36.7 KG/M2 | RESPIRATION RATE: 18 BRPM | WEIGHT: 215 LBS | SYSTOLIC BLOOD PRESSURE: 133 MMHG | HEART RATE: 77 BPM | OXYGEN SATURATION: 99 % | TEMPERATURE: 99 F | DIASTOLIC BLOOD PRESSURE: 85 MMHG | HEIGHT: 64 IN

## 2024-12-05 DIAGNOSIS — M25.561 RIGHT KNEE PAIN, UNSPECIFIED CHRONICITY: Primary | ICD-10-CM

## 2024-12-05 DIAGNOSIS — I10 ESSENTIAL (PRIMARY) HYPERTENSION: ICD-10-CM

## 2024-12-05 DIAGNOSIS — G43.E19 INTRACTABLE CHRONIC MIGRAINE WITH AURA AND WITHOUT STATUS MIGRAINOSUS: ICD-10-CM

## 2024-12-05 RX ORDER — SUMATRIPTAN SUCCINATE 100 MG/1
TABLET ORAL
Qty: 10 TABLET | Refills: 2 | Status: ON HOLD | OUTPATIENT
Start: 2024-12-05

## 2024-12-05 RX ORDER — NAPROXEN 500 MG/1
500 TABLET ORAL 2 TIMES DAILY WITH MEALS
Qty: 14 TABLET | Refills: 0 | Status: SHIPPED | OUTPATIENT
Start: 2024-12-05 | End: 2024-12-12

## 2024-12-11 NOTE — PROGRESS NOTES
Subjective:       Patient ID: Haleigh Newman is a 44 y.o. female.    Chief Complaint: Establish Care, Knee Pain (Right knee pain 8/10. /X2 weeks has treated with cold and warm compresses and elevation but it hasn't changed. ), Hypertension (Reports that she has taken one of her friend clonidine pills, and that it lowered her blood pressure and wants to know if she can have that medication instead. ), and Sore Throat (With a dry cough. Took an at home COVID test and was tested prior but everything came back negative.)    45 yo female here to establish care, get refill for sumatriptan for migraines, and address her knee pain.    Right knee pain has been fairly consistent and worsens from standing for long periods at a time. Pts most recent knee Xray revealed some medial joint space narrowing but not significant OA. Pt states that she recalls the xray being a standing (weight-bearing) xray.    Pt has sleep apnea at baseline. Pt has not used it in last couple months. Pt complains of feeling tired constantly but does not fall asleep while driving. Her work is less than 5 minutes from her house. Pt had elevated BP in office today.    Pt takes losartan 100 mg and metoprolol 50 mg daily for HTN. Pt as been having elevated BP reading and has been taking her friends clonidine occasionally.          Current Outpatient Medications:     losartan (COZAAR) 100 MG tablet, Take 1 tablet (100 mg total) by mouth once daily., Disp: 90 tablet, Rfl: 3    metoprolol succinate (TOPROL-XL) 50 MG 24 hr tablet, Take 1 tablet (50 mg total) by mouth once daily., Disp: 90 tablet, Rfl: 3    norethindrone (AYGESTIN) 5 mg Tab, Take 1 tablet (5 mg total) by mouth 2 (two) times a day., Disp: 60 tablet, Rfl: 3    ferrous sulfate (FEOSOL) 325 mg (65 mg iron) Tab tablet, Take 1 tablet (325 mg total) by mouth daily with breakfast. (Patient not taking: Reported on 12/5/2024), Disp: 30 tablet, Rfl: 6    miSOPROStoL (CYTOTEC) 200 MCG Tab, Take 2 tablets (400  mcg total) by mouth once. Night before procedure. for 1 dose, Disp: 2 tablet, Rfl: 0    sumatriptan (IMITREX) 100 MG tablet, Take 1 tablet at onset of headache.  May repeat in 2 hours.  No more than 2 tabs per 24 hours.  No more than 3 days of the week, Disp: 10 tablet, Rfl: 2    Review of patient's allergies indicates:  No Known Allergies    Past Medical History:   Diagnosis Date    Arthritis     Carboxyhemoglobinemia     says she smells it when she drives her car    Depression     Essential (primary) hypertension     Nicotine dependence        Past Surgical History:   Procedure Laterality Date    BILATERAL TUBAL LIGATION       SECTION         Family History   Problem Relation Name Age of Onset    Hypertension Mother      Thyroid disease Mother      Hypertension Father      Thyroid disease Sister      Cancer Son      Thyroid disease Maternal Aunt      Cancer Paternal Aunt      Hypertension Maternal Grandmother      Cancer Maternal Grandmother      Diabetes Maternal Grandmother      Hypertension Maternal Grandfather      Diabetes Maternal Grandfather      Hypertension Paternal Grandmother      Cancer Paternal Grandmother      Diabetes Paternal Grandmother      Hypertension Paternal Grandfather      Diabetes Paternal Grandfather         Social History     Tobacco Use    Smoking status: Former     Current packs/day: 0.00     Types: Cigarettes     Quit date: 10/2007     Years since quittin.2    Smokeless tobacco: Never    Tobacco comments:     Has taken chantix in the past.    Substance Use Topics    Alcohol use: Never    Drug use: Never       Review of Systems   Constitutional:  Negative for chills and fever.   Respiratory:  Negative for shortness of breath and wheezing.    Cardiovascular:  Negative for chest pain and palpitations.   Gastrointestinal:  Negative for nausea and vomiting.   Neurological:  Negative for headaches (migraines that subside with sumitriptan, headaches don't seem to correlate with  "blood pressure elevations.).           Objective:      Vitals:    12/05/24 1405   BP: 133/85   BP Location: Left arm   Patient Position: Sitting   Pulse: 77   Resp: 18   Temp: 98.5 °F (36.9 °C)   TempSrc: Oral   SpO2: 99%   Weight: 97.5 kg (215 lb)   Height: 5' 4" (1.626 m)     Physical Exam  Vitals and nursing note reviewed.   Constitutional:       General: She is not in acute distress.     Appearance: She is obese. She is not ill-appearing.   HENT:      Head: Normocephalic and atraumatic.   Cardiovascular:      Rate and Rhythm: Normal rate and regular rhythm.      Pulses: Normal pulses.      Heart sounds: Normal heart sounds. No murmur heard.     No friction rub. No gallop.   Pulmonary:      Effort: Pulmonary effort is normal. No respiratory distress.      Breath sounds: Normal breath sounds.   Musculoskeletal:         General: Tenderness (mild, diffuse tenderness about right kne) present. No swelling.   Skin:     General: Skin is warm and dry.   Neurological:      Mental Status: She is alert.   Psychiatric:         Mood and Affect: Mood normal.         Behavior: Behavior normal.         Lab Results   Component Value Date    WBC 8.49 12/22/2024    HGB 12.0 12/22/2024    HCT 39.1 12/22/2024     12/22/2024    CHOL 155 09/09/2024    TRIG 140 09/09/2024    HDL 71 (H) 09/09/2024    ALT 22 12/22/2024    AST 27 12/22/2024     12/22/2024    K 3.5 12/22/2024     (H) 12/22/2024    CREATININE 0.76 12/22/2024    BUN 6 (L) 12/22/2024    CO2 22 12/22/2024    TSH 3.360 11/05/2024    INR 0.93 10/10/2023    HGBA1C 5.1 05/25/2023    MICROALBUR 0.7 05/25/2023      Assessment:       1. Right knee pain, unspecified chronicity    2. Intractable chronic migraine with aura and without status migrainosus    3. Essential (primary) hypertension        Plan:         Problem List Items Addressed This Visit          Cardiac/Vascular    Essential (primary) hypertension     Pt has hx of HTN for which she takes losartan 100 mg " and metoprolol 50 mg.  Pt has been taking her friends clonidine as her BP has been elevated.  Pt has hx of SUDHEER and has not adhered to using her CPAP recently.    Physical exam unremarkable.    Likely secondary HTN to untreated SUDHEER    Pt encouraged to adhere to her CPAP machine and her BP will likely improve as her sleep quality does.  Pt verbalized understanding and states that she will resume her CPAP tonight.  Pt to follow up in 3 months or earlier as needed.            Orthopedic    Knee pain - Primary     Right knee pain for the past 2 weeks. Previous episodes of knee pain.  Has been icing and elevating with little improvement.  No medicines taken.  Been active and standing a lot more frequently.    Diffuse, mild tenderness in right knee.  Previous xray showed mild OA on medial aspect    Likely OA exacerbated by increased activity.    Pt to continue RICE treatments but I will prescribe naproxen 500 mg for.  She can take it up to 2 times a day and must avoid ibuprofen and other NSAIDs at the same time.  Pt to follow up in 3 months or earlier as needed.          Other Visit Diagnoses       Intractable chronic migraine with aura and without status migrainosus        Relevant Medications    sumatriptan (IMITREX) 100 MG tablet              Follow up in about 3 months (around 3/5/2025).    Francisco Schedule, DO     Instructed patient that if symptoms fail to improve or worsen patient should seek immediate medical attention or report to the nearest emergency department. Patient expressed verbal agreement and understanding to this plan of care.

## 2024-12-17 ENCOUNTER — PROCEDURE VISIT (OUTPATIENT)
Dept: OBSTETRICS AND GYNECOLOGY | Facility: CLINIC | Age: 44
End: 2024-12-17
Payer: COMMERCIAL

## 2024-12-17 VITALS
BODY MASS INDEX: 36.7 KG/M2 | HEIGHT: 64 IN | DIASTOLIC BLOOD PRESSURE: 71 MMHG | SYSTOLIC BLOOD PRESSURE: 154 MMHG | WEIGHT: 215 LBS | HEART RATE: 75 BPM

## 2024-12-17 DIAGNOSIS — N92.1 MENORRHAGIA WITH IRREGULAR CYCLE: Primary | ICD-10-CM

## 2024-12-17 DIAGNOSIS — G89.18 POST-OP PAIN: ICD-10-CM

## 2024-12-17 DIAGNOSIS — Z01.818 PRE-OP TESTING: ICD-10-CM

## 2024-12-17 PROCEDURE — 99999PBSHW PR PBB SHADOW TECHNICAL ONLY FILED TO HB: Mod: PBBFAC,,,

## 2024-12-17 PROCEDURE — 88305 TISSUE EXAM BY PATHOLOGIST: CPT | Mod: 26,,, | Performed by: PATHOLOGY

## 2024-12-17 PROCEDURE — 96372 THER/PROPH/DIAG INJ SC/IM: CPT | Mod: PBBFAC | Performed by: OBSTETRICS & GYNECOLOGY

## 2024-12-17 PROCEDURE — 58100 BIOPSY OF UTERUS LINING: CPT | Mod: PBBFAC | Performed by: OBSTETRICS & GYNECOLOGY

## 2024-12-17 PROCEDURE — 88305 TISSUE EXAM BY PATHOLOGIST: CPT | Mod: TC,SUR | Performed by: OBSTETRICS & GYNECOLOGY

## 2024-12-17 RX ORDER — KETOROLAC TROMETHAMINE 30 MG/ML
60 INJECTION, SOLUTION INTRAMUSCULAR; INTRAVENOUS
Status: COMPLETED | OUTPATIENT
Start: 2024-12-17 | End: 2024-12-17

## 2024-12-17 RX ADMIN — KETOROLAC TROMETHAMINE 60 MG: 30 INJECTION, SOLUTION INTRAMUSCULAR; INTRAVENOUS at 04:12

## 2024-12-19 LAB
DHEA SERPL-MCNC: NORMAL
ESTROGEN SERPL-MCNC: NORMAL PG/ML
INSULIN SERPL-ACNC: NORMAL U[IU]/ML
LAB AP CLINICAL INFORMATION: NORMAL
LAB AP GROSS DESCRIPTION: NORMAL
LAB AP LABORATORY NOTES: NORMAL
T3RU NFR SERPL: NORMAL %

## 2024-12-22 ENCOUNTER — HOSPITAL ENCOUNTER (EMERGENCY)
Facility: HOSPITAL | Age: 44
Discharge: HOME | End: 2024-12-22
Payer: COMMERCIAL

## 2024-12-22 VITALS
OXYGEN SATURATION: 96 % | TEMPERATURE: 98 F | HEIGHT: 64 IN | BODY MASS INDEX: 36.54 KG/M2 | RESPIRATION RATE: 18 BRPM | SYSTOLIC BLOOD PRESSURE: 153 MMHG | WEIGHT: 214 LBS | DIASTOLIC BLOOD PRESSURE: 92 MMHG | HEART RATE: 69 BPM

## 2024-12-22 DIAGNOSIS — N93.9 VAGINAL BLEEDING: Primary | ICD-10-CM

## 2024-12-22 LAB
ALBUMIN SERPL BCP-MCNC: 3.8 G/DL (ref 3.5–5)
ALBUMIN/GLOB SERPL: 1 {RATIO}
ALP SERPL-CCNC: 76 U/L (ref 40–150)
ALT SERPL W P-5'-P-CCNC: 22 U/L
ANION GAP SERPL CALCULATED.3IONS-SCNC: 14 MMOL/L (ref 7–16)
AST SERPL W P-5'-P-CCNC: 27 U/L (ref 5–34)
B-HCG UR QL: NEGATIVE
BACTERIA #/AREA URNS HPF: ABNORMAL /HPF
BASOPHILS # BLD AUTO: 0.03 K/UL (ref 0–0.2)
BASOPHILS NFR BLD AUTO: 0.4 % (ref 0–1)
BILIRUB SERPL-MCNC: 0.4 MG/DL
BILIRUB UR QL STRIP: NEGATIVE
BUN SERPL-MCNC: 6 MG/DL (ref 7–19)
BUN/CREAT SERPL: 8 (ref 6–20)
CALCIUM SERPL-MCNC: 9.1 MG/DL (ref 8.4–10.2)
CHLORIDE SERPL-SCNC: 108 MMOL/L (ref 98–107)
CLARITY UR: ABNORMAL
CO2 SERPL-SCNC: 22 MMOL/L (ref 22–29)
COLOR UR: COLORLESS
CREAT SERPL-MCNC: 0.76 MG/DL (ref 0.55–1.02)
CTP QC/QA: YES
DIFFERENTIAL METHOD BLD: ABNORMAL
EGFR (NO RACE VARIABLE) (RUSH/TITUS): 99 ML/MIN/1.73M2
EOSINOPHIL # BLD AUTO: 0.17 K/UL (ref 0–0.5)
EOSINOPHIL NFR BLD AUTO: 2 % (ref 1–4)
ERYTHROCYTE [DISTWIDTH] IN BLOOD BY AUTOMATED COUNT: 13.8 % (ref 11.5–14.5)
GLOBULIN SER-MCNC: 3.9 G/DL (ref 2–4)
GLUCOSE SERPL-MCNC: 72 MG/DL (ref 74–100)
GLUCOSE UR STRIP-MCNC: NORMAL MG/DL
HCT VFR BLD AUTO: 39.1 % (ref 38–47)
HGB BLD-MCNC: 12 G/DL (ref 12–16)
IMM GRANULOCYTES # BLD AUTO: 0.02 K/UL (ref 0–0.04)
IMM GRANULOCYTES NFR BLD: 0.2 % (ref 0–0.4)
KETONES UR STRIP-SCNC: NEGATIVE MG/DL
LEUKOCYTE ESTERASE UR QL STRIP: ABNORMAL
LYMPHOCYTES # BLD AUTO: 2.21 K/UL (ref 1–4.8)
LYMPHOCYTES NFR BLD AUTO: 26 % (ref 27–41)
MCH RBC QN AUTO: 28.3 PG (ref 27–31)
MCHC RBC AUTO-ENTMCNC: 30.7 G/DL (ref 32–36)
MCV RBC AUTO: 92.2 FL (ref 80–96)
MONOCYTES # BLD AUTO: 0.57 K/UL (ref 0–0.8)
MONOCYTES NFR BLD AUTO: 6.7 % (ref 2–6)
MPC BLD CALC-MCNC: 12 FL (ref 9.4–12.4)
MUCOUS, UA: ABNORMAL /LPF
NEUTROPHILS # BLD AUTO: 5.49 K/UL (ref 1.8–7.7)
NEUTROPHILS NFR BLD AUTO: 64.7 % (ref 53–65)
NITRITE UR QL STRIP: NEGATIVE
NRBC # BLD AUTO: 0 X10E3/UL
NRBC, AUTO (.00): 0 %
PH UR STRIP: 5.5 PH UNITS
PLATELET # BLD AUTO: 263 K/UL (ref 150–400)
POTASSIUM SERPL-SCNC: 3.5 MMOL/L (ref 3.5–5.1)
PROT SERPL-MCNC: 7.7 G/DL (ref 6.4–8.3)
PROT UR QL STRIP: 20
RBC # BLD AUTO: 4.24 M/UL (ref 4.2–5.4)
RBC # UR STRIP: ABNORMAL /UL
RBC #/AREA URNS HPF: >182 /HPF
SODIUM SERPL-SCNC: 140 MMOL/L (ref 136–145)
SP GR UR STRIP: 1.01
SQUAMOUS #/AREA URNS LPF: ABNORMAL /HPF
UROBILINOGEN UR STRIP-ACNC: NORMAL MG/DL
WBC # BLD AUTO: 8.49 K/UL (ref 4.5–11)
WBC #/AREA URNS HPF: 4 /HPF

## 2024-12-22 PROCEDURE — 99285 EMERGENCY DEPT VISIT HI MDM: CPT | Mod: 25

## 2024-12-22 PROCEDURE — 80053 COMPREHEN METABOLIC PANEL: CPT | Performed by: NURSE PRACTITIONER

## 2024-12-22 PROCEDURE — 63600175 PHARM REV CODE 636 W HCPCS: Performed by: NURSE PRACTITIONER

## 2024-12-22 PROCEDURE — 96374 THER/PROPH/DIAG INJ IV PUSH: CPT

## 2024-12-22 PROCEDURE — 36415 COLL VENOUS BLD VENIPUNCTURE: CPT | Performed by: NURSE PRACTITIONER

## 2024-12-22 PROCEDURE — 81025 URINE PREGNANCY TEST: CPT | Performed by: NURSE PRACTITIONER

## 2024-12-22 PROCEDURE — 85025 COMPLETE CBC W/AUTO DIFF WBC: CPT | Performed by: NURSE PRACTITIONER

## 2024-12-22 PROCEDURE — 96376 TX/PRO/DX INJ SAME DRUG ADON: CPT

## 2024-12-22 PROCEDURE — 81003 URINALYSIS AUTO W/O SCOPE: CPT | Performed by: NURSE PRACTITIONER

## 2024-12-22 PROCEDURE — 96375 TX/PRO/DX INJ NEW DRUG ADDON: CPT

## 2024-12-22 RX ORDER — MORPHINE SULFATE 4 MG/ML
4 INJECTION, SOLUTION INTRAMUSCULAR; INTRAVENOUS
Status: COMPLETED | OUTPATIENT
Start: 2024-12-22 | End: 2024-12-22

## 2024-12-22 RX ORDER — TRANEXAMIC ACID 650 MG/1
1300 TABLET ORAL 3 TIMES DAILY
Qty: 30 TABLET | Refills: 0 | Status: SHIPPED | OUTPATIENT
Start: 2024-12-22 | End: 2024-12-27

## 2024-12-22 RX ORDER — ONDANSETRON HYDROCHLORIDE 2 MG/ML
4 INJECTION, SOLUTION INTRAVENOUS
Status: COMPLETED | OUTPATIENT
Start: 2024-12-22 | End: 2024-12-22

## 2024-12-22 RX ADMIN — ONDANSETRON 4 MG: 2 INJECTION INTRAMUSCULAR; INTRAVENOUS at 12:12

## 2024-12-22 RX ADMIN — MORPHINE SULFATE 4 MG: 4 INJECTION, SOLUTION INTRAMUSCULAR; INTRAVENOUS at 02:12

## 2024-12-22 RX ADMIN — MORPHINE SULFATE 4 MG: 4 INJECTION, SOLUTION INTRAMUSCULAR; INTRAVENOUS at 12:12

## 2024-12-22 NOTE — ED PROVIDER NOTES
Encounter Date: 2024       History     Chief Complaint   Patient presents with    Vaginal Bleeding     Had biopsy done on Tuesday.  Was spotting until Wednesday night when bleeding became heavy, cramping started Thursday.      Patient is a 43 y/o AAF who presents to ER with c/o heavy vaginal bleeding with clots (soaking maxi pad every 3-4 hours) and lower abd cramping since biopsy on 24 by Dr. Ma. Pt states she is supposed to be scheduling for hysterectomy. Her LNMP was 24. She states the vag bleeding/cramping is worsening since it began 5 days ago.      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 car    Depression     Essential (primary) hypertension     Nicotine dependence      Past Surgical History:   Procedure Laterality Date    BILATERAL TUBAL LIGATION       SECTION       Family History   Problem Relation Name Age of Onset    Hypertension Mother      Thyroid disease Mother      Hypertension Father      Thyroid disease Sister      Cancer Son      Thyroid disease Maternal Aunt      Cancer Paternal Aunt      Hypertension Maternal Grandmother      Cancer Maternal Grandmother      Diabetes Maternal Grandmother      Hypertension Maternal Grandfather      Diabetes Maternal Grandfather      Hypertension Paternal Grandmother      Cancer Paternal Grandmother      Diabetes Paternal Grandmother      Hypertension Paternal Grandfather      Diabetes Paternal Grandfather       Social History     Tobacco Use    Smoking status: Former     Current packs/day: 0.00     Types: Cigarettes     Quit date: 10/2007     Years since quittin.2    Smokeless tobacco: Never    Tobacco comments:     Has taken chantix in the past.    Substance Use Topics    Alcohol use: Never    Drug use: Never     Review of Systems   All other systems reviewed and are negative.      Physical  Exam     Initial Vitals [12/22/24 1039]   BP Pulse Resp Temp SpO2   (!) 158/95 77 16 98.2 °F (36.8 °C) 99 %      MAP       --         Physical Exam    Constitutional: She appears well-developed and well-nourished. She is not diaphoretic. No distress.   HENT:   Head: Normocephalic and atraumatic.   Eyes: Pupils are equal, round, and reactive to light.   Neck: Neck supple.   Cardiovascular:  Normal rate.           Pulmonary/Chest: Breath sounds normal. No respiratory distress.   Abdominal: Abdomen is soft. She exhibits no distension. There is abdominal tenderness in the suprapubic area. There is guarding. There is no rebound.   Musculoskeletal:      Cervical back: Neck supple.     Neurological: She is alert and oriented to person, place, and time. GCS score is 15. GCS eye subscore is 4. GCS verbal subscore is 5. GCS motor subscore is 6.   Skin: Skin is warm and dry.   Psychiatric: She has a normal mood and affect. Her behavior is normal. Judgment and thought content normal.         Medical Screening Exam   See Full Note    ED Course   Procedures  Labs Reviewed   COMPREHENSIVE METABOLIC PANEL - Abnormal       Result Value    Sodium 140      Potassium 3.5      Chloride 108 (*)     CO2 22      Anion Gap 14      Glucose 72 (*)     BUN 6 (*)     Creatinine 0.76      BUN/Creatinine Ratio 8      Calcium 9.1      Total Protein 7.7      Albumin 3.8      Globulin 3.9      A/G Ratio 1.0      Bilirubin, Total 0.4      Alk Phos 76      ALT 22      AST 27      eGFR 99     URINALYSIS, REFLEX TO URINE CULTURE - Abnormal    Color, UA Colorless      Clarity, UA Turbid      pH, UA 5.5      Leukocytes, UA Small (*)     Nitrites, UA Negative      Protein, UA 20 (*)     Glucose, UA Normal      Ketones, UA Negative      Urobilinogen, UA Normal      Bilirubin, UA Negative      Blood, UA Large (*)     Specific Gravity, UA 1.006     CBC WITH DIFFERENTIAL - Abnormal    WBC 8.49      RBC 4.24      Hemoglobin 12.0      Hematocrit 39.1      MCV  92.2      MCH 28.3      MCHC 30.7 (*)     RDW 13.8      Platelet Count 263      MPV 12.0      Neutrophils % 64.7      Lymphocytes % 26.0 (*)     Monocytes % 6.7 (*)     Eosinophils % 2.0      Basophils % 0.4      Immature Granulocytes % 0.2      nRBC, Auto 0.0      Neutrophils, Abs 5.49      Lymphocytes, Absolute 2.21      Monocytes, Absolute 0.57      Eosinophils, Absolute 0.17      Basophils, Absolute 0.03      Immature Granulocytes, Absolute 0.02      nRBC, Absolute 0.00      Diff Type Auto     URINALYSIS, MICROSCOPIC - Abnormal    WBC, UA 4      RBC, UA >182 (*)     Bacteria, UA Occasional (*)     Squamous Epithelial Cells, UA Occasional (*)     Mucous Occasional (*)    CBC W/ AUTO DIFFERENTIAL    Narrative:     The following orders were created for panel order CBC auto differential.  Procedure                               Abnormality         Status                     ---------                               -----------         ------                     CBC with Differential[[id number]]       Abnormal            Final result                 Please view results for these tests on the individual orders.   POCT URINE PREGNANCY    POC Preg Test, Ur Negative       Acceptable Yes            Imaging Results              US Pelvis Comp with Transvag NON-OB (xpd (In process)  Result time 12/22/24 13:15:12   Procedure changed from US Pelvis Complete Non OB                    Medications   morphine injection 4 mg (has no administration in time range)   morphine injection 4 mg (4 mg Intravenous Given 12/22/24 1218)   ondansetron injection 4 mg (4 mg Intravenous Given 12/22/24 1218)     Medical Decision Making  45 y/o BF, vag bleed/lower abd pain since bx 5 days ago, will check lab/u/s and given morphine for pain control    Spoke with Dr. Ma regarding patient and test results. She recommends  mg 2 tabs 3 times a day for up to 5 days and follow up with her in clinic. Everything d/w pt and she is in  agreement & requests d/c, states she is hungry.    Amount and/or Complexity of Data Reviewed  Labs: ordered.  Radiology: ordered.    Risk  Prescription drug management.                                      Clinical Impression:   Final diagnoses:  [N93.9] Vaginal bleeding (Primary)        ED Disposition Condition    Discharge Stable          ED Prescriptions       Medication Sig Dispense Start Date End Date Auth. Provider    tranexamic acid (LYSTEDA) 650 mg tablet Take 2 tablets (1,300 mg total) by mouth 3 (three) times daily. for 5 days 30 tablet 12/22/2024 12/27/2024 Hawa Ma FNP          Follow-up Information       Follow up With Specialties Details Why Contact Info    Dawood Ma MD Obstetrics and Gynecology Call in 1 day  [address] [facility] [address]  [phone]      [facility] - Emergency Department Emergency Medicine  As needed, If symptoms worsen [address] [facility] [id number] [phone]             Hawa Ma FNP  12/22/24 4212

## 2024-12-22 NOTE — DISCHARGE INSTRUCTIONS
Take prescription as directed.  Follow up with Dr. Ma tomorrow.  Return to ER for new or worsening symptoms.

## 2024-12-22 NOTE — Clinical Note
"Haleigh Rodríguezcm Newman was seen and treated in our emergency department on 12/22/2024.  She may return to work on 12/23/2024.       If you have any questions or concerns, please don't hesitate to call.      Irene FREEMAN    "

## 2024-12-23 ENCOUNTER — PATIENT MESSAGE (OUTPATIENT)
Dept: OBSTETRICS AND GYNECOLOGY | Facility: CLINIC | Age: 44
End: 2024-12-23
Payer: COMMERCIAL

## 2025-01-01 NOTE — ASSESSMENT & PLAN NOTE
Pt has hx of HTN for which she takes losartan 100 mg and metoprolol 50 mg.  Pt has been taking her friends clonidine as her BP has been elevated.  Pt has hx of SUDHEER and has not adhered to using her CPAP recently.    Physical exam unremarkable.    Likely secondary HTN to untreated SUDHEER    Pt encouraged to adhere to her CPAP machine and her BP will likely improve as her sleep quality does.  Pt verbalized understanding and states that she will resume her CPAP tonight.  Pt to follow up in 3 months or earlier as needed.

## 2025-01-01 NOTE — ASSESSMENT & PLAN NOTE
Right knee pain for the past 2 weeks. Previous episodes of knee pain.  Has been icing and elevating with little improvement.  No medicines taken.  Been active and standing a lot more frequently.    Diffuse, mild tenderness in right knee.  Previous xray showed mild OA on medial aspect    Likely OA exacerbated by increased activity.    Pt to continue RICE treatments but I will prescribe naproxen 500 mg for.  She can take it up to 2 times a day and must avoid ibuprofen and other NSAIDs at the same time.  Pt to follow up in 3 months or earlier as needed.

## 2025-01-03 DIAGNOSIS — Z01.818 PREOP TESTING: Primary | ICD-10-CM

## 2025-01-07 ENCOUNTER — OFFICE VISIT (OUTPATIENT)
Dept: OBSTETRICS AND GYNECOLOGY | Facility: CLINIC | Age: 45
End: 2025-01-07
Payer: COMMERCIAL

## 2025-01-07 VITALS
WEIGHT: 221 LBS | HEIGHT: 64 IN | HEART RATE: 87 BPM | SYSTOLIC BLOOD PRESSURE: 189 MMHG | DIASTOLIC BLOOD PRESSURE: 86 MMHG | BODY MASS INDEX: 37.73 KG/M2

## 2025-01-07 DIAGNOSIS — N92.1 MENORRHAGIA WITH IRREGULAR CYCLE: ICD-10-CM

## 2025-01-07 DIAGNOSIS — D50.0 IRON DEFICIENCY ANEMIA DUE TO CHRONIC BLOOD LOSS: Primary | ICD-10-CM

## 2025-01-07 PROCEDURE — 99214 OFFICE O/P EST MOD 30 MIN: CPT | Mod: S$PBB,,, | Performed by: OBSTETRICS & GYNECOLOGY

## 2025-01-07 PROCEDURE — 1159F MED LIST DOCD IN RCRD: CPT | Mod: ,,, | Performed by: OBSTETRICS & GYNECOLOGY

## 2025-01-07 PROCEDURE — 3077F SYST BP >= 140 MM HG: CPT | Mod: ,,, | Performed by: OBSTETRICS & GYNECOLOGY

## 2025-01-07 PROCEDURE — 1160F RVW MEDS BY RX/DR IN RCRD: CPT | Mod: ,,, | Performed by: OBSTETRICS & GYNECOLOGY

## 2025-01-07 PROCEDURE — 99213 OFFICE O/P EST LOW 20 MIN: CPT | Mod: PBBFAC | Performed by: OBSTETRICS & GYNECOLOGY

## 2025-01-07 PROCEDURE — 3008F BODY MASS INDEX DOCD: CPT | Mod: ,,, | Performed by: OBSTETRICS & GYNECOLOGY

## 2025-01-07 PROCEDURE — 99999 PR PBB SHADOW E&M-EST. PATIENT-LVL III: CPT | Mod: PBBFAC,,, | Performed by: OBSTETRICS & GYNECOLOGY

## 2025-01-07 PROCEDURE — 3079F DIAST BP 80-89 MM HG: CPT | Mod: ,,, | Performed by: OBSTETRICS & GYNECOLOGY

## 2025-01-07 NOTE — PROGRESS NOTES
Gynecology History and Physical    Assessment/Plan:   Problem List Items Addressed This Visit    None  Visit Diagnoses       Iron deficiency anemia due to chronic blood loss    -  Primary    Menorrhagia with irregular cycle              Discussed medical management such as Aygestin, Mirena IUD, endometrial ablation, and hysterectomy. She desires a hysterectomy.     Plan for a total robotic hysterectomy with bilateral salpingectomy and cystoscopy on 1/8/2025. Discussed NPO status and arrival time. Continue blood pressure medications the day of surgery.    Discussed that there is a chance that it may need to be converted to a total abdominal hysterectomy.   Discussed the benefits of resolution of menses and pain associated with menses. Discussed risks of bleeding, infection, damage to adjacent organs, risks of anesthesia. Discussed the risk of bladder, ureter, or bowel injury. Discussed that if a bladder injury occurs, then she would need a Quintero catheter for 7-10 days. Discussed that if a ureteral injury occurs, then urology would need to manage which could require a stent, nephrostomy tube placement, and/or reimplantation procedure. Discussed if bowel injury occurs, then general anesthesia would be consulted and may need bowel resection or colostomy. She verbalized her understanding. She agrees to proceed. All questions answered. Consents reviewed and signed.     Arrival time discussed.   Plan to stay overnight after surgery.  RTC for postop visit in 1 week.     > 30 minutes spent on review of previous records, direct face to face counseling, and physical exam.     CC:   Chief Complaint   Patient presents with    Pre-op Exam     HPI: Haleigh Newman is a 44 y.o. female who presents with complaints of menses that are heavy and last for weeks. She also has iron deficiency anemia exacerbated by acute blood loss. She desires definitive surgical management.   She has tried TXA without relief. She is getting some relief with  Aygestin. However, she desires a hysterectomy.   She desires to stay the night after the surgery.    2024 US shows:    FINDINGS:  Uterus:     Size: 12.1 x 3.4 x 4.9 cm     Masses: None     Endometrium: Normal in this pre menopausal patient, measuring 9 mm.     Nabothian cysts seen.     Trace of fluid in the cervical canal.     Right ovary:     Size: 4.4 x 1.7 x 2.5 cm     Appearance: Follicles seen.     Vascular flow: Normal.     Left ovary:     Size: 3.0 x 1.5 x 1.8 cm     Appearance: Follicles seen.     Vascular Flow: Normal.     Free Fluid:     None.     Impression:     No sonographic abnormality.    2024 PAP was negative and HPV negative.    2024 endometrial biopsy showed weakly secretory endometrium with decidualized stroma suggestive of pill effect. Endometrial polyp fragments. Acute inflammation.        Review of Systems: The following ROS was otherwise negative, except as noted in the HPI:  constitutional, HEENT, respiratory, cardiovascular, gastrointestinal, genitourinary, skin, musculoskeletal, neurological, psych    Gynecologic History:   No history of abnormal pap smears  No history of of STIs  Menstrual history:       Obstetrical History:  OB History          3    Para   3    Term   1       2    AB        Living   3         SAB        IAB        Ectopic        Multiple        Live Births   3                 Past Medical History:   Past Medical History:   Diagnosis Date    Arthritis     Carboxyhemoglobinemia     says she smells it when she drives her car    Depression     Essential (primary) hypertension     Nicotine dependence        Medications:  Medication List with Changes/Refills   Current Medications    FERROUS SULFATE (FEOSOL) 325 MG (65 MG IRON) TAB TABLET    Take 1 tablet (325 mg total) by mouth daily with breakfast.    LOSARTAN (COZAAR) 100 MG TABLET    Take 1 tablet (100 mg total) by mouth once daily.    METOPROLOL SUCCINATE (TOPROL-XL) 50 MG 24 HR TABLET    Take  "1 tablet (50 mg total) by mouth once daily.    MISOPROSTOL (CYTOTEC) 200 MCG TAB    Take 2 tablets (400 mcg total) by mouth once. Night before procedure. for 1 dose    NORETHINDRONE (AYGESTIN) 5 MG TAB    Take 1 tablet (5 mg total) by mouth 2 (two) times a day.    SUMATRIPTAN (IMITREX) 100 MG TABLET    Take 1 tablet at onset of headache.  May repeat in 2 hours.  No more than 2 tabs per 24 hours.  No more than 3 days of the week       Allergies:  Patient has no known allergies.    Surgical History:  Past Surgical History:   Procedure Laterality Date    BILATERAL TUBAL LIGATION       SECTION         Family History:  Family History   Problem Relation Name Age of Onset    Hypertension Mother      Thyroid disease Mother      Hypertension Father      Thyroid disease Sister      Cancer Son      Thyroid disease Maternal Aunt      Cancer Paternal Aunt      Hypertension Maternal Grandmother      Cancer Maternal Grandmother      Diabetes Maternal Grandmother      Hypertension Maternal Grandfather      Diabetes Maternal Grandfather      Hypertension Paternal Grandmother      Cancer Paternal Grandmother      Diabetes Paternal Grandmother      Hypertension Paternal Grandfather      Diabetes Paternal Grandfather         Social History:  Social History     Substance and Sexual Activity   Alcohol Use Never     Social History     Substance and Sexual Activity   Drug Use Never     Social History     Tobacco Use   Smoking Status Former    Current packs/day: 0.00    Types: Cigarettes    Quit date: 10/2007    Years since quittin.2   Smokeless Tobacco Never   Tobacco Comments    Has taken chantix in the past.        Physical Exam:  BP (!) 189/86   Pulse 87   Ht 5' 4" (1.626 m)   Wt 100.2 kg (221 lb)   LMP 2024 (Exact Date)   BMI 37.93 kg/m²     General: Alert, well appearing, no acute distress  Head: Normocephalic, atraumatic  Lungs: Unlabored respirations. Clear to auscultation bilaterally.  Cardiovascular: " Regular rate and rhythm.   Abdomen: Soft, nontender, nondistended   Pelvic:  Deferred.  Rectovaginal: deferred  Extremities: No redness or tenderness  Skin: Well perfused, normal coloration and turgor, no lesions or rashes visualized  Neuro: Alert, oriented, normal speech, no focal deficits, moves extremities appropriately  Psych: Normal mood and behavior.    Labs:  No visits with results within 1 Day(s) from this visit.   Latest known visit with results is:   Admission on 12/22/2024, Discharged on 12/22/2024   Component Date Value    Sodium 12/22/2024 140     Potassium 12/22/2024 3.5     Chloride 12/22/2024 108 (H)     CO2 12/22/2024 22     Anion Gap 12/22/2024 14     Glucose 12/22/2024 72 (L)     BUN 12/22/2024 6 (L)     Creatinine 12/22/2024 0.76     BUN/Creatinine Ratio 12/22/2024 8     Calcium 12/22/2024 9.1     Total Protein 12/22/2024 7.7     Albumin 12/22/2024 3.8     Globulin 12/22/2024 3.9     A/G Ratio 12/22/2024 1.0     Bilirubin, Total 12/22/2024 0.4     Alk Phos 12/22/2024 76     ALT 12/22/2024 22     AST 12/22/2024 27     eGFR 12/22/2024 99     Color, UA 12/22/2024 Colorless     Clarity, UA 12/22/2024 Turbid     pH, UA 12/22/2024 5.5     Leukocytes, UA 12/22/2024 Small (A)     Nitrites, UA 12/22/2024 Negative     Protein, UA 12/22/2024 20 (A)     Glucose, UA 12/22/2024 Normal     Ketones, UA 12/22/2024 Negative     Urobilinogen, UA 12/22/2024 Normal     Bilirubin, UA 12/22/2024 Negative     Blood, UA 12/22/2024 Large (A)     Specific [place], UA 12/22/2024 1.006     POC Preg Test, Ur 12/22/2024 Negative      Acceptab* 12/22/2024 Yes     WBC 12/22/2024 8.49     RBC 12/22/2024 4.24     Hemoglobin 12/22/2024 12.0     Hematocrit 12/22/2024 39.1     MCV 12/22/2024 92.2     MCH 12/22/2024 28.3     MCHC 12/22/2024 30.7 (L)     RDW 12/22/2024 13.8     Platelet Count 12/22/2024 263     MPV 12/22/2024 12.0     Neutrophils % 12/22/2024 64.7     Lymphocytes % 12/22/2024 26.0 (L)     Monocytes %  12/22/2024 6.7 (H)     Eosinophils % 12/22/2024 2.0     Basophils % 12/22/2024 0.4     Immature Granulocytes % 12/22/2024 0.2     nRBC, Auto 12/22/2024 0.0     Neutrophils, Abs 12/22/2024 5.49     Lymphocytes, Absolute 12/22/2024 2.21     Monocytes, Absolute 12/22/2024 0.57     Eosinophils, Absolute 12/22/2024 0.17     Basophils, Absolute 12/22/2024 0.03     Immature Granulocytes, A* 12/22/2024 0.02     nRBC, Absolute 12/22/2024 0.00     Diff Type 12/22/2024 Auto     WBC, UA 12/22/2024 4     RBC, UA 12/22/2024 >182 (H)     Bacteria, UA 12/22/2024 Occasional (A)     Squamous Epithelial Cell* 12/22/2024 Occasional (A)     Mucous 12/22/2024 Occasional (A)        Dawood Ma MD

## 2025-01-07 NOTE — PROCEDURES
Endometrial biopsy    Date/Time: 12/17/2024 2:30 PM    Performed by: Dawood Ma MD  Authorized by: Dawood Ma MD    Consent:     Prior to procedure the appropriate consent was completed and verified      Consent given by:  Patient    Patient questions answered: yes      Patient agrees, verbalizes understanding, and wants to proceed: yes      Educational handouts given: yes      Instructions and paperwork completed: yes    Indication:     Indications: Menorrhagia    Pre-procedure:     Pre-procedure timeout performed: yes    Procedure:     Procedure: endometrial biopsy with Pipelle      Cervix cleaned and prepped: yes      A paracervical block was performed: no      An intracervical block was performed: no      The cervix was dilated using cervical dilators: no      Use of single-tooth tenaculum: yes (allis clamp)      Uterus sounded: yes      Uterus sound depth (cm):  8    Specimen collected: specimen collected and sent to pathology      Patient tolerated procedure well with no complications: yes    Comments:     Procedure comments:  Endometrial Biopsy Procedure Note    Indication: menorrhagia  EBL: 2mL  Complications: None  Urine pregnancy test:  neg    The risks of the procedure were discussed with the patient.  She was aware these risks include, but are not limited to bleeding, infection and damage to surrounding tissue.  She elected to proceed with the endometrial biopsy.  Written consent was obtained.    She was placed in the dorsal lithotomy position.  A speculum was placed in the vagina, and the cervix was cleansed with betadine. A allis clamp was placed on the anterior lip of the cervix. The cervix was not dilated. An endometrial pipelle was advanced to the fundus and a sample was gently collected. Specimen was adequate after 3+ attempt(s).  The allis clamp was removed and adequate hemostasis was achieved.    She was instructed to take OTC motrin and/or tylenol for pain control.    The pt was  instructed to follow-up in 2 weeks to discuss the results of the biopsy.

## 2025-01-07 NOTE — H&P (VIEW-ONLY)
Gynecology History and Physical    Assessment/Plan:   Problem List Items Addressed This Visit    None  Visit Diagnoses       Iron deficiency anemia due to chronic blood loss    -  Primary    Menorrhagia with irregular cycle              Discussed medical management such as Aygestin, Mirena IUD, endometrial ablation, and hysterectomy. She desires a hysterectomy.     Plan for a total robotic hysterectomy with bilateral salpingectomy and cystoscopy on 1/8/2025. Discussed NPO status and arrival time. Continue blood pressure medications the day of surgery.    Discussed that there is a chance that it may need to be converted to a total abdominal hysterectomy.   Discussed the benefits of resolution of menses and pain associated with menses. Discussed risks of bleeding, infection, damage to adjacent organs, risks of anesthesia. Discussed the risk of bladder, ureter, or bowel injury. Discussed that if a bladder injury occurs, then she would need a Quintero catheter for 7-10 days. Discussed that if a ureteral injury occurs, then urology would need to manage which could require a stent, nephrostomy tube placement, and/or reimplantation procedure. Discussed if bowel injury occurs, then general anesthesia would be consulted and may need bowel resection or colostomy. She verbalized her understanding. She agrees to proceed. All questions answered. Consents reviewed and signed.     Arrival time discussed.   Plan to stay overnight after surgery.  RTC for postop visit in 1 week.     > 30 minutes spent on review of previous records, direct face to face counseling, and physical exam.     CC:   Chief Complaint   Patient presents with    Pre-op Exam     HPI: Haleigh Newman is a 44 y.o. female who presents with complaints of menses that are heavy and last for weeks. She also has iron deficiency anemia exacerbated by acute blood loss. She desires definitive surgical management.   She has tried TXA without relief. She is getting some relief with  Aygestin. However, she desires a hysterectomy.   She desires to stay the night after the surgery.    2024 US shows:    FINDINGS:  Uterus:     Size: 12.1 x 3.4 x 4.9 cm     Masses: None     Endometrium: Normal in this pre menopausal patient, measuring 9 mm.     Nabothian cysts seen.     Trace of fluid in the cervical canal.     Right ovary:     Size: 4.4 x 1.7 x 2.5 cm     Appearance: Follicles seen.     Vascular flow: Normal.     Left ovary:     Size: 3.0 x 1.5 x 1.8 cm     Appearance: Follicles seen.     Vascular Flow: Normal.     Free Fluid:     None.     Impression:     No sonographic abnormality.    2024 PAP was negative and HPV negative.    2024 endometrial biopsy showed weakly secretory endometrium with decidualized stroma suggestive of pill effect. Endometrial polyp fragments. Acute inflammation.        Review of Systems: The following ROS was otherwise negative, except as noted in the HPI:  constitutional, HEENT, respiratory, cardiovascular, gastrointestinal, genitourinary, skin, musculoskeletal, neurological, psych    Gynecologic History:   No history of abnormal pap smears  No history of of STIs  Menstrual history:       Obstetrical History:  OB History          3    Para   3    Term   1       2    AB        Living   3         SAB        IAB        Ectopic        Multiple        Live Births   3                 Past Medical History:   Past Medical History:   Diagnosis Date    Arthritis     Carboxyhemoglobinemia     says she smells it when she drives her car    Depression     Essential (primary) hypertension     Nicotine dependence        Medications:  Medication List with Changes/Refills   Current Medications    FERROUS SULFATE (FEOSOL) 325 MG (65 MG IRON) TAB TABLET    Take 1 tablet (325 mg total) by mouth daily with breakfast.    LOSARTAN (COZAAR) 100 MG TABLET    Take 1 tablet (100 mg total) by mouth once daily.    METOPROLOL SUCCINATE (TOPROL-XL) 50 MG 24 HR TABLET    Take  "1 tablet (50 mg total) by mouth once daily.    MISOPROSTOL (CYTOTEC) 200 MCG TAB    Take 2 tablets (400 mcg total) by mouth once. Night before procedure. for 1 dose    NORETHINDRONE (AYGESTIN) 5 MG TAB    Take 1 tablet (5 mg total) by mouth 2 (two) times a day.    SUMATRIPTAN (IMITREX) 100 MG TABLET    Take 1 tablet at onset of headache.  May repeat in 2 hours.  No more than 2 tabs per 24 hours.  No more than 3 days of the week       Allergies:  Patient has no known allergies.    Surgical History:  Past Surgical History:   Procedure Laterality Date    BILATERAL TUBAL LIGATION       SECTION         Family History:  Family History   Problem Relation Name Age of Onset    Hypertension Mother      Thyroid disease Mother      Hypertension Father      Thyroid disease Sister      Cancer Son      Thyroid disease Maternal Aunt      Cancer Paternal Aunt      Hypertension Maternal Grandmother      Cancer Maternal Grandmother      Diabetes Maternal Grandmother      Hypertension Maternal Grandfather      Diabetes Maternal Grandfather      Hypertension Paternal Grandmother      Cancer Paternal Grandmother      Diabetes Paternal Grandmother      Hypertension Paternal Grandfather      Diabetes Paternal Grandfather         Social History:  Social History     Substance and Sexual Activity   Alcohol Use Never     Social History     Substance and Sexual Activity   Drug Use Never     Social History     Tobacco Use   Smoking Status Former    Current packs/day: 0.00    Types: Cigarettes    Quit date: 10/2007    Years since quittin.2   Smokeless Tobacco Never   Tobacco Comments    Has taken chantix in the past.        Physical Exam:  BP (!) 189/86   Pulse 87   Ht 5' 4" (1.626 m)   Wt 100.2 kg (221 lb)   LMP 2024 (Exact Date)   BMI 37.93 kg/m²     General: Alert, well appearing, no acute distress  Head: Normocephalic, atraumatic  Lungs: Unlabored respirations. Clear to auscultation bilaterally.  Cardiovascular: " Regular rate and rhythm.   Abdomen: Soft, nontender, nondistended   Pelvic:  Deferred.  Rectovaginal: deferred  Extremities: No redness or tenderness  Skin: Well perfused, normal coloration and turgor, no lesions or rashes visualized  Neuro: Alert, oriented, normal speech, no focal deficits, moves extremities appropriately  Psych: Normal mood and behavior.    Labs:  No visits with results within 1 Day(s) from this visit.   Latest known visit with results is:   Admission on 12/22/2024, Discharged on 12/22/2024   Component Date Value    Sodium 12/22/2024 140     Potassium 12/22/2024 3.5     Chloride 12/22/2024 108 (H)     CO2 12/22/2024 22     Anion Gap 12/22/2024 14     Glucose 12/22/2024 72 (L)     BUN 12/22/2024 6 (L)     Creatinine 12/22/2024 0.76     BUN/Creatinine Ratio 12/22/2024 8     Calcium 12/22/2024 9.1     Total Protein 12/22/2024 7.7     Albumin 12/22/2024 3.8     Globulin 12/22/2024 3.9     A/G Ratio 12/22/2024 1.0     Bilirubin, Total 12/22/2024 0.4     Alk Phos 12/22/2024 76     ALT 12/22/2024 22     AST 12/22/2024 27     eGFR 12/22/2024 99     Color, UA 12/22/2024 Colorless     Clarity, UA 12/22/2024 Turbid     pH, UA 12/22/2024 5.5     Leukocytes, UA 12/22/2024 Small (A)     Nitrites, UA 12/22/2024 Negative     Protein, UA 12/22/2024 20 (A)     Glucose, UA 12/22/2024 Normal     Ketones, UA 12/22/2024 Negative     Urobilinogen, UA 12/22/2024 Normal     Bilirubin, UA 12/22/2024 Negative     Blood, UA 12/22/2024 Large (A)     Specific [place], UA 12/22/2024 1.006     POC Preg Test, Ur 12/22/2024 Negative      Acceptab* 12/22/2024 Yes     WBC 12/22/2024 8.49     RBC 12/22/2024 4.24     Hemoglobin 12/22/2024 12.0     Hematocrit 12/22/2024 39.1     MCV 12/22/2024 92.2     MCH 12/22/2024 28.3     MCHC 12/22/2024 30.7 (L)     RDW 12/22/2024 13.8     Platelet Count 12/22/2024 263     MPV 12/22/2024 12.0     Neutrophils % 12/22/2024 64.7     Lymphocytes % 12/22/2024 26.0 (L)     Monocytes %  12/22/2024 6.7 (H)     Eosinophils % 12/22/2024 2.0     Basophils % 12/22/2024 0.4     Immature Granulocytes % 12/22/2024 0.2     nRBC, Auto 12/22/2024 0.0     Neutrophils, Abs 12/22/2024 5.49     Lymphocytes, Absolute 12/22/2024 2.21     Monocytes, Absolute 12/22/2024 0.57     Eosinophils, Absolute 12/22/2024 0.17     Basophils, Absolute 12/22/2024 0.03     Immature Granulocytes, A* 12/22/2024 0.02     nRBC, Absolute 12/22/2024 0.00     Diff Type 12/22/2024 Auto     WBC, UA 12/22/2024 4     RBC, UA 12/22/2024 >182 (H)     Bacteria, UA 12/22/2024 Occasional (A)     Squamous Epithelial Cell* 12/22/2024 Occasional (A)     Mucous 12/22/2024 Occasional (A)        Dawood Ma MD

## 2025-01-07 NOTE — PATIENT INSTRUCTIONS
Take cytotec the night before surgery.  May want to take Gas-X and Miralax to help with gas and bowel movements

## 2025-01-08 ENCOUNTER — ANESTHESIA EVENT (OUTPATIENT)
Dept: SURGERY | Facility: HOSPITAL | Age: 45
End: 2025-01-08
Payer: COMMERCIAL

## 2025-01-08 ENCOUNTER — ANESTHESIA (OUTPATIENT)
Dept: SURGERY | Facility: HOSPITAL | Age: 45
End: 2025-01-08
Payer: COMMERCIAL

## 2025-01-08 ENCOUNTER — HOSPITAL ENCOUNTER (INPATIENT)
Facility: HOSPITAL | Age: 45
LOS: 6 days | Discharge: HOME | DRG: 742 | End: 2025-01-14
Attending: OBSTETRICS & GYNECOLOGY | Admitting: OBSTETRICS & GYNECOLOGY
Payer: COMMERCIAL

## 2025-01-08 DIAGNOSIS — N83.201 RIGHT OVARIAN CYST: Primary | ICD-10-CM

## 2025-01-08 DIAGNOSIS — F41.8 ANXIETY WITH DEPRESSION: ICD-10-CM

## 2025-01-08 DIAGNOSIS — N92.1 MENORRHAGIA WITH IRREGULAR CYCLE: ICD-10-CM

## 2025-01-08 DIAGNOSIS — G47.33 OSA ON CPAP: ICD-10-CM

## 2025-01-08 DIAGNOSIS — Z01.818 PREOPERATIVE CLEARANCE: ICD-10-CM

## 2025-01-08 DIAGNOSIS — Z48.89 ENCOUNTER FOR POSTOPERATIVE CARE: ICD-10-CM

## 2025-01-08 DIAGNOSIS — N73.6 PELVIC ADHESIONS: ICD-10-CM

## 2025-01-08 DIAGNOSIS — G43.909 MIGRAINE WITHOUT STATUS MIGRAINOSUS, NOT INTRACTABLE, UNSPECIFIED MIGRAINE TYPE: ICD-10-CM

## 2025-01-08 DIAGNOSIS — I10 ESSENTIAL (PRIMARY) HYPERTENSION: ICD-10-CM

## 2025-01-08 PROBLEM — D50.0 IRON DEFICIENCY ANEMIA SECONDARY TO BLOOD LOSS (CHRONIC): Status: ACTIVE | Noted: 2025-01-08

## 2025-01-08 PROBLEM — N99.81 INTRAOPERATIVE BLADDER INJURY: Status: ACTIVE | Noted: 2025-01-08

## 2025-01-08 LAB
ANION GAP SERPL CALCULATED.3IONS-SCNC: 12 MMOL/L (ref 7–16)
B-HCG UR QL: NEGATIVE
BASOPHILS # BLD AUTO: 0.04 K/UL (ref 0–0.2)
BASOPHILS NFR BLD AUTO: 0.5 % (ref 0–1)
BUN SERPL-MCNC: 8 MG/DL (ref 7–19)
BUN/CREAT SERPL: 10 (ref 6–20)
CALCIUM SERPL-MCNC: 8.8 MG/DL (ref 8.4–10.2)
CHLORIDE SERPL-SCNC: 105 MMOL/L (ref 98–107)
CO2 SERPL-SCNC: 24 MMOL/L (ref 22–29)
CREAT SERPL-MCNC: 0.8 MG/DL (ref 0.55–1.02)
CTP QC/QA: YES
DIFFERENTIAL METHOD BLD: ABNORMAL
EGFR (NO RACE VARIABLE) (RUSH/TITUS): 93 ML/MIN/1.73M2
EOSINOPHIL # BLD AUTO: 0.18 K/UL (ref 0–0.5)
EOSINOPHIL NFR BLD AUTO: 2.2 % (ref 1–4)
ERYTHROCYTE [DISTWIDTH] IN BLOOD BY AUTOMATED COUNT: 13.2 % (ref 11.5–14.5)
GLUCOSE SERPL-MCNC: 81 MG/DL (ref 74–100)
HCT VFR BLD AUTO: 36 % (ref 38–47)
HGB BLD-MCNC: 11.5 G/DL (ref 12–16)
IMM GRANULOCYTES # BLD AUTO: 0.02 K/UL (ref 0–0.04)
IMM GRANULOCYTES NFR BLD: 0.2 % (ref 0–0.4)
INDIRECT COOMBS: NORMAL
LYMPHOCYTES # BLD AUTO: 1.84 K/UL (ref 1–4.8)
LYMPHOCYTES NFR BLD AUTO: 22.3 % (ref 27–41)
MCH RBC QN AUTO: 29 PG (ref 27–31)
MCHC RBC AUTO-ENTMCNC: 31.9 G/DL (ref 32–36)
MCV RBC AUTO: 90.9 FL (ref 80–96)
MONOCYTES # BLD AUTO: 0.4 K/UL (ref 0–0.8)
MONOCYTES NFR BLD AUTO: 4.8 % (ref 2–6)
MPC BLD CALC-MCNC: 12.6 FL (ref 9.4–12.4)
NEUTROPHILS # BLD AUTO: 5.77 K/UL (ref 1.8–7.7)
NEUTROPHILS NFR BLD AUTO: 70 % (ref 53–65)
NRBC # BLD AUTO: 0 X10E3/UL
NRBC, AUTO (.00): 0 %
PLATELET # BLD AUTO: 255 K/UL (ref 150–400)
POTASSIUM SERPL-SCNC: 3.7 MMOL/L (ref 3.5–5.1)
RBC # BLD AUTO: 3.96 M/UL (ref 4.2–5.4)
RH BLD: NORMAL
SODIUM SERPL-SCNC: 137 MMOL/L (ref 136–145)
SPECIMEN OUTDATE: NORMAL
WBC # BLD AUTO: 8.25 K/UL (ref 4.5–11)

## 2025-01-08 PROCEDURE — 0UT74ZZ RESECTION OF BILATERAL FALLOPIAN TUBES, PERCUTANEOUS ENDOSCOPIC APPROACH: ICD-10-PCS | Performed by: OBSTETRICS & GYNECOLOGY

## 2025-01-08 PROCEDURE — 27000655: Performed by: NURSE ANESTHETIST, CERTIFIED REGISTERED

## 2025-01-08 PROCEDURE — 36000712 HC OR TIME LEV V 1ST 15 MIN: Performed by: OBSTETRICS & GYNECOLOGY

## 2025-01-08 PROCEDURE — C1758 CATHETER, URETERAL: HCPCS | Performed by: OBSTETRICS & GYNECOLOGY

## 2025-01-08 PROCEDURE — 63600175 PHARM REV CODE 636 W HCPCS: Performed by: OBSTETRICS & GYNECOLOGY

## 2025-01-08 PROCEDURE — 27000165 HC TUBE, ETT CUFFED: Performed by: NURSE ANESTHETIST, CERTIFIED REGISTERED

## 2025-01-08 PROCEDURE — 25000003 PHARM REV CODE 250: Performed by: OBSTETRICS & GYNECOLOGY

## 2025-01-08 PROCEDURE — 52005 CYSTO W/URTRL CATHJ: CPT | Mod: 51,,, | Performed by: UROLOGY

## 2025-01-08 PROCEDURE — 88307 TISSUE EXAM BY PATHOLOGIST: CPT | Mod: 26,,, | Performed by: PATHOLOGY

## 2025-01-08 PROCEDURE — 99222 1ST HOSP IP/OBS MODERATE 55: CPT | Mod: ,,, | Performed by: HOSPITALIST

## 2025-01-08 PROCEDURE — 85025 COMPLETE CBC W/AUTO DIFF WBC: CPT | Performed by: OBSTETRICS & GYNECOLOGY

## 2025-01-08 PROCEDURE — 25000003 PHARM REV CODE 250: Performed by: NURSE ANESTHETIST, CERTIFIED REGISTERED

## 2025-01-08 PROCEDURE — 37000009 HC ANESTHESIA EA ADD 15 MINS: Performed by: OBSTETRICS & GYNECOLOGY

## 2025-01-08 PROCEDURE — 81025 URINE PREGNANCY TEST: CPT | Performed by: OBSTETRICS & GYNECOLOGY

## 2025-01-08 PROCEDURE — 25000003 PHARM REV CODE 250: Performed by: ANESTHESIOLOGY

## 2025-01-08 PROCEDURE — 0UT04ZZ RESECTION OF RIGHT OVARY, PERCUTANEOUS ENDOSCOPIC APPROACH: ICD-10-PCS | Performed by: OBSTETRICS & GYNECOLOGY

## 2025-01-08 PROCEDURE — 11000001 HC ACUTE MED/SURG PRIVATE ROOM

## 2025-01-08 PROCEDURE — 63600175 PHARM REV CODE 636 W HCPCS: Performed by: NURSE ANESTHETIST, CERTIFIED REGISTERED

## 2025-01-08 PROCEDURE — 0UN94ZZ RELEASE UTERUS, PERCUTANEOUS ENDOSCOPIC APPROACH: ICD-10-PCS | Performed by: OBSTETRICS & GYNECOLOGY

## 2025-01-08 PROCEDURE — 71000033 HC RECOVERY, INTIAL HOUR: Performed by: OBSTETRICS & GYNECOLOGY

## 2025-01-08 PROCEDURE — BT141ZZ FLUOROSCOPY OF KIDNEYS, URETERS AND BLADDER USING LOW OSMOLAR CONTRAST: ICD-10-PCS | Performed by: UROLOGY

## 2025-01-08 PROCEDURE — 99499 UNLISTED E&M SERVICE: CPT | Mod: ,,, | Performed by: SURGERY

## 2025-01-08 PROCEDURE — 27202344 HC EYESHIELD: Performed by: NURSE ANESTHETIST, CERTIFIED REGISTERED

## 2025-01-08 PROCEDURE — 36000713 HC OR TIME LEV V EA ADD 15 MIN: Performed by: OBSTETRICS & GYNECOLOGY

## 2025-01-08 PROCEDURE — 63600175 PHARM REV CODE 636 W HCPCS: Performed by: ANESTHESIOLOGY

## 2025-01-08 PROCEDURE — 0UT94ZZ RESECTION OF UTERUS, PERCUTANEOUS ENDOSCOPIC APPROACH: ICD-10-PCS | Performed by: OBSTETRICS & GYNECOLOGY

## 2025-01-08 PROCEDURE — 99900035 HC TECH TIME PER 15 MIN (STAT)

## 2025-01-08 PROCEDURE — 58571 TLH W/T/O 250 G OR LESS: CPT | Mod: ,,, | Performed by: OBSTETRICS & GYNECOLOGY

## 2025-01-08 PROCEDURE — 27000689 HC BLADE LARYNGOSCOPE ANY SIZE: Performed by: NURSE ANESTHETIST, CERTIFIED REGISTERED

## 2025-01-08 PROCEDURE — 27200700 HC TUBE, ENDOTRACHEAL NASAL RAE: Performed by: NURSE ANESTHETIST, CERTIFIED REGISTERED

## 2025-01-08 PROCEDURE — 27000509 HC TUBE SALEM SUMP ANY SIZE: Performed by: NURSE ANESTHETIST, CERTIFIED REGISTERED

## 2025-01-08 PROCEDURE — 8E0W4CZ ROBOTIC ASSISTED PROCEDURE OF TRUNK REGION, PERCUTANEOUS ENDOSCOPIC APPROACH: ICD-10-PCS | Performed by: OBSTETRICS & GYNECOLOGY

## 2025-01-08 PROCEDURE — 0TJB8ZZ INSPECTION OF BLADDER, VIA NATURAL OR ARTIFICIAL OPENING ENDOSCOPIC: ICD-10-PCS | Performed by: UROLOGY

## 2025-01-08 PROCEDURE — 27201423 OPTIME MED/SURG SUP & DEVICES STERILE SUPPLY: Performed by: OBSTETRICS & GYNECOLOGY

## 2025-01-08 PROCEDURE — 86850 RBC ANTIBODY SCREEN: CPT | Performed by: OBSTETRICS & GYNECOLOGY

## 2025-01-08 PROCEDURE — 25500020 PHARM REV CODE 255: Performed by: UROLOGY

## 2025-01-08 PROCEDURE — 51860 REPAIR OF BLADDER WOUND: CPT | Mod: ,,, | Performed by: UROLOGY

## 2025-01-08 PROCEDURE — 71000039 HC RECOVERY, EACH ADD'L HOUR: Performed by: OBSTETRICS & GYNECOLOGY

## 2025-01-08 PROCEDURE — 36415 COLL VENOUS BLD VENIPUNCTURE: CPT | Performed by: OBSTETRICS & GYNECOLOGY

## 2025-01-08 PROCEDURE — 25000003 PHARM REV CODE 250: Performed by: UROLOGY

## 2025-01-08 PROCEDURE — 94761 N-INVAS EAR/PLS OXIMETRY MLT: CPT

## 2025-01-08 PROCEDURE — 74420 UROGRAPHY RTRGR +-KUB: CPT | Mod: 26,,, | Performed by: UROLOGY

## 2025-01-08 PROCEDURE — 27000510 HC BLANKET BAIR HUGGER ANY SIZE: Performed by: NURSE ANESTHETIST, CERTIFIED REGISTERED

## 2025-01-08 PROCEDURE — 0TQB0ZZ REPAIR BLADDER, OPEN APPROACH: ICD-10-PCS | Performed by: UROLOGY

## 2025-01-08 PROCEDURE — 80048 BASIC METABOLIC PNL TOTAL CA: CPT | Performed by: OBSTETRICS & GYNECOLOGY

## 2025-01-08 PROCEDURE — 27000221 HC OXYGEN, UP TO 24 HOURS

## 2025-01-08 PROCEDURE — 37000008 HC ANESTHESIA 1ST 15 MINUTES: Performed by: OBSTETRICS & GYNECOLOGY

## 2025-01-08 PROCEDURE — C2628 CATHETER, OCCLUSION: HCPCS | Performed by: OBSTETRICS & GYNECOLOGY

## 2025-01-08 PROCEDURE — 88307 TISSUE EXAM BY PATHOLOGIST: CPT | Mod: TC,SUR | Performed by: OBSTETRICS & GYNECOLOGY

## 2025-01-08 PROCEDURE — 27000716 HC OXISENSOR PROBE, ANY SIZE: Performed by: NURSE ANESTHETIST, CERTIFIED REGISTERED

## 2025-01-08 PROCEDURE — 25000003 PHARM REV CODE 250: Performed by: HOSPITALIST

## 2025-01-08 PROCEDURE — 27000758 HC SPIROMETER

## 2025-01-08 PROCEDURE — 94799 UNLISTED PULMONARY SVC/PX: CPT

## 2025-01-08 RX ORDER — DIPHENHYDRAMINE HCL 25 MG
25 CAPSULE ORAL EVERY 4 HOURS PRN
Status: DISCONTINUED | OUTPATIENT
Start: 2025-01-08 | End: 2025-01-14 | Stop reason: HOSPADM

## 2025-01-08 RX ORDER — LIDOCAINE HYDROCHLORIDE 20 MG/ML
INJECTION, SOLUTION EPIDURAL; INFILTRATION; INTRACAUDAL; PERINEURAL
Status: DISCONTINUED | OUTPATIENT
Start: 2025-01-08 | End: 2025-01-08

## 2025-01-08 RX ORDER — DIPHENHYDRAMINE HYDROCHLORIDE 50 MG/ML
25 INJECTION INTRAMUSCULAR; INTRAVENOUS EVERY 6 HOURS PRN
Status: DISCONTINUED | OUTPATIENT
Start: 2025-01-08 | End: 2025-01-08 | Stop reason: HOSPADM

## 2025-01-08 RX ORDER — DIPHENHYDRAMINE HYDROCHLORIDE 50 MG/ML
25 INJECTION INTRAMUSCULAR; INTRAVENOUS EVERY 4 HOURS PRN
Status: DISCONTINUED | OUTPATIENT
Start: 2025-01-08 | End: 2025-01-14 | Stop reason: HOSPADM

## 2025-01-08 RX ORDER — IBUPROFEN 600 MG/1
600 TABLET ORAL EVERY 6 HOURS
Status: DISCONTINUED | OUTPATIENT
Start: 2025-01-09 | End: 2025-01-13

## 2025-01-08 RX ORDER — ONDANSETRON HYDROCHLORIDE 2 MG/ML
4 INJECTION, SOLUTION INTRAVENOUS DAILY PRN
Status: DISCONTINUED | OUTPATIENT
Start: 2025-01-08 | End: 2025-01-08 | Stop reason: HOSPADM

## 2025-01-08 RX ORDER — IPRATROPIUM BROMIDE AND ALBUTEROL SULFATE 2.5; .5 MG/3ML; MG/3ML
3 SOLUTION RESPIRATORY (INHALATION) ONCE
Status: DISCONTINUED | OUTPATIENT
Start: 2025-01-08 | End: 2025-01-08 | Stop reason: HOSPADM

## 2025-01-08 RX ORDER — FENTANYL CITRATE 50 UG/ML
INJECTION, SOLUTION INTRAMUSCULAR; INTRAVENOUS
Status: DISCONTINUED | OUTPATIENT
Start: 2025-01-08 | End: 2025-01-08

## 2025-01-08 RX ORDER — DIAZEPAM 5 MG/1
10 TABLET ORAL ONCE
Status: COMPLETED | OUTPATIENT
Start: 2025-01-08 | End: 2025-01-08

## 2025-01-08 RX ORDER — MIDAZOLAM HYDROCHLORIDE 1 MG/ML
INJECTION INTRAMUSCULAR; INTRAVENOUS
Status: DISCONTINUED | OUTPATIENT
Start: 2025-01-08 | End: 2025-01-08

## 2025-01-08 RX ORDER — ESCITALOPRAM OXALATE 10 MG/1
10 TABLET ORAL DAILY
COMMUNITY

## 2025-01-08 RX ORDER — PROPOFOL 10 MG/ML
VIAL (ML) INTRAVENOUS
Status: DISCONTINUED | OUTPATIENT
Start: 2025-01-08 | End: 2025-01-08

## 2025-01-08 RX ORDER — ONDANSETRON HYDROCHLORIDE 2 MG/ML
INJECTION, SOLUTION INTRAVENOUS
Status: DISCONTINUED | OUTPATIENT
Start: 2025-01-08 | End: 2025-01-08

## 2025-01-08 RX ORDER — DEXAMETHASONE SODIUM PHOSPHATE 4 MG/ML
INJECTION, SOLUTION INTRA-ARTICULAR; INTRALESIONAL; INTRAMUSCULAR; INTRAVENOUS; SOFT TISSUE
Status: DISCONTINUED | OUTPATIENT
Start: 2025-01-08 | End: 2025-01-08

## 2025-01-08 RX ORDER — LIDOCAINE HYDROCHLORIDE 20 MG/ML
JELLY TOPICAL
Status: DISCONTINUED | OUTPATIENT
Start: 2025-01-08 | End: 2025-01-08 | Stop reason: HOSPADM

## 2025-01-08 RX ORDER — ONDANSETRON 4 MG/1
8 TABLET, ORALLY DISINTEGRATING ORAL EVERY 8 HOURS PRN
Status: DISCONTINUED | OUTPATIENT
Start: 2025-01-08 | End: 2025-01-14 | Stop reason: HOSPADM

## 2025-01-08 RX ORDER — METOPROLOL SUCCINATE 50 MG/1
50 TABLET, EXTENDED RELEASE ORAL DAILY
Status: DISCONTINUED | OUTPATIENT
Start: 2025-01-09 | End: 2025-01-14 | Stop reason: HOSPADM

## 2025-01-08 RX ORDER — LOSARTAN POTASSIUM 100 MG/1
100 TABLET ORAL DAILY
Status: DISCONTINUED | OUTPATIENT
Start: 2025-01-09 | End: 2025-01-14 | Stop reason: HOSPADM

## 2025-01-08 RX ORDER — LOPERAMIDE HYDROCHLORIDE 2 MG/1
2 CAPSULE ORAL CONTINUOUS PRN
Status: DISCONTINUED | OUTPATIENT
Start: 2025-01-08 | End: 2025-01-14 | Stop reason: HOSPADM

## 2025-01-08 RX ORDER — POLYETHYLENE GLYCOL 3350 17 G/17G
17 POWDER, FOR SOLUTION ORAL DAILY
Status: DISCONTINUED | OUTPATIENT
Start: 2025-01-09 | End: 2025-01-14 | Stop reason: HOSPADM

## 2025-01-08 RX ORDER — OXYCODONE AND ACETAMINOPHEN 10; 325 MG/1; MG/1
1 TABLET ORAL EVERY 4 HOURS PRN
Status: DISCONTINUED | OUTPATIENT
Start: 2025-01-08 | End: 2025-01-14 | Stop reason: HOSPADM

## 2025-01-08 RX ORDER — LACTULOSE 10 G/15ML
20 SOLUTION ORAL EVERY 6 HOURS PRN
Status: DISCONTINUED | OUTPATIENT
Start: 2025-01-08 | End: 2025-01-14 | Stop reason: HOSPADM

## 2025-01-08 RX ORDER — DIMENHYDRINATE 50 MG
50 TABLET ORAL ONCE
Status: COMPLETED | OUTPATIENT
Start: 2025-01-08 | End: 2025-01-08

## 2025-01-08 RX ORDER — CEFAZOLIN 2 G/1
2 INJECTION, POWDER, FOR SOLUTION INTRAMUSCULAR; INTRAVENOUS
Status: COMPLETED | OUTPATIENT
Start: 2025-01-08 | End: 2025-01-08

## 2025-01-08 RX ORDER — PHENAZOPYRIDINE HYDROCHLORIDE 100 MG/1
200 TABLET, FILM COATED ORAL
Status: COMPLETED | OUTPATIENT
Start: 2025-01-08 | End: 2025-01-08

## 2025-01-08 RX ORDER — NALOXONE HCL 0.4 MG/ML
VIAL (ML) INJECTION
Status: DISCONTINUED | OUTPATIENT
Start: 2025-01-08 | End: 2025-01-08

## 2025-01-08 RX ORDER — HYDROMORPHONE HYDROCHLORIDE 2 MG/ML
1 INJECTION, SOLUTION INTRAMUSCULAR; INTRAVENOUS; SUBCUTANEOUS EVERY 6 HOURS PRN
Status: DISCONTINUED | OUTPATIENT
Start: 2025-01-08 | End: 2025-01-13

## 2025-01-08 RX ORDER — BISACODYL 10 MG/1
10 SUPPOSITORY RECTAL DAILY PRN
Status: DISCONTINUED | OUTPATIENT
Start: 2025-01-08 | End: 2025-01-14 | Stop reason: HOSPADM

## 2025-01-08 RX ORDER — ACETAMINOPHEN 10 MG/ML
1000 INJECTION, SOLUTION INTRAVENOUS ONCE
Status: COMPLETED | OUTPATIENT
Start: 2025-01-08 | End: 2025-01-08

## 2025-01-08 RX ORDER — SODIUM CHLORIDE, SODIUM LACTATE, POTASSIUM CHLORIDE, CALCIUM CHLORIDE 600; 310; 30; 20 MG/100ML; MG/100ML; MG/100ML; MG/100ML
INJECTION, SOLUTION INTRAVENOUS CONTINUOUS
Status: DISCONTINUED | OUTPATIENT
Start: 2025-01-08 | End: 2025-01-11

## 2025-01-08 RX ORDER — HYDRALAZINE HYDROCHLORIDE 20 MG/ML
10 INJECTION INTRAMUSCULAR; INTRAVENOUS EVERY 6 HOURS PRN
Status: DISCONTINUED | OUTPATIENT
Start: 2025-01-08 | End: 2025-01-14

## 2025-01-08 RX ORDER — BUPIVACAINE HYDROCHLORIDE 2.5 MG/ML
INJECTION, SOLUTION EPIDURAL; INFILTRATION; INTRACAUDAL
Status: DISCONTINUED | OUTPATIENT
Start: 2025-01-08 | End: 2025-01-08 | Stop reason: HOSPADM

## 2025-01-08 RX ORDER — HYDROMORPHONE HYDROCHLORIDE 2 MG/ML
INJECTION, SOLUTION INTRAMUSCULAR; INTRAVENOUS; SUBCUTANEOUS
Status: DISCONTINUED | OUTPATIENT
Start: 2025-01-08 | End: 2025-01-08

## 2025-01-08 RX ORDER — HYDROMORPHONE HYDROCHLORIDE 2 MG/ML
0.5 INJECTION, SOLUTION INTRAMUSCULAR; INTRAVENOUS; SUBCUTANEOUS EVERY 5 MIN PRN
Status: DISCONTINUED | OUTPATIENT
Start: 2025-01-08 | End: 2025-01-08 | Stop reason: HOSPADM

## 2025-01-08 RX ORDER — SODIUM CHLORIDE 9 MG/ML
INJECTION, SOLUTION INTRAVENOUS CONTINUOUS
Status: DISCONTINUED | OUTPATIENT
Start: 2025-01-08 | End: 2025-01-08

## 2025-01-08 RX ORDER — BACITRACIN ZINC 500 [USP'U]/G
OINTMENT TOPICAL
Status: DISCONTINUED | OUTPATIENT
Start: 2025-01-08 | End: 2025-01-08 | Stop reason: HOSPADM

## 2025-01-08 RX ORDER — ROCURONIUM BROMIDE 10 MG/ML
INJECTION, SOLUTION INTRAVENOUS
Status: DISCONTINUED | OUTPATIENT
Start: 2025-01-08 | End: 2025-01-08

## 2025-01-08 RX ORDER — MEPERIDINE HYDROCHLORIDE 25 MG/ML
25 INJECTION INTRAMUSCULAR; INTRAVENOUS; SUBCUTANEOUS EVERY 10 MIN PRN
Status: DISCONTINUED | OUTPATIENT
Start: 2025-01-08 | End: 2025-01-08 | Stop reason: HOSPADM

## 2025-01-08 RX ORDER — CITALOPRAM 10 MG/1
10 TABLET ORAL NIGHTLY
Status: DISCONTINUED | OUTPATIENT
Start: 2025-01-08 | End: 2025-01-14 | Stop reason: HOSPADM

## 2025-01-08 RX ORDER — FAMOTIDINE 20 MG/1
20 TABLET, FILM COATED ORAL 2 TIMES DAILY
Status: DISCONTINUED | OUTPATIENT
Start: 2025-01-08 | End: 2025-01-14 | Stop reason: HOSPADM

## 2025-01-08 RX ORDER — MORPHINE SULFATE 10 MG/ML
4 INJECTION INTRAMUSCULAR; INTRAVENOUS; SUBCUTANEOUS EVERY 5 MIN PRN
Status: DISCONTINUED | OUTPATIENT
Start: 2025-01-08 | End: 2025-01-08 | Stop reason: HOSPADM

## 2025-01-08 RX ORDER — MUPIROCIN 20 MG/G
OINTMENT TOPICAL 2 TIMES DAILY
Status: DISPENSED | OUTPATIENT
Start: 2025-01-08 | End: 2025-01-13

## 2025-01-08 RX ORDER — IOPAMIDOL 612 MG/ML
INJECTION, SOLUTION INTRAVASCULAR
Status: DISCONTINUED | OUTPATIENT
Start: 2025-01-08 | End: 2025-01-08 | Stop reason: HOSPADM

## 2025-01-08 RX ORDER — CEFTRIAXONE 2 G/1
2 INJECTION, POWDER, FOR SOLUTION INTRAMUSCULAR; INTRAVENOUS
Status: COMPLETED | OUTPATIENT
Start: 2025-01-08 | End: 2025-01-10

## 2025-01-08 RX ORDER — KETOROLAC TROMETHAMINE 30 MG/ML
30 INJECTION, SOLUTION INTRAMUSCULAR; INTRAVENOUS EVERY 8 HOURS
Status: COMPLETED | OUTPATIENT
Start: 2025-01-08 | End: 2025-01-09

## 2025-01-08 RX ORDER — MUPIROCIN 20 MG/G
OINTMENT TOPICAL
Status: DISCONTINUED | OUTPATIENT
Start: 2025-01-08 | End: 2025-01-08 | Stop reason: HOSPADM

## 2025-01-08 RX ORDER — LOPERAMIDE HYDROCHLORIDE 2 MG/1
4 CAPSULE ORAL ONCE
Status: DISCONTINUED | OUTPATIENT
Start: 2025-01-08 | End: 2025-01-12

## 2025-01-08 RX ORDER — OXYCODONE AND ACETAMINOPHEN 5; 325 MG/1; MG/1
1 TABLET ORAL EVERY 4 HOURS PRN
Status: DISCONTINUED | OUTPATIENT
Start: 2025-01-08 | End: 2025-01-13

## 2025-01-08 RX ORDER — FAMOTIDINE 20 MG/1
20 TABLET, FILM COATED ORAL
Status: COMPLETED | OUTPATIENT
Start: 2025-01-08 | End: 2025-01-08

## 2025-01-08 RX ORDER — KETOROLAC TROMETHAMINE 30 MG/ML
INJECTION, SOLUTION INTRAMUSCULAR; INTRAVENOUS
Status: DISCONTINUED | OUTPATIENT
Start: 2025-01-08 | End: 2025-01-08

## 2025-01-08 RX ADMIN — SODIUM CHLORIDE: 9 INJECTION, SOLUTION INTRAVENOUS at 12:01

## 2025-01-08 RX ADMIN — CEFAZOLIN 2 G: 2 INJECTION, POWDER, FOR SOLUTION INTRAMUSCULAR; INTRAVENOUS at 11:01

## 2025-01-08 RX ADMIN — SODIUM CHLORIDE: 9 INJECTION, SOLUTION INTRAVENOUS at 07:01

## 2025-01-08 RX ADMIN — ROCURONIUM BROMIDE 10 MG: 10 INJECTION, SOLUTION INTRAVENOUS at 11:01

## 2025-01-08 RX ADMIN — HYDROMORPHONE HYDROCHLORIDE 1 MG: 2 INJECTION, SOLUTION INTRAMUSCULAR; INTRAVENOUS; SUBCUTANEOUS at 08:01

## 2025-01-08 RX ADMIN — DIAZEPAM 10 MG: 5 TABLET ORAL at 06:01

## 2025-01-08 RX ADMIN — FAMOTIDINE 20 MG: 20 TABLET, FILM COATED ORAL at 09:01

## 2025-01-08 RX ADMIN — KETOROLAC TROMETHAMINE 30 MG: 30 INJECTION, SOLUTION INTRAMUSCULAR at 01:01

## 2025-01-08 RX ADMIN — ROCURONIUM BROMIDE 10 MG: 10 INJECTION, SOLUTION INTRAVENOUS at 09:01

## 2025-01-08 RX ADMIN — ACETAMINOPHEN 1000 MG: 10 INJECTION, SOLUTION INTRAVENOUS at 02:01

## 2025-01-08 RX ADMIN — KETOROLAC TROMETHAMINE 30 MG: 30 INJECTION, SOLUTION INTRAMUSCULAR at 09:01

## 2025-01-08 RX ADMIN — SUGAMMADEX 50 MG: 100 INJECTION, SOLUTION INTRAVENOUS at 01:01

## 2025-01-08 RX ADMIN — DEXAMETHASONE SODIUM PHOSPHATE 8 MG: 4 INJECTION, SOLUTION INTRA-ARTICULAR; INTRALESIONAL; INTRAMUSCULAR; INTRAVENOUS; SOFT TISSUE at 07:01

## 2025-01-08 RX ADMIN — ROCURONIUM BROMIDE 10 MG: 10 INJECTION, SOLUTION INTRAVENOUS at 08:01

## 2025-01-08 RX ADMIN — SODIUM CHLORIDE, POTASSIUM CHLORIDE, SODIUM LACTATE AND CALCIUM CHLORIDE: 600; 310; 30; 20 INJECTION, SOLUTION INTRAVENOUS at 04:01

## 2025-01-08 RX ADMIN — HYDROMORPHONE HYDROCHLORIDE 1 MG: 2 INJECTION, SOLUTION INTRAMUSCULAR; INTRAVENOUS; SUBCUTANEOUS at 11:01

## 2025-01-08 RX ADMIN — CEFAZOLIN 2 G: 2 INJECTION, POWDER, FOR SOLUTION INTRAMUSCULAR; INTRAVENOUS at 07:01

## 2025-01-08 RX ADMIN — DIMENHYDRINATE 50 MG: 50 TABLET ORAL at 06:01

## 2025-01-08 RX ADMIN — ROCURONIUM BROMIDE 50 MG: 10 INJECTION, SOLUTION INTRAVENOUS at 07:01

## 2025-01-08 RX ADMIN — FENTANYL CITRATE 100 MCG: 50 INJECTION, SOLUTION INTRAMUSCULAR; INTRAVENOUS at 07:01

## 2025-01-08 RX ADMIN — NALOXONE HYDROCHLORIDE 0.2 MCG: 0.4 INJECTION, SOLUTION INTRAMUSCULAR; INTRAVENOUS; SUBCUTANEOUS at 01:01

## 2025-01-08 RX ADMIN — CITALOPRAM HYDROBROMIDE 10 MG: 10 TABLET ORAL at 09:01

## 2025-01-08 RX ADMIN — PROPOFOL 150 MG: 10 INJECTION, EMULSION INTRAVENOUS at 07:01

## 2025-01-08 RX ADMIN — CEFTRIAXONE 2 G: 2 INJECTION, POWDER, FOR SOLUTION INTRAMUSCULAR; INTRAVENOUS at 04:01

## 2025-01-08 RX ADMIN — SUGAMMADEX 200 MG: 100 INJECTION, SOLUTION INTRAVENOUS at 01:01

## 2025-01-08 RX ADMIN — FAMOTIDINE 20 MG: 20 TABLET, FILM COATED ORAL at 06:01

## 2025-01-08 RX ADMIN — ROPIVACAINE HYDROCHLORIDE 30 ML: 5 INJECTION, SOLUTION EPIDURAL; INFILTRATION; PERINEURAL at 01:01

## 2025-01-08 RX ADMIN — ONDANSETRON 4 MG: 2 INJECTION INTRAMUSCULAR; INTRAVENOUS at 01:01

## 2025-01-08 RX ADMIN — LIDOCAINE HYDROCHLORIDE 80 MG: 20 INJECTION, SOLUTION EPIDURAL; INFILTRATION; INTRACAUDAL; PERINEURAL at 07:01

## 2025-01-08 RX ADMIN — MIDAZOLAM HYDROCHLORIDE 2 MG: 1 INJECTION, SOLUTION INTRAMUSCULAR; INTRAVENOUS at 07:01

## 2025-01-08 RX ADMIN — PHENAZOPYRIDINE 200 MG: 100 TABLET ORAL at 06:01

## 2025-01-08 RX ADMIN — MUPIROCIN: 20 OINTMENT TOPICAL at 09:01

## 2025-01-08 RX ADMIN — ONDANSETRON 4 MG: 2 INJECTION INTRAMUSCULAR; INTRAVENOUS at 07:01

## 2025-01-08 RX ADMIN — ROCURONIUM BROMIDE 10 MG: 10 INJECTION, SOLUTION INTRAVENOUS at 10:01

## 2025-01-08 RX ADMIN — SUGAMMADEX 150 MG: 100 INJECTION, SOLUTION INTRAVENOUS at 01:01

## 2025-01-08 RX ADMIN — PROPOFOL 50 MG: 10 INJECTION, EMULSION INTRAVENOUS at 08:01

## 2025-01-08 NOTE — ANESTHESIA PREPROCEDURE EVALUATION
01/08/2025  Haleigh Newman is a 44 y.o., female.      Pre-op Assessment    I have reviewed the Patient Summary Reports.     I have reviewed the Nursing Notes. I have reviewed the NPO Status.   I have reviewed the Medications.     Review of Systems  Anesthesia Hx:             Denies Family Hx of Anesthesia complications.    Denies Personal Hx of Anesthesia complications.                    Social:  Non-Smoker, No Alcohol Use       Cardiovascular:     Hypertension                                    Hypertension         Musculoskeletal:  Musculoskeletal Normal                Neurological:      Headaches      Dx of Headaches                           Endocrine:        Obesity / BMI > 30  Psych:  Psychiatric History  depression                Physical Exam  General: Well nourished, Cooperative, Alert and Oriented    Airway:  Mallampati: II / II  Mouth Opening: Normal  TM Distance: Normal  Neck ROM: Normal ROM    Dental:  Intact    Chest/Lungs:  Clear to auscultation    Heart:  Rate: Normal  Rhythm: Regular Rhythm  Sounds: Normal        Chemistry        Component Value Date/Time     12/22/2024 1227    K 3.5 12/22/2024 1227     (H) 12/22/2024 1227    CO2 22 12/22/2024 1227    BUN 6 (L) 12/22/2024 1227    CREATININE 0.76 12/22/2024 1227    GLU 72 (L) 12/22/2024 1227        Component Value Date/Time    CALCIUM 9.1 12/22/2024 1227    ALKPHOS 76 12/22/2024 1227    AST 27 12/22/2024 1227    ALT 22 12/22/2024 1227    BILITOT 0.4 12/22/2024 1227    EGFRNONAA 81 07/14/2022 0900        Lab Results   Component Value Date    WBC 8.49 12/22/2024    HGB 12.0 12/22/2024    HCT 39.1 12/22/2024     12/22/2024     Results for orders placed or performed during the hospital encounter of 11/30/24   EKG 12-lead    Collection Time: 11/30/24  2:07 PM   Result Value Ref Range    QRS Duration 78 ms    [facility] QTC Calculation  401 ms    Narrative    Test Reason : I10,    Vent. Rate :  73 BPM     Atrial Rate :    BPM     P-R Int : 166 ms          QRS Dur :  78 ms      QT Int : 378 ms       P-R-T Axes :  51  60  45 degrees    QTcB Int : 401 ms    Sinus rhythm  Normal ECG    Confirmed by Trev Galarza ([id number]) on 12/2/2024 5:01:47 PM    Referred By: [email or web address] SELF           Confirmed By: Trev Galarza         Anesthesia Plan  Type of Anesthesia, risks & benefits discussed:    Anesthesia Type: Gen ETT, Regional  Intra-op Monitoring Plan: Standard ASA Monitors  Post Op Pain Control Plan: multimodal analgesia and peripheral nerve block  Induction:  IV  Airway Plan: Direct  Informed Consent: Informed consent signed with the Patient and all parties understand the risks and agree with anesthesia plan.  All questions answered.   ASA Score: 2  Day of Surgery Review of History & Physical: H&P Update referred to the surgeon/provider.I have interviewed and examined the patient. I have reviewed the patient's H&P dated: There are no significant changes.     Ready For Surgery From Anesthesia Perspective.     .

## 2025-01-08 NOTE — BRIEF OP NOTE
[facility] - Periop Services  Brief Operative Note    SUMMARY     Surgery Date: 1/8/2025     Surgeons and Role:     * Dawood Ma MD - Primary     * Marino Taylor MD - Consult     * Gwyn Perrin MD - Consult    Assisting Surgeon: None    Pre-op Diagnosis:  Menorrhagia with irregular cycle [N92.1]    Post-op Diagnosis:  Post-Op Diagnosis Codes:     * Menorrhagia with irregular cycle [N92.1]     * Female pelvic peritoneal adhesions [N73.6]     * Cyst of right ovary [N83.201]     * Intraoperative bladder injury [N99.81]    Procedure(s) (LRB):  XI ROBOTIC HYSTERECTOMY,WITH BILATERAL SALPINGO-OOPHORECTOMY,RIGHT OVARY (Bilateral)  XI ROBOTIC LYSIS, ADHESIONS  REPAIR, BLADDER (N/A)  LAPAROTOMY, EXPLORATORY (N/A)  CYSTOSCOPY, WITH RETROGRADE PYELOGRAM (Bilateral)    Procedure correction: Total robotic hysterectomy with bilateral salpingectomy and right oophorectomy    Anesthesia: General    Implants:  * No implants in log *    Operative Findings: Enlarged 9 wk size uterus with severe thick adhesions of the uterus to the anterior abdominal wall. Moderate omental adhesions. Enlarged cystic right ovary. Incidental cystotomy during adhesiolysis. Urology called in for repair. See separate op note.     Estimated Blood Loss: 100 mL    Estimated Blood Loss has been documented.         Specimens:  cervix, uterus, bilateral fallopian tubes, right ovary.  Specimen (24h ago, onward)       Start     Ordered    01/08/25 1018  Surgical Pathology  RELEASE UPON ORDERING         01/08/25 1018                    [id number]

## 2025-01-08 NOTE — OR NURSING
1353- Pt arrived pacu. Sedated. Patient had been given NARCAN in OR. Dressing on abdomen clean, dry and intact. JUNITO drain in place to midline abdominal incision. Drained 18ml of sanguinous fluid from JUNITO drain. 4 lap sites on abdomen covered with island dressing. No signs of drainage or bleeding. Peripad in place. No signs of bleeding or drainage. Pt has jackson catheter in place draining small amount of clear orange urine. No signs of pain or distress.      1415- Patient resting with eyes closed. Will respond when spoken to. Vss, respirations even et unlabored. Vertical incision dressing on abdomen has small amount of sanguinous drainage on dressing. Drainage marked. Island dressing on abdomen covering laprascopic sites clean, dry and intact. Peripad in place with no signs of drainage or bleeding. Jackson in place draining small amount of clear orange urine. Patient drowsy. Denies pain at this time. Additional blankets placed on patient for patient comfort.    1440- Patient resting comfortably. Vss, respirations even et unlabored. Ordered IV Ofirmev  started. Patient denies pain at this time. Dressing on vertical incision on abdomen shows no additional drainage at this time. JUNITO drain compressed and to bulb suction draining small amount of sanguinous fluid.       1507- patient drowsy. Responds appropriately to verbal cues. Denies pain at this time. Patient ready for discharge per pacu criteria. Jackson drained with 100ml of clear orange urine. JUNITO drain emptied with 27ml of sanguinous fluid. JUNITO drain compressed and to bulb suction. Patient transported to room 455 by stretcher.     1520- Report given to LYDIA Parsons. VS: 150/75, hr 78, rr 16, t- 97.4, O2 98% on 2L Nasal cannula.

## 2025-01-08 NOTE — ANESTHESIA PROCEDURE NOTES
Intubation    Date/Time: 1/8/2025 7:48 AM    Performed by: Beatriz Choe CRNA  Authorized by: James Bailey MD    Intubation:     Induction:  Intravenous    Intubated:  Postinduction    Mask Ventilation:  Easy mask    Attempts:  1    Attempted By:  CRNA    Blade:  Selma 4    Laryngeal View Grade: Grade I - full view of cords      Difficult Airway Encountered?: No      Complications:  None    Airway Device:  Oral endotracheal tube    Airway Device Size:  7.5    Style/Cuff Inflation:  Cuffed    Inflation Amount (mL):  8    Tube secured:  21    Secured at:  The lips    Placement Verified By:  Capnometry    Complicating Factors:  None    Findings Post-Intubation:  BS equal bilateral and atraumatic/condition of teeth unchanged

## 2025-01-08 NOTE — OP NOTE
[facility] - Periop Services  Brief Operative Note     SUMMARY      Surgery Date: 1/8/2025      Surgeons and Role:     * Dawood Ma MD - Primary     * Marino Taylor MD - Consult     * Gwyn Perrin MD - Consult     Assisting Surgeon: None     Pre-op Diagnosis:  Menorrhagia with irregular cycle [N92.1]     Post-op Diagnosis:  Post-Op Diagnosis Codes:     * Menorrhagia with irregular cycle [N92.1]     * Female pelvic peritoneal adhesions [N73.6]     * Cyst of right ovary [N83.201]     * Intraoperative bladder injury [N99.81]     Procedure(s) (LRB):  XI ROBOTIC HYSTERECTOMY,WITH BILATERAL SALPINGO-OOPHORECTOMY,RIGHT OVARY (Bilateral)  XI ROBOTIC LYSIS, ADHESIONS  REPAIR, BLADDER (N/A)  LAPAROTOMY, EXPLORATORY (N/A)  CYSTOSCOPY, WITH RETROGRADE PYELOGRAM (Bilateral)     Procedure correction: Total robotic hysterectomy with bilateral salpingectomy and right oophorectomy     Anesthesia: General     Implants:  * No implants in log *     Operative Findings: Enlarged 9 wk size uterus with severe thick adhesions of the uterus to the anterior abdominal wall. Moderate omental adhesions. Enlarged cystic right ovary. Incidental cystotomy during adhesiolysis. Urology (Dr. Perrin) called in for repair. Urology called in general surgery (Dr. Taylor) to assist in adhesiolysis. See separate op note.      Estimated Blood Loss: 100 mL     Estimated Blood Loss has been documented.         Specimens:  cervix, uterus, bilateral fallopian tubes, right ovary.  Specimen (24h ago, onward)          Start     Ordered     01/08/25 1018   Surgical Pathology  RELEASE UPON ORDERING         01/08/25 1018               Procedure description:      The patient was taken to the operating room with IV fluids running and pneumatic compression stockings applied to the lower extremities and turned on prior to the beginning of the procedure. General anesthesia was then obtained without difficulty. The patient was then placed in the dorsal  lithotomy position with Mandeep type stirrups. Her arms were tucked bilaterally. The patient was then prepped and draped. A time-out was then performed with Dr. Ma present. Preoperative antibiotics were confirmed.    A Quintero catheter was placed in the bladder and inflated with 10 mL of sterile saline. A bivalve speculum was placed in the vagina and the anterior lip of the cervix was grasped with a single-tooth tenaculum. Two stitches were placed with Vicryl at 10 and 2 o'clock position to be used for retraction. The uterus was sounded to 9 cm. An 3 uterine manipulator was placed. Sterile saline was placed in the uterine balloon and then saline placed in the vaginal balloon. It was covered with sterile towel. Attention was then turned to the abdomen.    The supraumbilical area was anesthetized with Marcaine with epinephrine. A vertical skin incision was made with a scalpel. An 5 mm trocar was placed in the intraabdominal cavity via Optiview technique. Pneumoperitoneum was achieved with CO2 gas, and the above findings were noted. The patient was placed in extreme Trendelenburg position. The decision was made to do a 3-port robotic hysterectomy at this time. Two additional robotic ports were placed approximately 12-15 cm lateral to the umbilicus. The skin was anesthetized with Marcaine with epinephrine. A peritoneal bleb was noted on the anterior aspect of the abdomen. The skin was incised with a scalpel and two 8 mm robotic trocars were placed lateral to midline.  After that, one 5 mm trocar was then inserted just superiorly and lateral to the infraumbilical port to use for surgical assistance. The 5 mm trocar was replaced with a robotic 8 mm trocar. All of these trocars were inserted under direct visualization.  Then it was noted that there were multiple omental adhesions to the anterior abdominal wall. These were taken down with the LigaSure. Excellent hemostasis noted.  The patient side cart was docked on the  patient's left side. A bipolar forceps and a vessel sealer were introduced into the abdominal cavity under direct laparoscopic visualization. The instruments were placed next to the uterus. Attention was then turned to the surgeon's console.    Severe thick adhesions were noted from the uterus to the anterior abdominal wall from the fundus to mid uterus.   Starting on the right side, the ovary was grasped with a grasper and retracted to the opposite side. The ureter was identified and found to be well away from the operative field. The tube was carried out to the fimbriae and the mesosalpinx was then serially transected using the vessel sealer to the cornu. The right ovary had multiple ovarian cysts and was enlarged. Therefore, the decision was made to remove the right ovary. The right ovarian vessels were grasped, fulgurated and cut using the vessel sealer and the broad ligament was incised to the uterine cornu. Excellent hemostasis noted.   Then the left tube was carried out to the fimbriae and the mesosalpinx was then serially transected using the vessel sealer to the cornu.  Then the vessel sealer was replaced with the monopolar scissors. The bipolar forceps and monopolar scissors were then used to sequentially clamp, coagulate and cut the broad ligament, heading towards the round ligament.   Due to the severe adhesions the round ligaments were not well visualized. Using the monopolar scissors and bipolar forceps, the adhesions were taken down with blunt and sharp dissection and coagulation and cutting in layers from the right and left side. This part of the case took at least 30 minutes. Then once the round ligaments were better visualized the case was continued.     The round ligament was then serially clamped, coagulated, and cut. The utero-ovarian ligament was then serially clamped, coagulated and transected on the left. The anterior leaf of the peritoneum was dissected down to the level of the vesicouterine  peritoneum. There were multiple fatty and thick and thin adhesions that were taken down in layers. Then when dissecting these adhesions off the anterior uterine peritoneum, an incidental cystotomy was made.     Urology called at this time for recommendations. Dr. Perrin recommended repair after the hysterectomy was completed in an open fashion with cystoscopy with retrograde pyelogram.     The posterior peritoneum was then dissected inferiorly. Then once the cystotomy was made, then bladder was identified and the dissection was continued. The vesicouterine peritoneum was then opened using monopolar scissors and the bladder was dissected off the lower uterine segment and upper vagina.     The uterine vessels were skeletonized using the monopolar scissors and bipolar forceps. The bipolar foceps and monopolar scissors were then used to coagulate and cut the uterine vessels. Theses were found to be hemostatic.   Using the manipulator device and pushing the uterus into the pelvis, the cervicovaginal junction was delineated by the colpotomy ring.  The colpotomy was then made starting on the posterior aspect of the cervicovaginal junction, and coming around anteriorly, following the cup as a guide. The vaginal angles were coagulated using the bipolar forceps while making the colpotomy laterally.    Once the uterus was completely detached, the uterus, cervix, and bilateral tubes were successfully removed from the abdomen. The vaginal cuff was then closed using 2-0 V-Loc suture in a running fashion. At the right aspect of the vaginal cuff, the suture was incidentally cut with the needle . Therefore another V-Loc suture was used at the right apex and was locked on the other side of the cut suture. Both suture were cut. The pelvis was thoroughly irrigated and excellent hemostasis was noted in the operative field.    The robot was then undocked and the patient was taken out steep Trendelenburg.     Then Dr. Perrin and   Brandon and myself scrubbed in for the open cystotomy repair, drain placement, and cystoscopy with retrograde pyelogram after the patient was repositioned on the bed and re-draped. See addition op noted.    The skin incisions were all closed with staples. Island dressings applied. Quintero catheter in place.     The patient tolerated the procedure well. All the counts were correct x2. The patient was taken to the recovery room in stable condition.      Per Dr. Perrin's recommendations, will treat her with Rocephin 2g IV q 24h x 72 hours.   Plan to monitor as an inpatient x 72 hours.

## 2025-01-08 NOTE — INTERVAL H&P NOTE
The patient has been examined and the H&P has been reviewed:    I concur with the findings and no changes have occurred since H&P was written.    Anesthesia/Surgery risks, benefits and alternative options discussed and understood by patient/family.          Active Hospital Problems    Diagnosis  POA    *Menorrhagia with irregular cycle [N92.1]  Yes    Iron deficiency anemia secondary to blood loss (chronic) [D50.0]  Yes      Resolved Hospital Problems   No resolved problems to display.

## 2025-01-08 NOTE — TRANSFER OF CARE
"Anesthesia Transfer of Care Note    Patient: Haleigh Newman    Procedure(s) Performed: Procedure(s) (LRB):  XI ROBOTIC HYSTERECTOMY,WITH BILATERAL SALPINGO-OOPHORECTOMY,RIGHT OVARY (Bilateral)  XI ROBOTIC LYSIS, ADHESIONS  REPAIR, BLADDER (N/A)  LAPAROTOMY, EXPLORATORY (N/A)  CYSTOSCOPY, WITH RETROGRADE PYELOGRAM (Bilateral)    Patient location: PACU    Anesthesia Type: general    Transport from OR: Transported from OR on 6-10 L/min O2 by face mask with adequate spontaneous ventilation    Post pain: adequate analgesia    Post assessment: no apparent anesthetic complications    Post vital signs: stable    Level of consciousness: responds to stimulation and sedated    Nausea/Vomiting: no nausea/vomiting    Complications: none    Transfer of care protocol was followed      Last vitals: Visit Vitals  BP (!) 196/103 (BP Location: Right arm, Patient Position: Lying)   Pulse 94   Temp 36.7 °C (98 °F)   Resp (!) 28   Ht 5' 4" (1.626 m)   Wt 100.2 kg (221 lb)   LMP 12/07/2024 (Exact Date)   SpO2 (!) 92%   Breastfeeding No   BMI 37.93 kg/m²     "

## 2025-01-09 LAB
ANION GAP SERPL CALCULATED.3IONS-SCNC: 10 MMOL/L (ref 7–16)
BASOPHILS # BLD AUTO: 0.01 K/UL (ref 0–0.2)
BASOPHILS NFR BLD AUTO: 0.1 % (ref 0–1)
BUN SERPL-MCNC: 8 MG/DL (ref 7–19)
BUN/CREAT SERPL: 10 (ref 6–20)
CALCIUM SERPL-MCNC: 7.6 MG/DL (ref 8.4–10.2)
CHLORIDE SERPL-SCNC: 106 MMOL/L (ref 98–107)
CO2 SERPL-SCNC: 25 MMOL/L (ref 22–29)
CREAT SERPL-MCNC: 0.83 MG/DL (ref 0.55–1.02)
DIFFERENTIAL METHOD BLD: ABNORMAL
EGFR (NO RACE VARIABLE) (RUSH/TITUS): 89 ML/MIN/1.73M2
EOSINOPHIL # BLD AUTO: 0 K/UL (ref 0–0.5)
EOSINOPHIL NFR BLD AUTO: 0 % (ref 1–4)
ERYTHROCYTE [DISTWIDTH] IN BLOOD BY AUTOMATED COUNT: 13.5 % (ref 11.5–14.5)
ESTROGEN SERPL-MCNC: NORMAL PG/ML
GLUCOSE SERPL-MCNC: 109 MG/DL (ref 74–100)
HCT VFR BLD AUTO: 34.2 % (ref 38–47)
HGB BLD-MCNC: 10.5 G/DL (ref 12–16)
IMM GRANULOCYTES # BLD AUTO: 0.06 K/UL (ref 0–0.04)
IMM GRANULOCYTES NFR BLD: 0.5 % (ref 0–0.4)
INSULIN SERPL-ACNC: NORMAL U[IU]/ML
LAB AP GROSS DESCRIPTION: NORMAL
LAB AP LABORATORY NOTES: NORMAL
LYMPHOCYTES # BLD AUTO: 1.37 K/UL (ref 1–4.8)
LYMPHOCYTES NFR BLD AUTO: 11.1 % (ref 27–41)
MCH RBC QN AUTO: 28.5 PG (ref 27–31)
MCHC RBC AUTO-ENTMCNC: 30.7 G/DL (ref 32–36)
MCV RBC AUTO: 92.9 FL (ref 80–96)
MONOCYTES # BLD AUTO: 0.76 K/UL (ref 0–0.8)
MONOCYTES NFR BLD AUTO: 6.2 % (ref 2–6)
MPC BLD CALC-MCNC: 12.6 FL (ref 9.4–12.4)
NEUTROPHILS # BLD AUTO: 10.1 K/UL (ref 1.8–7.7)
NEUTROPHILS NFR BLD AUTO: 82.1 % (ref 53–65)
NRBC # BLD AUTO: 0 X10E3/UL
NRBC, AUTO (.00): 0 %
PLATELET # BLD AUTO: 240 K/UL (ref 150–400)
POTASSIUM SERPL-SCNC: 3.9 MMOL/L (ref 3.5–5.1)
RBC # BLD AUTO: 3.68 M/UL (ref 4.2–5.4)
SODIUM SERPL-SCNC: 137 MMOL/L (ref 136–145)
T3RU NFR SERPL: NORMAL %
WBC # BLD AUTO: 12.3 K/UL (ref 4.5–11)

## 2025-01-09 PROCEDURE — 27000758 HC SPIROMETER

## 2025-01-09 PROCEDURE — 25000003 PHARM REV CODE 250: Performed by: OBSTETRICS & GYNECOLOGY

## 2025-01-09 PROCEDURE — 11000001 HC ACUTE MED/SURG PRIVATE ROOM

## 2025-01-09 PROCEDURE — 94761 N-INVAS EAR/PLS OXIMETRY MLT: CPT

## 2025-01-09 PROCEDURE — 63600175 PHARM REV CODE 636 W HCPCS: Mod: TB | Performed by: OBSTETRICS & GYNECOLOGY

## 2025-01-09 PROCEDURE — 85025 COMPLETE CBC W/AUTO DIFF WBC: CPT | Performed by: OBSTETRICS & GYNECOLOGY

## 2025-01-09 PROCEDURE — 36415 COLL VENOUS BLD VENIPUNCTURE: CPT | Performed by: OBSTETRICS & GYNECOLOGY

## 2025-01-09 PROCEDURE — 80048 BASIC METABOLIC PNL TOTAL CA: CPT | Performed by: OBSTETRICS & GYNECOLOGY

## 2025-01-09 PROCEDURE — 25000003 PHARM REV CODE 250: Performed by: HOSPITALIST

## 2025-01-09 PROCEDURE — 99233 SBSQ HOSP IP/OBS HIGH 50: CPT | Mod: ,,, | Performed by: HOSPITALIST

## 2025-01-09 RX ORDER — OXYCODONE AND ACETAMINOPHEN 10; 325 MG/1; MG/1
1 TABLET ORAL EVERY 4 HOURS PRN
Qty: 30 TABLET | Refills: 0 | Status: SHIPPED | OUTPATIENT
Start: 2025-01-09

## 2025-01-09 RX ORDER — IBUPROFEN 600 MG/1
600 TABLET ORAL EVERY 6 HOURS PRN
Qty: 30 TABLET | Refills: 1 | Status: SHIPPED | OUTPATIENT
Start: 2025-01-09 | End: 2025-01-14 | Stop reason: HOSPADM

## 2025-01-09 RX ORDER — ALUMINUM HYDROXIDE, MAGNESIUM HYDROXIDE, AND SIMETHICONE 2400; 240; 2400 MG/30ML; MG/30ML; MG/30ML
30 SUSPENSION ORAL EVERY 6 HOURS PRN
Status: DISCONTINUED | OUTPATIENT
Start: 2025-01-09 | End: 2025-01-14 | Stop reason: HOSPADM

## 2025-01-09 RX ADMIN — ALUMINUM HYDROXIDE, MAGNESIUM HYDROXIDE, AND DIMETHICONE 30 ML: 400; 400; 40 SUSPENSION ORAL at 04:01

## 2025-01-09 RX ADMIN — FAMOTIDINE 20 MG: 20 TABLET, FILM COATED ORAL at 08:01

## 2025-01-09 RX ADMIN — OXYCODONE AND ACETAMINOPHEN 1 TABLET: 10; 325 TABLET ORAL at 06:01

## 2025-01-09 RX ADMIN — SODIUM CHLORIDE, POTASSIUM CHLORIDE, SODIUM LACTATE AND CALCIUM CHLORIDE: 600; 310; 30; 20 INJECTION, SOLUTION INTRAVENOUS at 09:01

## 2025-01-09 RX ADMIN — IBUPROFEN 600 MG: 600 TABLET ORAL at 05:01

## 2025-01-09 RX ADMIN — SODIUM CHLORIDE, POTASSIUM CHLORIDE, SODIUM LACTATE AND CALCIUM CHLORIDE: 600; 310; 30; 20 INJECTION, SOLUTION INTRAVENOUS at 12:01

## 2025-01-09 RX ADMIN — LOSARTAN POTASSIUM 100 MG: 100 TABLET, FILM COATED ORAL at 08:01

## 2025-01-09 RX ADMIN — HYDROMORPHONE HYDROCHLORIDE 1 MG: 2 INJECTION, SOLUTION INTRAMUSCULAR; INTRAVENOUS; SUBCUTANEOUS at 01:01

## 2025-01-09 RX ADMIN — CITALOPRAM HYDROBROMIDE 10 MG: 10 TABLET ORAL at 08:01

## 2025-01-09 RX ADMIN — POLYETHYLENE GLYCOL 3350 17 G: 17 POWDER, FOR SOLUTION ORAL at 08:01

## 2025-01-09 RX ADMIN — METOPROLOL SUCCINATE 50 MG: 50 TABLET, EXTENDED RELEASE ORAL at 08:01

## 2025-01-09 RX ADMIN — MUPIROCIN: 20 OINTMENT TOPICAL at 08:01

## 2025-01-09 RX ADMIN — KETOROLAC TROMETHAMINE 30 MG: 30 INJECTION, SOLUTION INTRAMUSCULAR at 05:01

## 2025-01-09 RX ADMIN — KETOROLAC TROMETHAMINE 30 MG: 30 INJECTION, SOLUTION INTRAMUSCULAR at 02:01

## 2025-01-09 RX ADMIN — SODIUM CHLORIDE, POTASSIUM CHLORIDE, SODIUM LACTATE AND CALCIUM CHLORIDE: 600; 310; 30; 20 INJECTION, SOLUTION INTRAVENOUS at 04:01

## 2025-01-09 RX ADMIN — OXYCODONE AND ACETAMINOPHEN 1 TABLET: 10; 325 TABLET ORAL at 12:01

## 2025-01-09 RX ADMIN — OXYCODONE AND ACETAMINOPHEN 1 TABLET: 10; 325 TABLET ORAL at 08:01

## 2025-01-09 RX ADMIN — CEFTRIAXONE 2 G: 2 INJECTION, POWDER, FOR SOLUTION INTRAMUSCULAR; INTRAVENOUS at 02:01

## 2025-01-09 RX ADMIN — HYDROMORPHONE HYDROCHLORIDE 1 MG: 2 INJECTION, SOLUTION INTRAMUSCULAR; INTRAVENOUS; SUBCUTANEOUS at 09:01

## 2025-01-09 RX ADMIN — ALUMINUM HYDROXIDE, MAGNESIUM HYDROXIDE, AND DIMETHICONE 30 ML: 400; 400; 40 SUSPENSION ORAL at 08:01

## 2025-01-09 NOTE — SUBJECTIVE & OBJECTIVE
Past Medical History:   Diagnosis Date    Anxiety with depression     Carboxyhemoglobinemia     says she smells it when she drives her car    Essential (primary) hypertension     Migraine without status migrainosus, not intractable 2024    Nicotine dependence     SUDHEER on CPAP        Past Surgical History:   Procedure Laterality Date    BILATERAL TUBAL LIGATION      BLADDER REPAIR  2025    Dr. Perrin     SECTION      LYSIS OF ADHESIONS  2025    Dr. Taylor    ROBOTIC HYSTERECTOMY, WITH SALPINGO-OOPHORECTOMY Bilateral 2025    Dr. Dawood Ma       Review of patient's allergies indicates:  No Known Allergies    No current facility-administered medications on file prior to encounter.     Current Outpatient Medications on File Prior to Encounter   Medication Sig    losartan (COZAAR) 100 MG tablet Take 1 tablet (100 mg total) by mouth once daily.    metoprolol succinate (TOPROL-XL) 50 MG 24 hr tablet Take 1 tablet (50 mg total) by mouth once daily.    [DISCONTINUED] miSOPROStoL (CYTOTEC) 200 MCG Tab Take 2 tablets (400 mcg total) by mouth once. Night before procedure. for 1 dose    [DISCONTINUED] ferrous sulfate (FEOSOL) 325 mg (65 mg iron) Tab tablet Take 1 tablet (325 mg total) by mouth daily with breakfast. (Patient not taking: Reported on 2025)    [DISCONTINUED] norethindrone (AYGESTIN) 5 mg Tab Take 1 tablet (5 mg total) by mouth 2 (two) times a day. (Patient not taking: Reported on 2025)     Family History       Problem Relation (Age of Onset)    Cancer Son, Paternal Aunt, Maternal Grandmother, Paternal Grandmother    Diabetes Maternal Grandmother, Maternal Grandfather, Paternal Grandmother, Paternal Grandfather    Hypertension Mother, Father, Maternal Grandmother, Maternal Grandfather, Paternal Grandmother, Paternal Grandfather    Thyroid disease Mother, Sister, Maternal Aunt          Tobacco Use    Smoking status: Former     Current packs/day: 0.00     Types: Cigarettes      Quit date: 10/2007     Years since quittin.2    Smokeless tobacco: Never    Tobacco comments:     Has taken chantix in the past.    Substance and Sexual Activity    Alcohol use: Never    Drug use: Never    Sexual activity: Yes     Partners: Male     Birth control/protection: See Surgical Hx     Review of Systems   Constitutional:  Negative for appetite change, chills, fatigue, fever and unexpected weight change.   HENT:  Negative for congestion, mouth sores, nosebleeds, sinus pain, sore throat and trouble swallowing.    Respiratory:  Negative for apnea, cough, chest tightness and shortness of breath.    Cardiovascular:  Negative for chest pain, palpitations and leg swelling.   Gastrointestinal:  Negative for blood in stool, constipation, diarrhea, nausea and vomiting.        Abd pain appropriate post op   Genitourinary:  Negative for decreased urine volume, difficulty urinating, dysuria and frequency.   Musculoskeletal:  Negative for arthralgias, back pain and neck pain.   Skin:  Negative for rash.   Neurological:  Negative for syncope, light-headedness and headaches.   Hematological:  Does not bruise/bleed easily.   Psychiatric/Behavioral:  Negative for confusion and suicidal ideas.      Objective:     Vital Signs (Most Recent):  Temp: 98.3 °F (36.8 °C) (25)  Pulse: 82 (25)  Resp: 18 (25)  BP: (!) 154/86 (25)  SpO2: 100 % (25) Vital Signs (24h Range):  Temp:  [94.6 °F (34.8 °C)-98.6 °F (37 °C)] 98.3 °F (36.8 °C)  Pulse:  [72-94] 82  Resp:  [14-30] 18  SpO2:  [90 %-100 %] 100 %  BP: (134-196)/() 154/86     Weight: 100.2 kg (221 lb)  Body mass index is 37.93 kg/m².     Physical Exam  Vitals and nursing note reviewed. Exam conducted with a chaperone present.   Constitutional:       Appearance: Normal appearance.   HENT:      Head: Atraumatic.      Mouth/Throat:      Mouth: Mucous membranes are moist.      Pharynx: Oropharynx is clear.   Eyes:       Conjunctiva/sclera: Conjunctivae normal.      Pupils: Pupils are equal, round, and reactive to light.   Cardiovascular:      Rate and Rhythm: Normal rate and regular rhythm.      Pulses: Normal pulses.      Heart sounds: Normal heart sounds.   Pulmonary:      Effort: Pulmonary effort is normal.      Breath sounds: Normal breath sounds.   Abdominal:      General: Bowel sounds are normal. There is no distension.      Tenderness: There is abdominal tenderness. There is no guarding or rebound.   Musculoskeletal:         General: Normal range of motion.      Right lower leg: No edema.      Left lower leg: No edema.   Skin:     General: Skin is warm and dry.      Coloration: Skin is not jaundiced or pale.      Findings: No bruising, lesion or rash.   Neurological:      General: No focal deficit present.      Mental Status: She is alert and oriented to person, place, and time.   Psychiatric:         Mood and Affect: Mood normal.              CRANIAL NERVES     CN III, IV, VI   Pupils are equal, round, and reactive to light.       Significant Labs: All pertinent labs within the past 24 hours have been reviewed.    Significant Imaging: I have reviewed all pertinent imaging results/findings within the past 24 hours.

## 2025-01-09 NOTE — NURSING
Patient ambulated around room several times with just standby assist. Showed patient how to hold jackson catheter while ambulating. Patient now sitting in chair, showing patient to place jackson catheter below bladder level but not on the floor. Patient states that she understands.

## 2025-01-09 NOTE — OP NOTE
DATE OF OPERATION: 2025     PREOPERATIVE DIAGNOSIS: Bladder Laceration .     POSTOPERATIVE DIAGNOSIS: Same as above      OPERATIONS PERFORMED:  Exploratory laparotomy  Open cystorrrhaphy  Cystoscopy  Bilateral retrograde pyelography     SURGEON: Gwyn Perrin     ASSISTANT: Jeff Taylor and Anil      EBL: 25ml     SPECIMENS: None      ANESTHESIA: General         INDICATIONS:  The patient is a 44-year-old female with a history of  delivery that developed adhesions to the abdominal wall and bladder and when undergoing a robotic hysterectomy a laceration of the bladder occurred and I was asked called into the room for an intraoperative consultation.  The operating surgeon was performing a robotic hysterectomy and had not yet removed the uterus but the vessels had already been ligated and the vaginal cuff was being performed.  There was evidence of adhesions to the bladder and inflammatory changes visualized under magnified vision.  There was an open defect with a hole into the bladder visualized.  The robotic surgeon completed the hysterectomy and removed the specimen and the patient was re-prepped and draped for exploratory laparotomy.        DESCRIPTION OF OPERATION: The patient was positioned and prepped and draped.  I had asked Dr. Taylor to help assist me in the abdomen and a laparotomy was performed.  The umbilical port incision was utilized and a midline incision was made from the lower abdomen skirting right lateral umbilicus including the port incision.  The rectus fascia was incised and the abdomen was opened.  There was fluid in the abdomen consistent with urine which was irrigated and the abdomen was examined.  The bladder was identified and the incision was visualized and was approximately 1 cm in size at the dome.  There were associated adhesions laterally involving the bladder but no additional injuries. The bladder laceration was opened extending approximately 4 cm in length so that the interior  of the bladder could be evaluated.  The bladder was examined. The ureteral orifices were normal and there was no additional injury identified.  The bladder was closed in layers.  The mucosa was closed with Monocryl and the detrusor was closed with Vicryl followed by an interrupted vicryl seromuscular reinforcement.  The bladder was then distended with 300 mL of saline and there was no leak confirming a water tight closure.  A 19 Uzbek Jay drain was placed and brought out through the right lower quadrant and the abdominal fascial was closed using PDS. The incision was irrigated and the skin was stapled. The port sites were stapled. Cystoscopy was then performed. The bladder repair was visualized.  Both ureteral orifices were visualized and were normal and orthotopic.  A 5 Uzbek ureteral catheter was advanced into the right ureter and a retrograde pyelogram was performed.  The ureteral caliber and course was normal.  A 5 Uzbek ureteral catheter was advanced into the left ureter and a retrograde pyelogram was performed.  The ureteral caliber and course was normal.  A 24 Uzbek Quintero catheter was placed and the bladder was drained.  10 mL of 2% viscous lidocaine was instilled per urethra to help with comfort.      Disposition:  The patient has a Quintero catheter in place and a intra-abdominal drain and plan is for maximal decompression until the bladder has completely healed.

## 2025-01-09 NOTE — PROGRESS NOTES
Postoperative Note    Assessment/Plan:  44 y.o. with Menorrhagia with irregular cycle [N92.1] s/p XI ROBOTIC HYSTERECTOMY,WITH BILATERAL SALPINGECTOMY and OOPHORECTOMY,RIGHT OVARY, XI ROBOTIC LYSIS, ADHESIONS. Then by Urology: Exploratory laparotomy, open cystorrrhaphy, cystoscopy, and bilateral retrograde pyelography  1 Day Post-Op:    She is doing well postoperatively.   Will transition to oral pain medication for better pain control.   Encouraged to ambulate in room and shower today.   Quintero teaching today. Also nurse to teach patient how to use a leg bag in preparation for discharge in a few days.   Gas-X/Miralax.    Per Urology recommendations: Continue Ceftriaxone x 72 hours. Plan to monitor until Saturday and will be stable to d/c home on Saturday. Quintero in situ. Drain management per Urology.    Discussed with the patient today and after surgery last night about the case and that during the hysterectomy there was an incidental cystotomy due to severe adhesive disease. Pictures reviewed with the patient. Then Urology was consulted to performed an exploratomy laparotomy with bladder injury repair and a cystoscopy and retrograde pyelogram. Discussed the injury was well healed and the ureters were without injury. Discussed that she does have a midline incision and staples. Discussed her lifting restrictions would be longer--up to 4 weeks. Also discussed that she would follow up with Urology outpatient who would recommend when the Quintero and the drain should be removed. Discussed leg bag teaching and Quintero care teaching prior to discharge. She verbalized her understanding and was grateful for the care she received. All questions answered.     [unfilled]    CC: No chief complaint on file.      Subjective:  S/p XI ROBOTIC HYSTERECTOMY,WITH BILATERAL SALPINGECTOMY and OOPHORECTOMY,RIGHT OVARY, XI ROBOTIC LYSIS, ADHESIONS. Then by Urology: Exploratory laparotomy, open cystorrrhaphy, cystoscopy, and bilateral  "retrograde pyelography 1 Day Post-Op    Pt seen and examined.  Doing well. Pain is minimally controlled. Will plan to transition to oral pain medication today. She denies any n/v. She tolerated a regular diet. She is passing small amount of flatus. She has not ambulated yet.     Review of Systems - The following ROS was otherwise negative, except as noted in the HPI:  constitutional, respiratory, cardiovascular, gastrointestinal, genitourinary    Objective:  /80   Pulse 85   Temp 98 °F (36.7 °C) (Oral)   Resp 18   Ht 5' 4" (1.626 m)   Wt 100.2 kg (221 lb)   LMP 12/07/2024 (Exact Date)   SpO2 98%   Breastfeeding No   BMI 37.93 kg/m²   General:  Alert, well appearing, no acute distress  Abdomen:  Soft, appropriately tender to palpation, mildly distended  Incision:  3 laparoscopic incisions bandage c/d, no significant drainage or erythema. Also dressing on vertical midline incision is also clean and dry. Bandage on drain is clean and dry.   Drain is draining serosanguinous drainage.  Quintero catheter is draining clear urine.   Extremities:  No redness or tenderness in LE, neg Benjamin's sign    UOP x 24 hours was 2000 cc.   Drain output since surgery 2 pm to 0700 was 95 cc.     Path:   Specimens (From admission, onward)       Start     Ordered    01/08/25 1018  Surgical Pathology  RELEASE UPON ORDERING         01/08/25 1018                   " None

## 2025-01-09 NOTE — CONSULTS
[facility]  [facility] Medicine  Consult Note    Patient Name: Haleigh Newman  MRN: [id number]  Admission Date: 2025  Hospital Length of Stay: 0 days  Attending Physician: Dawood Ma MD   Primary Care Provider: Michael Duran DO           Patient information was obtained from patient, parent, past medical records, and ER records.     Consults  Subjective:     Principal Problem: Encounter for postoperative care    Chief Complaint: No chief complaint on file.       HPI: 43 yo F underwent robotic hysterectomy with BSO, KE and bladder repair today.  Patient with known HTN and currently with elevated BP.   Patient is doing well.  No complaints of pain.  She is asking how long she will need to keep the catheter in place.  Will monitor UOP overnight.  No N/V.  She does use a CPAP at home but says she is comfortable with the nasal cannula but will need to monitor closely for CO2 retention and restart CPAP tomorrow if no N/V tonight.      Remainder of ROS as below.  See assessment and plan below for problem based evaluation      Past Medical History:   Diagnosis Date    Anxiety with depression     Carboxyhemoglobinemia     says she smells it when she drives her car    Essential (primary) hypertension     Migraine without status migrainosus, not intractable 2024    Nicotine dependence     SUDHEER on CPAP        Past Surgical History:   Procedure Laterality Date    BILATERAL TUBAL LIGATION      BLADDER REPAIR  2025    Dr. Perrin     SECTION      LYSIS OF ADHESIONS  2025    Dr. Taylor    ROBOTIC HYSTERECTOMY, WITH SALPINGO-OOPHORECTOMY Bilateral 2025    Dr. Dawood Ma       Review of patient's allergies indicates:  No Known Allergies    No current facility-administered medications on file prior to encounter.     Current Outpatient Medications on File Prior to Encounter   Medication Sig    losartan (COZAAR) 100 MG tablet Take 1 tablet (100 mg total) by mouth once  daily.    metoprolol succinate (TOPROL-XL) 50 MG 24 hr tablet Take 1 tablet (50 mg total) by mouth once daily.    [DISCONTINUED] miSOPROStoL (CYTOTEC) 200 MCG Tab Take 2 tablets (400 mcg total) by mouth once. Night before procedure. for 1 dose    [DISCONTINUED] ferrous sulfate (FEOSOL) 325 mg (65 mg iron) Tab tablet Take 1 tablet (325 mg total) by mouth daily with breakfast. (Patient not taking: Reported on 2025)    [DISCONTINUED] norethindrone (AYGESTIN) 5 mg Tab Take 1 tablet (5 mg total) by mouth 2 (two) times a day. (Patient not taking: Reported on 2025)     Family History       Problem Relation (Age of Onset)    Cancer Son, Paternal Aunt, Maternal Grandmother, Paternal Grandmother    Diabetes Maternal Grandmother, Maternal Grandfather, Paternal Grandmother, Paternal Grandfather    Hypertension Mother, Father, Maternal Grandmother, Maternal Grandfather, Paternal Grandmother, Paternal Grandfather    Thyroid disease Mother, Sister, Maternal Aunt          Tobacco Use    Smoking status: Former     Current packs/day: 0.00     Types: Cigarettes     Quit date: 10/2007     Years since quittin.2    Smokeless tobacco: Never    Tobacco comments:     Has taken chantix in the past.    Substance and Sexual Activity    Alcohol use: Never    Drug use: Never    Sexual activity: Yes     Partners: Male     Birth control/protection: See Surgical Hx     Review of Systems   Constitutional:  Negative for appetite change, chills, fatigue, fever and unexpected weight change.   HENT:  Negative for congestion, mouth sores, nosebleeds, sinus pain, sore throat and trouble swallowing.    Respiratory:  Negative for apnea, cough, chest tightness and shortness of breath.    Cardiovascular:  Negative for chest pain, palpitations and leg swelling.   Gastrointestinal:  Negative for blood in stool, constipation, diarrhea, nausea and vomiting.        Abd pain appropriate post op   Genitourinary:  Negative for decreased urine volume,  difficulty urinating, dysuria and frequency.   Musculoskeletal:  Negative for arthralgias, back pain and neck pain.   Skin:  Negative for rash.   Neurological:  Negative for syncope, light-headedness and headaches.   Hematological:  Does not bruise/bleed easily.   Psychiatric/Behavioral:  Negative for confusion and suicidal ideas.      Objective:     Vital Signs (Most Recent):  Temp: 98.3 °F (36.8 °C) (01/08/25 1913)  Pulse: 82 (01/08/25 1924)  Resp: 18 (01/08/25 1913)  BP: (!) 154/86 (01/08/25 1924)  SpO2: 100 % (01/08/25 1924) Vital Signs (24h Range):  Temp:  [94.6 °F (34.8 °C)-98.6 °F (37 °C)] 98.3 °F (36.8 °C)  Pulse:  [72-94] 82  Resp:  [14-30] 18  SpO2:  [90 %-100 %] 100 %  BP: (134-196)/() 154/86     Weight: 100.2 kg (221 lb)  Body mass index is 37.93 kg/m².     Physical Exam  Vitals and nursing note reviewed. Exam conducted with a chaperone present.   Constitutional:       Appearance: Normal appearance.   HENT:      Head: Atraumatic.      Mouth/Throat:      Mouth: Mucous membranes are moist.      Pharynx: Oropharynx is clear.   Eyes:      Conjunctiva/sclera: Conjunctivae normal.      Pupils: Pupils are equal, round, and reactive to light.   Cardiovascular:      Rate and Rhythm: Normal rate and regular rhythm.      Pulses: Normal pulses.      Heart sounds: Normal heart sounds.   Pulmonary:      Effort: Pulmonary effort is normal.      Breath sounds: Normal breath sounds.   Abdominal:      General: Bowel sounds are normal. There is no distension.      Tenderness: There is abdominal tenderness. There is no guarding or rebound.   Musculoskeletal:         General: Normal range of motion.      Right lower leg: No edema.      Left lower leg: No edema.   Skin:     General: Skin is warm and dry.      Coloration: Skin is not jaundiced or pale.      Findings: No bruising, lesion or rash.   Neurological:      General: No focal deficit present.      Mental Status: She is alert and oriented to person, place, and  time.   Psychiatric:         Mood and Affect: Mood normal.              CRANIAL NERVES     CN III, IV, VI   Pupils are equal, round, and reactive to light.       Significant Labs: All pertinent labs within the past 24 hours have been reviewed.    Significant Imaging: I have reviewed all pertinent imaging results/findings within the past 24 hours.  Assessment/Plan:     * Encounter for postoperative care  Defer to primary surgical team.    Agree with pain control, DVT prophylaxis and rehab.         Essential (primary) hypertension  Patient's blood pressure range in the last 24 hours was: BP  Min: 134/116  Max: 196/103.The patient's inpatient anti-hypertensive regimen is listed below:  Current Antihypertensives  metoprolol succinate (TOPROL-XL) 24 hr tablet 50 mg, Daily, Oral  losartan tablet 100 mg, Daily, Oral  hydrALAZINE injection 10 mg, Every 6 hours PRN, Intravenous    Plan  - BP is uncontrolled, will adjust as follows:    - Restarted home antihypertensives with prn IV hydralazine as clinically indicated      Anxiety with depression  Patient works night shift and wants to continue her celexa qhs.          SUDHEER on CPAP  Holding CPAP at present due to abd surgery and potential for N/V and aspiration.    Will monitor for CO2 retention and recommend not over oxygenating and decreasing respiratory drive.        Migraine without status migrainosus, not intractable  Patient with prn triptan as OP   Will hold at this time due to uncontrolled HTN        VTE Risk Mitigation (From admission, onward)      None                Thank you for your consult. I will follow-up with patient. Please contact us if you have any additional questions.    Dr. Ibarra to follow while in hospital.      Delaney Laguerre MD  Department of Hospital Medicine   [facility]       Ambulatory

## 2025-01-09 NOTE — PROGRESS NOTES
[facility]  Wound Care    Patient Name:  Haleigh Newman   MRN:  [id number]  Date: 2025  Diagnosis: Encounter for postoperative care    History:     Past Medical History:   Diagnosis Date    Anxiety with depression     Carboxyhemoglobinemia     says she smells it when she drives her car    Essential (primary) hypertension     Migraine without status migrainosus, not intractable 2024    Nicotine dependence     SUDHEER on CPAP        Social History     Socioeconomic History    Marital status:    Tobacco Use    Smoking status: Former     Current packs/day: 0.00     Types: Cigarettes     Quit date: 10/2007     Years since quittin.2    Smokeless tobacco: Never    Tobacco comments:     Has taken chantix in the past.    Substance and Sexual Activity    Alcohol use: Never    Drug use: Never    Sexual activity: Yes     Partners: Male     Birth control/protection: See Surgical Hx     Social Drivers of Health     Financial Resource Strain: Patient Declined (2025)    Overall Financial Resource Strain (CARDIA)     Difficulty of Paying Living Expenses: Patient declined   Food Insecurity: Patient Declined (2025)    Hunger Vital Sign     Worried About Running Out of Food in the Last Year: Patient declined     Ran Out of Food in the Last Year: Patient declined   Transportation Needs: Patient Declined (2025)    TRANSPORTATION NEEDS     Transportation : Patient declined   Stress: Patient Declined (2025)    [employer] [place] [employer] - Occupational Stress Questionnaire     Feeling of Stress : Patient declined   Housing Stability: Patient Declined (2025)    Housing Stability Vital Sign     Unable to Pay for Housing in the Last Year: Patient declined     Homeless in the Last Year: Patient declined       Precautions:     Allergies as of 2024    (No Known Allergies)       WO Assessment Details/Treatment     Narrative: Seen patient for initiation of preventative  skin care measures    Patient up in chair, States has no skin issues.   Christiano score 21    Consult wound care for any skin issues         01/09/2025

## 2025-01-09 NOTE — HPI
45 yo F underwent robotic hysterectomy with BSO, KE and bladder repair today.  Patient with known HTN and currently with elevated BP.   Patient is doing well.  No complaints of pain.  She is asking how long she will need to keep the catheter in place.  Will monitor UOP overnight.  No N/V.  She does use a CPAP at home but says she is comfortable with the nasal cannula but will need to monitor closely for CO2 retention and restart CPAP tomorrow if no N/V tonight.      Remainder of ROS as below.  See assessment and plan below for problem based evaluation

## 2025-01-09 NOTE — PROGRESS NOTES
Patient examined and chart reviewed. We met and spoke about the surgery yesterday and plan to maintain the drain and the catheter. She reports intermittent severe pain and we discussed that this is expected after surgery. The drain and the catheter look normal. Plan to continue antibiotics and maximal drainage. Plan to check a drain creatinine tomorrow.

## 2025-01-09 NOTE — ASSESSMENT & PLAN NOTE
Holding CPAP at present due to abd surgery and potential for N/V and aspiration.    Will monitor for CO2 retention and recommend not over oxygenating and decreasing respiratory drive.

## 2025-01-09 NOTE — ASSESSMENT & PLAN NOTE
Patient's blood pressure range in the last 24 hours was: BP  Min: 134/116  Max: 196/103.The patient's inpatient anti-hypertensive regimen is listed below:  Current Antihypertensives  metoprolol succinate (TOPROL-XL) 24 hr tablet 50 mg, Daily, Oral  losartan tablet 100 mg, Daily, Oral  hydrALAZINE injection 10 mg, Every 6 hours PRN, Intravenous    Plan  - BP is uncontrolled, will adjust as follows:    - Restarted home antihypertensives with prn IV hydralazine as clinically indicated

## 2025-01-09 NOTE — PLAN OF CARE
Problem: Adult Inpatient Plan of Care  Goal: Plan of Care Review  Outcome: Progressing  Goal: Patient-Specific Goal (Individualized)  Outcome: Progressing  Goal: Absence of Hospital-Acquired Illness or Injury  Outcome: Progressing  Goal: Optimal Comfort and Wellbeing  Outcome: Progressing  Goal: Readiness for Transition of Care  Outcome: Progressing     Problem: Infection  Goal: Absence of Infection Signs and Symptoms  Outcome: Progressing     Problem: Wound  Goal: Optimal Coping  Outcome: Progressing  Goal: Optimal Functional Ability  Outcome: Progressing  Goal: Absence of Infection Signs and Symptoms  Outcome: Progressing  Goal: Improved Oral Intake  Outcome: Progressing  Goal: Optimal Pain Control and Function  Outcome: Progressing  Goal: Skin Health and Integrity  Outcome: Progressing  Goal: Optimal Wound Healing  Outcome: Progressing     Problem: Fall Injury Risk  Goal: Absence of Fall and Fall-Related Injury  Outcome: Progressing     Problem: Gas Exchange Impaired  Goal: Optimal Gas Exchange  Outcome: Progressing

## 2025-01-10 PROCEDURE — 99024 POSTOP FOLLOW-UP VISIT: CPT | Mod: GT,,, | Performed by: UROLOGY

## 2025-01-10 PROCEDURE — 11000001 HC ACUTE MED/SURG PRIVATE ROOM

## 2025-01-10 PROCEDURE — 99232 SBSQ HOSP IP/OBS MODERATE 35: CPT | Mod: ,,, | Performed by: HOSPITALIST

## 2025-01-10 PROCEDURE — 63600175 PHARM REV CODE 636 W HCPCS: Performed by: OBSTETRICS & GYNECOLOGY

## 2025-01-10 PROCEDURE — 25000003 PHARM REV CODE 250: Performed by: HOSPITALIST

## 2025-01-10 PROCEDURE — 25000003 PHARM REV CODE 250: Performed by: OBSTETRICS & GYNECOLOGY

## 2025-01-10 PROCEDURE — 99900035 HC TECH TIME PER 15 MIN (STAT)

## 2025-01-10 PROCEDURE — 94761 N-INVAS EAR/PLS OXIMETRY MLT: CPT

## 2025-01-10 RX ORDER — ROPIVACAINE HYDROCHLORIDE 5 MG/ML
INJECTION, SOLUTION EPIDURAL; INFILTRATION; PERINEURAL
Status: DISCONTINUED | OUTPATIENT
Start: 2025-01-08 | End: 2025-01-10

## 2025-01-10 RX ADMIN — FAMOTIDINE 20 MG: 20 TABLET, FILM COATED ORAL at 08:01

## 2025-01-10 RX ADMIN — IBUPROFEN 600 MG: 600 TABLET ORAL at 12:01

## 2025-01-10 RX ADMIN — IBUPROFEN 600 MG: 600 TABLET ORAL at 11:01

## 2025-01-10 RX ADMIN — POLYETHYLENE GLYCOL 3350 17 G: 17 POWDER, FOR SOLUTION ORAL at 08:01

## 2025-01-10 RX ADMIN — FAMOTIDINE 20 MG: 20 TABLET, FILM COATED ORAL at 09:01

## 2025-01-10 RX ADMIN — HYDROMORPHONE HYDROCHLORIDE 1 MG: 2 INJECTION, SOLUTION INTRAMUSCULAR; INTRAVENOUS; SUBCUTANEOUS at 08:01

## 2025-01-10 RX ADMIN — IBUPROFEN 600 MG: 600 TABLET ORAL at 05:01

## 2025-01-10 RX ADMIN — SODIUM CHLORIDE, POTASSIUM CHLORIDE, SODIUM LACTATE AND CALCIUM CHLORIDE: 600; 310; 30; 20 INJECTION, SOLUTION INTRAVENOUS at 10:01

## 2025-01-10 RX ADMIN — SODIUM CHLORIDE, POTASSIUM CHLORIDE, SODIUM LACTATE AND CALCIUM CHLORIDE: 600; 310; 30; 20 INJECTION, SOLUTION INTRAVENOUS at 02:01

## 2025-01-10 RX ADMIN — LOSARTAN POTASSIUM 100 MG: 100 TABLET, FILM COATED ORAL at 08:01

## 2025-01-10 RX ADMIN — HYDROMORPHONE HYDROCHLORIDE 1 MG: 2 INJECTION, SOLUTION INTRAMUSCULAR; INTRAVENOUS; SUBCUTANEOUS at 09:01

## 2025-01-10 RX ADMIN — DIPHENHYDRAMINE HYDROCHLORIDE 25 MG: 25 CAPSULE ORAL at 11:01

## 2025-01-10 RX ADMIN — MUPIROCIN: 20 OINTMENT TOPICAL at 09:01

## 2025-01-10 RX ADMIN — METOPROLOL SUCCINATE 50 MG: 50 TABLET, EXTENDED RELEASE ORAL at 08:01

## 2025-01-10 RX ADMIN — OXYCODONE AND ACETAMINOPHEN 1 TABLET: 10; 325 TABLET ORAL at 03:01

## 2025-01-10 RX ADMIN — IBUPROFEN 600 MG: 600 TABLET ORAL at 06:01

## 2025-01-10 RX ADMIN — CITALOPRAM HYDROBROMIDE 10 MG: 10 TABLET ORAL at 09:01

## 2025-01-10 RX ADMIN — CEFTRIAXONE 2 G: 2 INJECTION, POWDER, FOR SOLUTION INTRAMUSCULAR; INTRAVENOUS at 03:01

## 2025-01-10 RX ADMIN — OXYCODONE AND ACETAMINOPHEN 1 TABLET: 10; 325 TABLET ORAL at 12:01

## 2025-01-10 NOTE — PROGRESS NOTES
[facility]  Obstetrics & Gynecology  Progress Note    Patient Name: Haleigh Newman  MRN: [id number]  Admission Date: 1/8/2025  Primary Care Provider: Francisco Mayo DO  Principal Problem: Encounter for postoperative care    Subjective:     HPI:  No notes on file    Interval History: POD2 s/p XI ROBOTIC HYSTERECTOMY,WITH BILATERAL SALPINGECTOMY and OOPHORECTOMY,RIGHT OVARY, XI ROBOTIC LYSIS, ADHESIONS. Then by Urology: Exploratory laparotomy, open cystorrrhaphy, cystoscopy, and bilateral retrograde pyelography     Doing well this morning. No acute events overnight. Reports she has had a BM. Pain controlled with current regimen. Denies vaginal bleeding, fever, chills, nausea or vomiting.    Scheduled Meds:   cefTRIAXone (Rocephin) IV (PEDS and ADULTS)  2 g Intravenous Q24H    citalopram  10 mg Oral QHS    famotidine  20 mg Oral BID    ibuprofen  600 mg Oral Q6H    loperamide  4 mg Oral Once    losartan  100 mg Oral Daily    metoprolol succinate  50 mg Oral Daily    mupirocin   Nasal BID    polyethylene glycol  17 g Oral Daily     Continuous Infusions:   lactated ringers   Intravenous Continuous 125 mL/hr at 01/10/25 1041 New Bag at 01/10/25 1041    loperamide  2 mg Oral Continuous PRN         PRN Meds:  Current Facility-Administered Medications:     aluminum & magnesium hydroxide-simethicone, 30 mL, Oral, Q6H PRN    bisacodyL, 10 mg, Rectal, Daily PRN    diphenhydrAMINE, 25 mg, Oral, Q4H PRN    diphenhydrAMINE, 25 mg, Intravenous, Q4H PRN    hydrALAZINE, 10 mg, Intravenous, Q6H PRN    HYDROmorphone, 1 mg, Intravenous, Q6H PRN    lactulose, 20 g, Oral, Q6H PRN    loperamide, 2 mg, Oral, Continuous PRN    ondansetron, 8 mg, Oral, Q8H PRN    oxyCODONE-acetaminophen, 1 tablet, Oral, Q4H PRN    oxyCODONE-acetaminophen, 1 tablet, Oral, Q4H PRN    Review of patient's allergies indicates:  No Known Allergies    Objective:     Vital Signs (Most Recent):  Temp: 97.6 °F (36.4 °C) (01/10/25 0735)  Pulse: 70  (01/10/25 0735)  Resp: 18 (01/10/25 0806)  BP: 133/83 (01/10/25 0735)  SpO2: 95 % (01/10/25 0735) Vital Signs (24h Range):  Temp:  [97.6 °F (36.4 °C)-98.6 °F (37 °C)] 97.6 °F (36.4 °C)  Pulse:  [70-80] 70  Resp:  [16-19] 18  SpO2:  [94 %-99 %] 95 %  BP: (116-143)/(75-86) 133/83     Weight: 100.2 kg (221 lb)  Body mass index is 37.93 kg/m².  Patient's last menstrual period was 12/07/2024 (exact date).    I&O (Last 24H):    Intake/Output Summary (Last 24 hours) at 1/10/2025 1125  Last data filed at 1/10/2025 0758  Gross per 24 hour   Intake --   Output 930 ml   Net -930 ml         Laboratory:  Recent Lab Results       None          I have personallly reviewed all pertinent lab results from the last 24 hours.    Diagnostic Results:  Labs: Reviewed     Physical Exam:   Constitutional: She is oriented to person, place, and time. She appears well-developed and well-nourished.       Cardiovascular:  Normal rate.      Exam reveals no edema.        Pulmonary/Chest: Effort normal.        Abdominal: Soft. She exhibits abdominal incision. She exhibits no distension. There is abdominal tenderness.             Musculoskeletal: Normal range of motion and moves all extremeties. No edema.       Neurological: She is alert and oriented to person, place, and time.    Skin: Skin is warm and dry.    Psychiatric: She has a normal mood and affect.        Review of Systems   All other systems reviewed and are negative.      Assessment/Plan:     * Encounter for postoperative care  Meeting all postop milestones    Intraoperative bladder injury  S/p repair by urology  Quintero in place with adequate UOP  JUNITO drain in place  Rocephin 2g IV qd   Management per urology  Awaiting final dispo from urology    Essential (primary) hypertension  Medicine consulted, appreciate murali Pierre, DO  Obstetrics & Gynecology  [facility]

## 2025-01-10 NOTE — ASSESSMENT & PLAN NOTE
Patient's blood pressure range in the last 24 hours was: BP  Min: 114/80  Max: 145/84.The patient's inpatient anti-hypertensive regimen is listed below:  Current Antihypertensives  metoprolol succinate (TOPROL-XL) 24 hr tablet 50 mg, Daily, Oral  losartan tablet 100 mg, Daily, Oral  hydrALAZINE injection 10 mg, Every 6 hours PRN, Intravenous    Plan  - BP is uncontrolled, will adjust as follows:    - Restarted home antihypertensives with prn IV hydralazine as clinically indicated    1/9: Blood pressure better controlled.

## 2025-01-10 NOTE — ASSESSMENT & PLAN NOTE
S/p repair by urology  Quintero in place with adequate UOP  JUNITO drain in place  Rocephin 2g IV qd   Management per urology  Awaiting final dispo from urology

## 2025-01-10 NOTE — SUBJECTIVE & OBJECTIVE
Interval History:     Review of Systems   Constitutional:  Negative for appetite change, chills, fatigue, fever and unexpected weight change.   HENT:  Negative for congestion, mouth sores, nosebleeds, sinus pain, sore throat and trouble swallowing.    Respiratory:  Negative for apnea, cough, chest tightness and shortness of breath.    Cardiovascular:  Negative for chest pain, palpitations and leg swelling.   Gastrointestinal:  Negative for blood in stool, constipation, diarrhea, nausea and vomiting.        Abd pain appropriate post op   Genitourinary:  Negative for decreased urine volume, difficulty urinating, dysuria and frequency.   Musculoskeletal:  Negative for arthralgias, back pain and neck pain.   Skin:  Negative for rash.   Neurological:  Negative for syncope, light-headedness and headaches.   Hematological:  Does not bruise/bleed easily.   Psychiatric/Behavioral:  Negative for confusion and suicidal ideas.      Objective:     Vital Signs (Most Recent):  Temp: 97.8 °F (36.6 °C) (01/09/25 1944)  Pulse: 79 (01/09/25 1944)  Resp: 18 (01/09/25 1944)  BP: 139/76 (01/09/25 1944)  SpO2: 95 % (01/09/25 1944) Vital Signs (24h Range):  Temp:  [97.8 °F (36.6 °C)-98.8 °F (37.1 °C)] 97.8 °F (36.6 °C)  Pulse:  [76-86] 79  Resp:  [16-19] 18  SpO2:  [95 %-99 %] 95 %  BP: (114-145)/(76-86) 139/76     Weight: 100.2 kg (221 lb)  Body mass index is 37.93 kg/m².    Intake/Output Summary (Last 24 hours) at 1/9/2025 1956  Last data filed at 1/9/2025 1728  Gross per 24 hour   Intake --   Output 1790 ml   Net -1790 ml         Physical Exam  Vitals and nursing note reviewed. Exam conducted with a chaperone present.   Constitutional:       Appearance: Normal appearance.   HENT:      Head: Atraumatic.      Mouth/Throat:      Mouth: Mucous membranes are moist.      Pharynx: Oropharynx is clear.   Eyes:      Conjunctiva/sclera: Conjunctivae normal.      Pupils: Pupils are equal, round, and reactive to light.   Cardiovascular:      Rate  and Rhythm: Normal rate and regular rhythm.      Pulses: Normal pulses.      Heart sounds: Normal heart sounds.   Pulmonary:      Effort: Pulmonary effort is normal.      Breath sounds: Normal breath sounds.   Abdominal:      General: Bowel sounds are normal. There is no distension.      Tenderness: There is abdominal tenderness. There is no guarding or rebound.   Musculoskeletal:         General: Normal range of motion.      Right lower leg: No edema.      Left lower leg: No edema.   Skin:     General: Skin is warm and dry.      Coloration: Skin is not jaundiced or pale.      Findings: No bruising, lesion or rash.   Neurological:      General: No focal deficit present.      Mental Status: She is alert and oriented to person, place, and time.   Psychiatric:         Mood and Affect: Mood normal.             Significant Labs: All pertinent labs within the past 24 hours have been reviewed.    Significant Imaging: I have reviewed all pertinent imaging results/findings within the past 24 hours.

## 2025-01-10 NOTE — ANESTHESIA POSTPROCEDURE EVALUATION
Anesthesia Post Evaluation    Patient: Haleigh Newman    Procedure(s) Performed: Procedure(s) (LRB):  XI ROBOTIC HYSTERECTOMY,WITH BILATERAL SALPINGO-OOPHORECTOMY,RIGHT OVARY (Bilateral)  XI ROBOTIC LYSIS, ADHESIONS  REPAIR, BLADDER.OPEN  LAPAROTOMY, EXPLORATORY  CYSTOSCOPY, WITH RETROGRADE PYELOGRAM (Bilateral)    Final Anesthesia Type: general      Patient location during evaluation: PACU  Patient participation: Yes- Able to Participate  Level of consciousness: awake and alert  Post-procedure vital signs: reviewed and stable  Pain management: adequate  Airway patency: patent  SUDHEER mitigation strategies: Multimodal analgesia  PONV status at discharge: No PONV  Anesthetic complications: no      Cardiovascular status: blood pressure returned to baseline  Respiratory status: unassisted  Hydration status: euvolemic  Follow-up not needed.              Vitals Value Taken Time   /83 01/10/25 0735   Temp 36.4 °C (97.6 °F) 01/10/25 0735   Pulse 70 01/10/25 0735   Resp 18 01/10/25 0806   SpO2 95 % 01/10/25 0735         Event Time   Out of Recovery 15:07:00         Pain/Louisa Score: Pain Rating Prior to Med Admin: 8 (1/10/2025  8:06 AM)  Pain Rating Post Med Admin: 7 (1/10/2025  7:25 AM)

## 2025-01-10 NOTE — PROGRESS NOTES
[facility]  [facility] Medicine  Progress Note    Patient Name: Haleigh Newman  MRN: [id number]  Patient Class: IP- Inpatient   Admission Date: 1/8/2025  Length of Stay: 1 days  Attending Physician: Dawood Ma MD  Primary Care Provider: Michael Duran DO        Subjective     Principal Problem:Encounter for postoperative care    HPI:  45 yo F underwent robotic hysterectomy with BSO, KE and bladder repair today.  Patient with known HTN and currently with elevated BP.   Patient is doing well.  No complaints of pain.  She is asking how long she will need to keep the catheter in place.  Will monitor UOP overnight.  No N/V.  She does use a CPAP at home but says she is comfortable with the nasal cannula but will need to monitor closely for CO2 retention and restart CPAP tomorrow if no N/V tonight.      Remainder of ROS as below.  See assessment and plan below for problem based evaluation      Overview/Hospital Course:  No notes on file    Interval History:     Review of Systems   Constitutional:  Negative for appetite change, chills, fatigue, fever and unexpected weight change.   HENT:  Negative for congestion, mouth sores, nosebleeds, sinus pain, sore throat and trouble swallowing.    Respiratory:  Negative for apnea, cough, chest tightness and shortness of breath.    Cardiovascular:  Negative for chest pain, palpitations and leg swelling.   Gastrointestinal:  Negative for blood in stool, constipation, diarrhea, nausea and vomiting.        Abd pain appropriate post op   Genitourinary:  Negative for decreased urine volume, difficulty urinating, dysuria and frequency.   Musculoskeletal:  Negative for arthralgias, back pain and neck pain.   Skin:  Negative for rash.   Neurological:  Negative for syncope, light-headedness and headaches.   Hematological:  Does not bruise/bleed easily.   Psychiatric/Behavioral:  Negative for confusion and suicidal ideas.      Objective:     Vital Signs (Most  Recent):  Temp: 97.8 °F (36.6 °C) (01/09/25 1944)  Pulse: 79 (01/09/25 1944)  Resp: 18 (01/09/25 1944)  BP: 139/76 (01/09/25 1944)  SpO2: 95 % (01/09/25 1944) Vital Signs (24h Range):  Temp:  [97.8 °F (36.6 °C)-98.8 °F (37.1 °C)] 97.8 °F (36.6 °C)  Pulse:  [76-86] 79  Resp:  [16-19] 18  SpO2:  [95 %-99 %] 95 %  BP: (114-145)/(76-86) 139/76     Weight: 100.2 kg (221 lb)  Body mass index is 37.93 kg/m².    Intake/Output Summary (Last 24 hours) at 1/9/2025 1956  Last data filed at 1/9/2025 1728  Gross per 24 hour   Intake --   Output 1790 ml   Net -1790 ml         Physical Exam  Vitals and nursing note reviewed. Exam conducted with a chaperone present.   Constitutional:       Appearance: Normal appearance.   HENT:      Head: Atraumatic.      Mouth/Throat:      Mouth: Mucous membranes are moist.      Pharynx: Oropharynx is clear.   Eyes:      Conjunctiva/sclera: Conjunctivae normal.      Pupils: Pupils are equal, round, and reactive to light.   Cardiovascular:      Rate and Rhythm: Normal rate and regular rhythm.      Pulses: Normal pulses.      Heart sounds: Normal heart sounds.   Pulmonary:      Effort: Pulmonary effort is normal.      Breath sounds: Normal breath sounds.   Abdominal:      General: Bowel sounds are normal. There is no distension.      Tenderness: There is abdominal tenderness. There is no guarding or rebound.   Musculoskeletal:         General: Normal range of motion.      Right lower leg: No edema.      Left lower leg: No edema.   Skin:     General: Skin is warm and dry.      Coloration: Skin is not jaundiced or pale.      Findings: No bruising, lesion or rash.   Neurological:      General: No focal deficit present.      Mental Status: She is alert and oriented to person, place, and time.   Psychiatric:         Mood and Affect: Mood normal.             Significant Labs: All pertinent labs within the past 24 hours have been reviewed.    Significant Imaging: I have reviewed all pertinent imaging  results/findings within the past 24 hours.    Assessment and Plan     * Encounter for postoperative care  Defer to primary surgical team.    Agree with pain control, DVT prophylaxis and rehab.         Anxiety with depression  Patient works night shift and wants to continue her celexa qhs.          SUDHEER on CPAP  Holding CPAP at present due to abd surgery and potential for N/V and aspiration.    Will monitor for CO2 retention and recommend not over oxygenating and decreasing respiratory drive.        Migraine without status migrainosus, not intractable  Patient with prn triptan as OP   Will hold at this time due to uncontrolled HTN      Essential (primary) hypertension  Patient's blood pressure range in the last 24 hours was: BP  Min: 114/80  Max: 145/84.The patient's inpatient anti-hypertensive regimen is listed below:  Current Antihypertensives  metoprolol succinate (TOPROL-XL) 24 hr tablet 50 mg, Daily, Oral  losartan tablet 100 mg, Daily, Oral  hydrALAZINE injection 10 mg, Every 6 hours PRN, Intravenous    Plan  - BP is uncontrolled, will adjust as follows:    - Restarted home antihypertensives with prn IV hydralazine as clinically indicated    1/9: Blood pressure better controlled.        VTE Risk Mitigation (From admission, onward)      None            Discharge Planning   FRANNY: 1/11/2025     Code Status: Full Code   Medical Readiness for Discharge Date: 1/9/2025               Gerardo Ibarra MD  Department of Hospital Medicine   [facility]

## 2025-01-10 NOTE — ANESTHESIA PROCEDURE NOTES
Peripheral Block    Patient location during procedure: OR   Block not for primary anesthetic.  Reason for block: at surgeon's request and post-op pain management   Post-op Pain Location: abdominal pain   Start time: 1/8/2025 1:21 PM  Timeout: 1/8/2025 1:20 PM   End time: 1/8/2025 1:24 PM    Staffing  Authorizing Provider: James Bailey MD  Performing Provider: James Bailey MD    Staffing  Performed by: James Bailey MD  Authorized by: James Bailey MD    Preanesthetic Checklist  Completed: patient identified, IV checked, site marked, risks and benefits discussed, surgical consent, monitors and equipment checked, pre-op evaluation and timeout performed  Peripheral Block  Patient position: supine  Prep: ChloraPrep  Patient monitoring: heart rate, continuous pulse ox, continuous capnometry, frequent blood pressure checks and cardiac monitor  Block type: TAP  Laterality: bilateral  Injection technique: single shot  Needle  Needle type: Echogenic   Needle gauge: 22 G  Needle length: 3.5 in  Needle localization: anatomical landmarks and ultrasound guidance   -ultrasound image captured on disc.  Assessment  Injection assessment: negative aspiration and negative parasthesia  Paresthesia pain: none  Heart rate change: no  Slow fractionated injection: yes  Pain Tolerance: comfortable throughout block  Medications:    Medications: ROPIvacaine (NAROPIN) injection 0.5% - Perineural   30 mL - 1/8/2025 1:23:00 PM

## 2025-01-10 NOTE — SUBJECTIVE & OBJECTIVE
Interval History: POD2 s/p XI ROBOTIC HYSTERECTOMY,WITH BILATERAL SALPINGECTOMY and OOPHORECTOMY,RIGHT OVARY, XI ROBOTIC LYSIS, ADHESIONS. Then by Urology: Exploratory laparotomy, open cystorrrhaphy, cystoscopy, and bilateral retrograde pyelography     Doing well this morning. No acute events overnight. Reports she has had a BM. Pain controlled with current regimen. Ambulating well, tolerating dietDenies vaginal bleeding, fever, chills, nausea or vomiting.    Scheduled Meds:   cefTRIAXone (Rocephin) IV (PEDS and ADULTS)  2 g Intravenous Q24H    citalopram  10 mg Oral QHS    famotidine  20 mg Oral BID    ibuprofen  600 mg Oral Q6H    loperamide  4 mg Oral Once    losartan  100 mg Oral Daily    metoprolol succinate  50 mg Oral Daily    mupirocin   Nasal BID    polyethylene glycol  17 g Oral Daily     Continuous Infusions:   lactated ringers   Intravenous Continuous 125 mL/hr at 01/10/25 1041 New Bag at 01/10/25 1041    loperamide  2 mg Oral Continuous PRN         PRN Meds:  Current Facility-Administered Medications:     aluminum & magnesium hydroxide-simethicone, 30 mL, Oral, Q6H PRN    bisacodyL, 10 mg, Rectal, Daily PRN    diphenhydrAMINE, 25 mg, Oral, Q4H PRN    diphenhydrAMINE, 25 mg, Intravenous, Q4H PRN    hydrALAZINE, 10 mg, Intravenous, Q6H PRN    HYDROmorphone, 1 mg, Intravenous, Q6H PRN    lactulose, 20 g, Oral, Q6H PRN    loperamide, 2 mg, Oral, Continuous PRN    ondansetron, 8 mg, Oral, Q8H PRN    oxyCODONE-acetaminophen, 1 tablet, Oral, Q4H PRN    oxyCODONE-acetaminophen, 1 tablet, Oral, Q4H PRN    Review of patient's allergies indicates:  No Known Allergies    Objective:     Vital Signs (Most Recent):  Temp: 97.6 °F (36.4 °C) (01/10/25 0735)  Pulse: 70 (01/10/25 0735)  Resp: 18 (01/10/25 0806)  BP: 133/83 (01/10/25 0735)  SpO2: 95 % (01/10/25 0735) Vital Signs (24h Range):  Temp:  [97.6 °F (36.4 °C)-98.6 °F (37 °C)] 97.6 °F (36.4 °C)  Pulse:  [70-80] 70  Resp:  [16-19] 18  SpO2:  [94 %-99 %] 95 %  BP:  (116-143)/(75-86) 133/83     Weight: 100.2 kg (221 lb)  Body mass index is 37.93 kg/m².  Patient's last menstrual period was 12/07/2024 (exact date).    I&O (Last 24H):    Intake/Output Summary (Last 24 hours) at 1/10/2025 1125  Last data filed at 1/10/2025 0758  Gross per 24 hour   Intake --   Output 930 ml   Net -930 ml         Laboratory:  Recent Lab Results       None          I have personallly reviewed all pertinent lab results from the last 24 hours.    Diagnostic Results:  Labs: Reviewed     Physical Exam:   Constitutional: She is oriented to person, place, and time. She appears well-developed and well-nourished.       Cardiovascular:  Normal rate.      Exam reveals no edema.        Pulmonary/Chest: Effort normal.        Abdominal: Soft. She exhibits abdominal incision. She exhibits no distension. There is abdominal tenderness.             Musculoskeletal: Normal range of motion and moves all extremeties. No edema.       Neurological: She is alert and oriented to person, place, and time.    Skin: Skin is warm and dry.    Psychiatric: She has a normal mood and affect.        Review of Systems   All other systems reviewed and are negative.

## 2025-01-10 NOTE — PROGRESS NOTES
I was present and agreed with the resident's assessment and plan.   Cardiac/Vascular      Secondary hypertension due to uncontrolled SUDHEER       M:Pt has hx of HTN for which she takes losartan 100 mg and metoprolol 50 mg.  Pt has been taking her friends clonidine as her BP has been elevated.  Pt has hx of SUDHEER and has not adhered to using her CPAP recently.     E:Physical exam normal.     A:Likely secondary HTN to untreated SUDHEER     T:Pt encouraged to adhere to her CPAP machine    her BP will likely improve as her sleep quality does.  Pt verbalized understanding and states that she will resume her CPAP tonight.  Pt to follow up in 3 months or earlier as needed.                Orthopedic     Knee pain - Primary       M:Right knee pain for the past 2 weeks. Previous episodes of knee pain.  Has been icing and elevating with little improvement.  No medicines taken.  Been active and standing a lot more frequently.     E:Diffuse, mild tenderness in right knee.  E:Previous xray showed mild OA on medial aspect     A:Likely OA exacerbated by increased activity and overuse from standing.     T:continue RICE treatments    I will prescribe naproxen 500 mg for the tenderness pain inflammation  She can take it up to 2 times a day and must avoid ibuprofen and other NSAIDs at the same time.  Pt to follow up in 3 months or earlier as needed.                    Intractable chronic migraine with aura and without status migrainosus         Relevant Medications     sumatriptan (IMITREX) 100 MG tablet                Follow up in about 3 months (around 3/5/2025).     Francisco Schedule, DO Lacy Spears D.O.

## 2025-01-11 PROBLEM — Z90.710 S/P ABDOMINAL HYSTERECTOMY: Status: ACTIVE | Noted: 2025-01-08

## 2025-01-11 PROBLEM — Z48.89 ENCOUNTER FOR POSTOPERATIVE CARE: Status: ACTIVE | Noted: 2025-01-11

## 2025-01-11 LAB
ANION GAP SERPL CALCULATED.3IONS-SCNC: 10 MMOL/L (ref 7–16)
BASOPHILS # BLD AUTO: 0.03 K/UL (ref 0–0.2)
BASOPHILS NFR BLD AUTO: 0.4 % (ref 0–1)
BUN SERPL-MCNC: 6 MG/DL (ref 7–19)
BUN/CREAT SERPL: 8 (ref 6–20)
CALCIUM SERPL-MCNC: 7.7 MG/DL (ref 8.4–10.2)
CHLORIDE SERPL-SCNC: 106 MMOL/L (ref 98–107)
CO2 SERPL-SCNC: 26 MMOL/L (ref 22–29)
CREAT FLD-MCNC: 0.7 MG/DL
CREAT SERPL-MCNC: 0.71 MG/DL (ref 0.55–1.02)
DIFFERENTIAL METHOD BLD: ABNORMAL
EGFR (NO RACE VARIABLE) (RUSH/TITUS): 108 ML/MIN/1.73M2
EOSINOPHIL # BLD AUTO: 0.24 K/UL (ref 0–0.5)
EOSINOPHIL NFR BLD AUTO: 3 % (ref 1–4)
ERYTHROCYTE [DISTWIDTH] IN BLOOD BY AUTOMATED COUNT: 13.8 % (ref 11.5–14.5)
GLUCOSE SERPL-MCNC: 84 MG/DL (ref 74–100)
HCT VFR BLD AUTO: 33.2 % (ref 38–47)
HGB BLD-MCNC: 10.2 G/DL (ref 12–16)
IMM GRANULOCYTES # BLD AUTO: 0.02 K/UL (ref 0–0.04)
IMM GRANULOCYTES NFR BLD: 0.2 % (ref 0–0.4)
LYMPHOCYTES # BLD AUTO: 1.59 K/UL (ref 1–4.8)
LYMPHOCYTES NFR BLD AUTO: 19.8 % (ref 27–41)
MCH RBC QN AUTO: 28.7 PG (ref 27–31)
MCHC RBC AUTO-ENTMCNC: 30.7 G/DL (ref 32–36)
MCV RBC AUTO: 93.5 FL (ref 80–96)
MONOCYTES # BLD AUTO: 0.42 K/UL (ref 0–0.8)
MONOCYTES NFR BLD AUTO: 5.2 % (ref 2–6)
MPC BLD CALC-MCNC: 12.4 FL (ref 9.4–12.4)
NEUTROPHILS # BLD AUTO: 5.73 K/UL (ref 1.8–7.7)
NEUTROPHILS NFR BLD AUTO: 71.4 % (ref 53–65)
NRBC # BLD AUTO: 0 X10E3/UL
NRBC, AUTO (.00): 0 %
PLATELET # BLD AUTO: 227 K/UL (ref 150–400)
POTASSIUM SERPL-SCNC: 4.1 MMOL/L (ref 3.5–5.1)
RBC # BLD AUTO: 3.55 M/UL (ref 4.2–5.4)
SODIUM SERPL-SCNC: 138 MMOL/L (ref 136–145)
WBC # BLD AUTO: 8.03 K/UL (ref 4.5–11)

## 2025-01-11 PROCEDURE — 25000003 PHARM REV CODE 250: Performed by: OBSTETRICS & GYNECOLOGY

## 2025-01-11 PROCEDURE — 11000001 HC ACUTE MED/SURG PRIVATE ROOM

## 2025-01-11 PROCEDURE — 85025 COMPLETE CBC W/AUTO DIFF WBC: CPT | Performed by: HOSPITALIST

## 2025-01-11 PROCEDURE — 99233 SBSQ HOSP IP/OBS HIGH 50: CPT | Mod: ,,, | Performed by: HOSPITALIST

## 2025-01-11 PROCEDURE — 63600175 PHARM REV CODE 636 W HCPCS: Mod: TB | Performed by: OBSTETRICS & GYNECOLOGY

## 2025-01-11 PROCEDURE — 99900035 HC TECH TIME PER 15 MIN (STAT)

## 2025-01-11 PROCEDURE — 94761 N-INVAS EAR/PLS OXIMETRY MLT: CPT

## 2025-01-11 PROCEDURE — 36415 COLL VENOUS BLD VENIPUNCTURE: CPT | Performed by: HOSPITALIST

## 2025-01-11 PROCEDURE — 80048 BASIC METABOLIC PNL TOTAL CA: CPT | Performed by: HOSPITALIST

## 2025-01-11 PROCEDURE — 82570 ASSAY OF URINE CREATININE: CPT | Performed by: UROLOGY

## 2025-01-11 PROCEDURE — 25000003 PHARM REV CODE 250: Performed by: HOSPITALIST

## 2025-01-11 RX ORDER — CEFDINIR 300 MG/1
300 CAPSULE ORAL 2 TIMES DAILY
Status: DISCONTINUED | OUTPATIENT
Start: 2025-01-11 | End: 2025-01-14 | Stop reason: HOSPADM

## 2025-01-11 RX ORDER — CEFDINIR 300 MG/1
300 CAPSULE ORAL EVERY 12 HOURS
Status: DISCONTINUED | OUTPATIENT
Start: 2025-01-11 | End: 2025-01-11

## 2025-01-11 RX ADMIN — MUPIROCIN: 20 OINTMENT TOPICAL at 09:01

## 2025-01-11 RX ADMIN — OXYCODONE AND ACETAMINOPHEN 1 TABLET: 10; 325 TABLET ORAL at 12:01

## 2025-01-11 RX ADMIN — FAMOTIDINE 20 MG: 20 TABLET, FILM COATED ORAL at 09:01

## 2025-01-11 RX ADMIN — HYDROMORPHONE HYDROCHLORIDE 1 MG: 2 INJECTION, SOLUTION INTRAMUSCULAR; INTRAVENOUS; SUBCUTANEOUS at 09:01

## 2025-01-11 RX ADMIN — LOSARTAN POTASSIUM 100 MG: 100 TABLET, FILM COATED ORAL at 09:01

## 2025-01-11 RX ADMIN — SODIUM CHLORIDE, POTASSIUM CHLORIDE, SODIUM LACTATE AND CALCIUM CHLORIDE: 600; 310; 30; 20 INJECTION, SOLUTION INTRAVENOUS at 09:01

## 2025-01-11 RX ADMIN — HYDROMORPHONE HYDROCHLORIDE 1 MG: 2 INJECTION, SOLUTION INTRAMUSCULAR; INTRAVENOUS; SUBCUTANEOUS at 05:01

## 2025-01-11 RX ADMIN — OXYCODONE AND ACETAMINOPHEN 1 TABLET: 10; 325 TABLET ORAL at 05:01

## 2025-01-11 RX ADMIN — LACTULOSE 20 G: 20 SOLUTION ORAL at 09:01

## 2025-01-11 RX ADMIN — CEFDINIR 300 MG: 300 CAPSULE ORAL at 09:01

## 2025-01-11 RX ADMIN — IBUPROFEN 600 MG: 600 TABLET ORAL at 12:01

## 2025-01-11 RX ADMIN — HYDROMORPHONE HYDROCHLORIDE 1 MG: 2 INJECTION, SOLUTION INTRAMUSCULAR; INTRAVENOUS; SUBCUTANEOUS at 03:01

## 2025-01-11 RX ADMIN — METOPROLOL SUCCINATE 50 MG: 50 TABLET, EXTENDED RELEASE ORAL at 09:01

## 2025-01-11 RX ADMIN — IBUPROFEN 600 MG: 600 TABLET ORAL at 05:01

## 2025-01-11 RX ADMIN — CITALOPRAM HYDROBROMIDE 10 MG: 10 TABLET ORAL at 09:01

## 2025-01-11 NOTE — ASSESSMENT & PLAN NOTE
Patient's blood pressure range in the last 24 hours was: BP  Min: 109/61  Max: 158/92.The patient's inpatient anti-hypertensive regimen is listed below:  Current Antihypertensives  metoprolol succinate (TOPROL-XL) 24 hr tablet 50 mg, Daily, Oral  losartan tablet 100 mg, Daily, Oral  hydrALAZINE injection 10 mg, Every 6 hours PRN, Intravenous    Plan  - BP is uncontrolled, will adjust as follows:    - Restarted home antihypertensives with prn IV hydralazine as clinically indicated    1/9: Blood pressure better controlled.    1/10: blood pressure controlled today.     1/11: continue with current plan.

## 2025-01-11 NOTE — PLAN OF CARE
Problem: Infection  Goal: Absence of Infection Signs and Symptoms  Outcome: Progressing  Intervention: Prevent or Manage Infection  Flowsheets (Taken 1/11/2025 1601)  Fever Reduction/Comfort Measures: aerosol temperature decreased  Infection Management: aseptic technique maintained  Isolation Precautions: precautions maintained     Problem: Wound  Goal: Optimal Coping  Outcome: Progressing  Intervention: Support Patient and Family Response  Flowsheets (Taken 1/11/2025 1601)  Supportive Measures:   active listening utilized   relaxation techniques promoted   self-care encouraged  Goal: Optimal Functional Ability  Outcome: Progressing  Intervention: Optimize Functional Ability  Flowsheets (Taken 1/11/2025 1601)  Activity Management: Ambulated to bathroom - L4  Goal: Absence of Infection Signs and Symptoms  Outcome: Progressing  Intervention: Prevent or Manage Infection  Flowsheets (Taken 1/11/2025 1601)  Fever Reduction/Comfort Measures: aerosol temperature decreased  Infection Management: aseptic technique maintained  Isolation Precautions: precautions maintained  Goal: Improved Oral Intake  Outcome: Progressing  Intervention: Promote and Optimize Oral Intake  Flowsheets (Taken 1/11/2025 1601)  Oral Nutrition Promotion:   social interaction promoted   rest periods promoted  Goal: Optimal Pain Control and Function  Outcome: Progressing  Intervention: Prevent or Manage Pain  Flowsheets (Taken 1/11/2025 1601)  Sleep/Rest Enhancement:   awakenings minimized   regular sleep/rest pattern promoted  Pain Management Interventions: around-the-clock dosing utilized  Goal: Skin Health and Integrity  Outcome: Progressing  Intervention: Optimize Skin Protection  Flowsheets (Taken 1/11/2025 1601)  Pressure Reduction Techniques:   frequent weight shift encouraged   weight shift assistance provided  Activity Management: Ambulated to bathroom - L4  Head of Bed (HOB) Positioning: HOB elevated  Goal: Optimal Wound Healing  Outcome:  Progressing  Intervention: Promote Wound Healing  Flowsheets (Taken 1/11/2025 1601)  Sleep/Rest Enhancement:   awakenings minimized   regular sleep/rest pattern promoted     Problem: Fall Injury Risk  Goal: Absence of Fall and Fall-Related Injury  Outcome: Progressing  Intervention: Identify and Manage Contributors  Flowsheets (Taken 1/11/2025 1601)  Self-Care Promotion:   independence encouraged   BADL personal objects within reach   BADL personal routines maintained   adaptive equipment use encouraged  Medication Review/Management: medications reviewed  Intervention: Promote Injury-Free Environment  Flowsheets (Taken 1/11/2025 1601)  Safety Promotion/Fall Prevention:   assistive device/personal item within reach   medications reviewed   high risk medications identified   nonskid shoes/socks when out of bed     Problem: Gas Exchange Impaired  Goal: Optimal Gas Exchange  Outcome: Progressing  Intervention: Optimize Oxygenation and Ventilation  Flowsheets (Taken 1/11/2025 1601)  Head of Bed (HOB) Positioning: HOB elevated

## 2025-01-11 NOTE — PROGRESS NOTES
[facility] Hospital Medicine  Progress Note    Patient Name: Haleigh Newman  MRN: [id number]  Patient Class: IP- Inpatient   Admission Date: 1/8/2025  Length of Stay: 3 days  Attending Physician: Dawood Ma MD  Primary Care Provider: Francisco Mayo DO        Subjective     Principal Problem:S/P abdominal hysterectomy    HPI:  43 yo F underwent robotic hysterectomy with BSO, KE and bladder repair today.  Patient with known HTN and currently with elevated BP.   Patient is doing well.  No complaints of pain.  She is asking how long she will need to keep the catheter in place.  Will monitor UOP overnight.  No N/V.  She does use a CPAP at home but says she is comfortable with the nasal cannula but will need to monitor closely for CO2 retention and restart CPAP tomorrow if no N/V tonight.      Remainder of ROS as below.  See assessment and plan below for problem based evaluation      Overview/Hospital Course:  No notes on file    Interval History:     Review of Systems   Constitutional:  Negative for appetite change, chills, fatigue, fever and unexpected weight change.   HENT:  Negative for congestion, mouth sores, nosebleeds, sinus pain, sore throat and trouble swallowing.    Respiratory:  Negative for apnea, cough, chest tightness and shortness of breath.    Cardiovascular:  Negative for chest pain, palpitations and leg swelling.   Gastrointestinal:  Negative for blood in stool, constipation, diarrhea, nausea and vomiting.        Abd pain appropriate post op   Genitourinary:  Negative for decreased urine volume, difficulty urinating, dysuria and frequency.   Musculoskeletal:  Negative for arthralgias, back pain and neck pain.   Skin:  Negative for rash.   Neurological:  Negative for syncope, light-headedness and headaches.   Hematological:  Does not bruise/bleed easily.   Psychiatric/Behavioral:  Negative for confusion and suicidal ideas.      Objective:     Vital Signs (Most Recent):  Temp:  98.2 °F (36.8 °C) (01/11/25 1214)  Pulse: 75 (01/11/25 1214)  Resp: 18 (01/11/25 1216)  BP: (!) 158/92 (01/11/25 1214)  SpO2: 98 % (01/11/25 1214) Vital Signs (24h Range):  Temp:  [97.7 °F (36.5 °C)-98.5 °F (36.9 °C)] 98.2 °F (36.8 °C)  Pulse:  [74-81] 75  Resp:  [16-18] 18  SpO2:  [95 %-98 %] 98 %  BP: (109-158)/(61-92) 158/92     Weight: 100.2 kg (221 lb)  Body mass index is 37.93 kg/m².    Intake/Output Summary (Last 24 hours) at 1/11/2025 1509  Last data filed at 1/11/2025 0740  Gross per 24 hour   Intake --   Output 1240 ml   Net -1240 ml         Physical Exam  Vitals and nursing note reviewed. Exam conducted with a chaperone present.   Constitutional:       Appearance: Normal appearance.   HENT:      Head: Atraumatic.      Mouth/Throat:      Mouth: Mucous membranes are moist.      Pharynx: Oropharynx is clear.   Eyes:      Conjunctiva/sclera: Conjunctivae normal.      Pupils: Pupils are equal, round, and reactive to light.   Cardiovascular:      Rate and Rhythm: Normal rate and regular rhythm.      Pulses: Normal pulses.      Heart sounds: Normal heart sounds.   Pulmonary:      Effort: Pulmonary effort is normal.      Breath sounds: Normal breath sounds.   Abdominal:      General: Bowel sounds are normal. There is no distension.      Tenderness: There is abdominal tenderness. There is no guarding or rebound.   Musculoskeletal:         General: Normal range of motion.      Right lower leg: No edema.      Left lower leg: No edema.   Skin:     General: Skin is warm and dry.      Coloration: Skin is not jaundiced or pale.      Findings: No bruising, lesion or rash.   Neurological:      General: No focal deficit present.      Mental Status: She is alert and oriented to person, place, and time.   Psychiatric:         Mood and Affect: Mood normal.             Significant Labs: All pertinent labs within the past 24 hours have been reviewed.    Significant Imaging: I have reviewed all pertinent imaging results/findings  within the past 24 hours.    Assessment and Plan     * S/P abdominal hysterectomy  Defer to primary surgical team.    Agree with pain control, DVT prophylaxis and rehab.     1/10: continue per ob/gyn.   1/11: mgmt per ob/gyn.        Anxiety with depression  Patient works night shift and wants to continue her celexa qhs.          SUDHEER on CPAP  Holding CPAP at present due to abd surgery and potential for N/V and aspiration.    Will monitor for CO2 retention and recommend not over oxygenating and decreasing respiratory drive.        Migraine without status migrainosus, not intractable  Patient with prn triptan as OP   Will hold at this time due to uncontrolled HTN      Essential (primary) hypertension  Patient's blood pressure range in the last 24 hours was: BP  Min: 109/61  Max: 158/92.The patient's inpatient anti-hypertensive regimen is listed below:  Current Antihypertensives  metoprolol succinate (TOPROL-XL) 24 hr tablet 50 mg, Daily, Oral  losartan tablet 100 mg, Daily, Oral  hydrALAZINE injection 10 mg, Every 6 hours PRN, Intravenous    Plan  - BP is uncontrolled, will adjust as follows:    - Restarted home antihypertensives with prn IV hydralazine as clinically indicated    1/9: Blood pressure better controlled.    1/10: blood pressure controlled today.     1/11: continue with current plan.       VTE Risk Mitigation (From admission, onward)      None            Discharge Planning   FRANNY: 1/11/2025     Code Status: Full Code   Medical Readiness for Discharge Date: 1/9/2025             Gerardo Ibarra MD  Department of Hospital Medicine   [facility]

## 2025-01-11 NOTE — SUBJECTIVE & OBJECTIVE
Interval History:     Review of Systems   Constitutional:  Negative for appetite change, chills, fatigue, fever and unexpected weight change.   HENT:  Negative for congestion, mouth sores, nosebleeds, sinus pain, sore throat and trouble swallowing.    Respiratory:  Negative for apnea, cough, chest tightness and shortness of breath.    Cardiovascular:  Negative for chest pain, palpitations and leg swelling.   Gastrointestinal:  Negative for blood in stool, constipation, diarrhea, nausea and vomiting.        Abd pain appropriate post op   Genitourinary:  Negative for decreased urine volume, difficulty urinating, dysuria and frequency.   Musculoskeletal:  Negative for arthralgias, back pain and neck pain.   Skin:  Negative for rash.   Neurological:  Negative for syncope, light-headedness and headaches.   Hematological:  Does not bruise/bleed easily.   Psychiatric/Behavioral:  Negative for confusion and suicidal ideas.      Objective:     Vital Signs (Most Recent):  Temp: 98.2 °F (36.8 °C) (01/11/25 1214)  Pulse: 75 (01/11/25 1214)  Resp: 18 (01/11/25 1216)  BP: (!) 158/92 (01/11/25 1214)  SpO2: 98 % (01/11/25 1214) Vital Signs (24h Range):  Temp:  [97.7 °F (36.5 °C)-98.5 °F (36.9 °C)] 98.2 °F (36.8 °C)  Pulse:  [74-81] 75  Resp:  [16-18] 18  SpO2:  [95 %-98 %] 98 %  BP: (109-158)/(61-92) 158/92     Weight: 100.2 kg (221 lb)  Body mass index is 37.93 kg/m².    Intake/Output Summary (Last 24 hours) at 1/11/2025 1509  Last data filed at 1/11/2025 0740  Gross per 24 hour   Intake --   Output 1240 ml   Net -1240 ml         Physical Exam  Vitals and nursing note reviewed. Exam conducted with a chaperone present.   Constitutional:       Appearance: Normal appearance.   HENT:      Head: Atraumatic.      Mouth/Throat:      Mouth: Mucous membranes are moist.      Pharynx: Oropharynx is clear.   Eyes:      Conjunctiva/sclera: Conjunctivae normal.      Pupils: Pupils are equal, round, and reactive to light.   Cardiovascular:       Rate and Rhythm: Normal rate and regular rhythm.      Pulses: Normal pulses.      Heart sounds: Normal heart sounds.   Pulmonary:      Effort: Pulmonary effort is normal.      Breath sounds: Normal breath sounds.   Abdominal:      General: Bowel sounds are normal. There is no distension.      Tenderness: There is abdominal tenderness. There is no guarding or rebound.   Musculoskeletal:         General: Normal range of motion.      Right lower leg: No edema.      Left lower leg: No edema.   Skin:     General: Skin is warm and dry.      Coloration: Skin is not jaundiced or pale.      Findings: No bruising, lesion or rash.   Neurological:      General: No focal deficit present.      Mental Status: She is alert and oriented to person, place, and time.   Psychiatric:         Mood and Affect: Mood normal.             Significant Labs: All pertinent labs within the past 24 hours have been reviewed.    Significant Imaging: I have reviewed all pertinent imaging results/findings within the past 24 hours.

## 2025-01-11 NOTE — NURSING
1200 I saw in the last note from OBGYN that the patient is supposed to be on IV abx and she is no longer on any. Secure chatted MD to be sure there wasn't supposed to be abx on board. States that she was supposed to get 2g x3 doses, so if she has had 3 doses, she is finished with abx. She did have the 2 + doses, so no more abx are needed.

## 2025-01-11 NOTE — SUBJECTIVE & OBJECTIVE
Interval History:     Review of Systems   Constitutional:  Negative for appetite change, chills, fatigue, fever and unexpected weight change.   HENT:  Negative for congestion, mouth sores, nosebleeds, sinus pain, sore throat and trouble swallowing.    Respiratory:  Negative for apnea, cough, chest tightness and shortness of breath.    Cardiovascular:  Negative for chest pain, palpitations and leg swelling.   Gastrointestinal:  Negative for blood in stool, constipation, diarrhea, nausea and vomiting.        Abd pain appropriate post op   Genitourinary:  Negative for decreased urine volume, difficulty urinating, dysuria and frequency.   Musculoskeletal:  Negative for arthralgias, back pain and neck pain.   Skin:  Negative for rash.   Neurological:  Negative for syncope, light-headedness and headaches.   Hematological:  Does not bruise/bleed easily.   Psychiatric/Behavioral:  Negative for confusion and suicidal ideas.      Objective:     Vital Signs (Most Recent):  Temp: 98 °F (36.7 °C) (01/10/25 1633)  Pulse: 79 (01/10/25 1633)  Resp: 18 (01/10/25 1633)  BP: (!) 145/84 (01/10/25 1633)  SpO2: 96 % (01/10/25 1633) Vital Signs (24h Range):  Temp:  [97.6 °F (36.4 °C)-98.3 °F (36.8 °C)] 98 °F (36.7 °C)  Pulse:  [70-79] 79  Resp:  [18-19] 18  SpO2:  [94 %-96 %] 96 %  BP: (116-145)/(75-84) 145/84     Weight: 100.2 kg (221 lb)  Body mass index is 37.93 kg/m².    Intake/Output Summary (Last 24 hours) at 1/10/2025 1840  Last data filed at 1/10/2025 1655  Gross per 24 hour   Intake --   Output 900 ml   Net -900 ml         Physical Exam  Vitals and nursing note reviewed. Exam conducted with a chaperone present.   Constitutional:       Appearance: Normal appearance.   HENT:      Head: Atraumatic.      Mouth/Throat:      Mouth: Mucous membranes are moist.      Pharynx: Oropharynx is clear.   Eyes:      Conjunctiva/sclera: Conjunctivae normal.      Pupils: Pupils are equal, round, and reactive to light.   Cardiovascular:      Rate  and Rhythm: Normal rate and regular rhythm.      Pulses: Normal pulses.      Heart sounds: Normal heart sounds.   Pulmonary:      Effort: Pulmonary effort is normal.      Breath sounds: Normal breath sounds.   Abdominal:      General: Bowel sounds are normal. There is no distension.      Tenderness: There is abdominal tenderness. There is no guarding or rebound.   Musculoskeletal:         General: Normal range of motion.      Right lower leg: No edema.      Left lower leg: No edema.   Skin:     General: Skin is warm and dry.      Coloration: Skin is not jaundiced or pale.      Findings: No bruising, lesion or rash.   Neurological:      General: No focal deficit present.      Mental Status: She is alert and oriented to person, place, and time.   Psychiatric:         Mood and Affect: Mood normal.             Significant Labs: All pertinent labs within the past 24 hours have been reviewed.    Significant Imaging: I have reviewed all pertinent imaging results/findings within the past 24 hours.

## 2025-01-11 NOTE — PROGRESS NOTES
Patient examined and chart reviewed.  Vitals are satisfactory.  Haleigh is sitting up at the bedside with her mom and they are talking.  She is encouraged and is in good spirits today.  She reports that she is eating better, drinking fluids and is having gas and had 2 bowel movements.  She reports that her pain is better controlled today.  On examination the abdomen is healing and the drain is in place and the Quintero catheter is in place draining yellow urine with small debris as expected.  We discussed plans to continue the drain and will plan to send the drain specimen for quantitative creatinine tomorrow morning.  We discussed plans to maintain the Quintero catheter in place until the bladder has healed and that we will continue antibiotics for an extended amount of time given risks of bacteriuria and risk of surgical site infection.  Urology will continue to follow and will help coordinate follow-up and treatment plans.

## 2025-01-11 NOTE — ASSESSMENT & PLAN NOTE
Date/Time of Note


Date/Time of Note


DATE: 9/27/17 


TIME: 12:17





Consult Date/Type/Reason


Admit Date/Time


Sep 19, 2017 at 23:18


Type of Consultation:  Neurology





Subjective


Comfortable





Objective


[id number]


Min Asist


 Vital Signs








  Date Time  Temp Pulse Resp B/P Pulse Ox O2 Delivery O2 Flow Rate FiO2


 


9/27/17 08:16 98.2 66 18 157/81 99   


 


9/26/17 08:00      Room Air  














 Intake and Output   


 


 9/26/17 9/26/17 9/27/17





 15:00 23:00 07:00


 


Intake Total  800 ml 240 ml


 


Output Total  600 ml 


 


Balance  200 ml 240 ml











Results/Medications


Medications





 Current Medications


Amlodipine Besylate (Norvasc) 5 mg DAILY PO  Last administered on 9/27/17at 09:

37; Admin Dose 5 MG;  Start 9/20/17 at 09:00


Lisinopril (Zestril) 40 mg DAILY PO  Last administered on 9/27/17at 09:36; 

Admin Dose 40 MG;  Start 9/20/17 at 09:00


Clonidine (Catapres) 0.2 mg Q8H  PRN PO SBP ABOVE 170mmHg;  Start 9/19/17 at 23:

45


Magnesium Hydroxide (Milk Of Mag) 30 ml DAILY  PRN PO CONSTIPATION Last 

administered on 9/23/17at 17:17; Admin Dose 30 ML;  Start 9/19/17 at 23:45


Miscellaneous Information 1 ea NOTE XX ;  Start 9/19/17 at 23:45


Glucose (Glutose) 15 gm Q15M  PRN PO DECREASED GLUCOSE;  Start 9/19/17 at 23:45


Glucose (Glutose) 22.5 gm Q15M  PRN PO DECREASED GLUCOSE;  Start 9/19/17 at 23:

45


Dextrose (D50w Syringe) 25 ml Q15M  PRN IV DECREASED GLUCOSE;  Start 9/19/17 at 

23:45


Dextrose (D50w Syringe) 50 ml Q15M  PRN IV DECREASED GLUCOSE;  Start 9/19/17 at 

23:45


Glucagon (Glucagen) 1 mg Q15M  PRN IM DECREASED GLUCOSE;  Start 9/19/17 at 23:45


Glucose (Glutose) 15 gm Q15M  PRN BUCCAL DECREASED GLUCOSE;  Start 9/19/17 at 23

:45


Acetaminophen (Tylenol Tab) 650 mg Q6H  PRN PO PAIN AND OR ELEVATED TEMP;  

Start 9/19/17 at 23:45


Atorvastatin Calcium (Lipitor) 10 mg DAILY@21 PO  Last administered on 9/26/ 17at 20:13; Admin Dose 10 MG;  Start 9/20/17 at 21:00


Docusate Sodium (Colace) 100 mg BID PO  Last administered on 9/27/17at 09:35; 

Admin Dose 100 MG;  Start 9/20/17 at 09:00


Senna (Senokot) 1 tab HS PO  Last administered on 9/26/17at 20:13; Admin Dose 1 

TAB;  Start 9/20/17 at 21:00


Magnesium Hydroxide (Milk Of Mag) 30 ml BID  PRN PO CONSTIPATION;  Start 9/20/ 17 at 00:00


Lactulose (Enulose) 20 gm DAILY  PRN PO CONSTIPATION Last administered on 9/24/ 17at 14:48; Admin Dose 20 GM;  Start 9/20/17 at 00:00


Bisacodyl (Dulcolax Supp) 10 mg DAILY  PRN [place] CONSTIPATION;  Start 9/20/17 at 00

:00


Acetaminophen (Tylenol Tab) 650 mg Q4H  PRN PO PAIN;  Start 9/20/17 at 00:00





Assessment/Plan


Additional Assessment/Plan


Rehab- Normal-pressure hydrocephalus, encephalopathy.


    Continue treatment plan, and schedule caregiver training


Hypertension.


Diabetes mellitus.


Hyperlipidemia.











ANTHONY ZHANG MD Sep 27, 2017 12:19 Patient's blood pressure range in the last 24 hours was: BP  Min: 116/78  Max: 145/84.The patient's inpatient anti-hypertensive regimen is listed below:  Current Antihypertensives  metoprolol succinate (TOPROL-XL) 24 hr tablet 50 mg, Daily, Oral  losartan tablet 100 mg, Daily, Oral  hydrALAZINE injection 10 mg, Every 6 hours PRN, Intravenous    Plan  - BP is uncontrolled, will adjust as follows:    - Restarted home antihypertensives with prn IV hydralazine as clinically indicated    1/9: Blood pressure better controlled.    1/10: blood pressure controlled today.

## 2025-01-11 NOTE — PROGRESS NOTES
[facility]  Obstetrics & Gynecology  Progress Note    Patient Name: Haleigh Newman  MRN: [id number]  Admission Date: 1/8/2025  Primary Care Provider: Francisco Mayo DO  Principal Problem: S/P abdominal hysterectomy    Subjective:     HPI:  No notes on file    Interval History: POD #3   s/p XI ROBOTIC HYSTERECTOMY,WITH BILATERAL SALPINGECTOMY and OOPHORECTOMY,RIGHT OVARY, XI ROBOTIC LYSIS, ADHESIONS. Then by Urology: Exploratory laparotomy, open cystorrrhaphy, cystoscopy, and bilateral retrograde pyelography     Patient is without complaints.  She reports having had several bowel movements.         Scheduled Meds:   cefdinir  300 mg Oral BID    citalopram  10 mg Oral QHS    famotidine  20 mg Oral BID    ibuprofen  600 mg Oral Q6H    loperamide  4 mg Oral Once    losartan  100 mg Oral Daily    metoprolol succinate  50 mg Oral Daily    mupirocin   Nasal BID    polyethylene glycol  17 g Oral Daily     Continuous Infusions:   loperamide  2 mg Oral Continuous PRN         PRN Meds:  Current Facility-Administered Medications:     aluminum & magnesium hydroxide-simethicone, 30 mL, Oral, Q6H PRN    bisacodyL, 10 mg, Rectal, Daily PRN    diphenhydrAMINE, 25 mg, Oral, Q4H PRN    diphenhydrAMINE, 25 mg, Intravenous, Q4H PRN    hydrALAZINE, 10 mg, Intravenous, Q6H PRN    HYDROmorphone, 1 mg, Intravenous, Q6H PRN    lactulose, 20 g, Oral, Q6H PRN    loperamide, 2 mg, Oral, Continuous PRN    ondansetron, 8 mg, Oral, Q8H PRN    oxyCODONE-acetaminophen, 1 tablet, Oral, Q4H PRN    oxyCODONE-acetaminophen, 1 tablet, Oral, Q4H PRN    Review of patient's allergies indicates:  No Known Allergies    Objective:     Vital Signs (Most Recent):  Temp: 98.6 °F (37 °C) (01/11/25 1637)  Pulse: 76 (01/11/25 1637)  Resp: 18 (01/11/25 1637)  BP: (!) 154/98 (01/11/25 1637)  SpO2: 95 % (01/11/25 1637) Vital Signs (24h Range):  Temp:  [97.7 °F (36.5 °C)-98.6 °F (37 °C)] 98.6 °F (37 °C)  Pulse:  [74-81] 76  Resp:  [16-18] 18  SpO2:   [95 %-98 %] 95 %  BP: (109-158)/(61-98) 154/98     Weight: 100.2 kg (221 lb)  Body mass index is 37.93 kg/m².  Patient's last menstrual period was 12/07/2024 (exact date).    I&O (Last 24H):    Intake/Output Summary (Last 24 hours) at 1/11/2025 1645  Last data filed at 1/11/2025 0740  Gross per 24 hour   Intake --   Output 1240 ml   Net -1240 ml         Laboratory:  Recent Lab Results         01/11/25  1338   01/11/25  0653        Anion Gap   10       Baso #   0.03       Basophil %   0.4       BUN   6       BUN/CREAT RATIO   8       Calcium   7.7       Chloride   106       CO2   26       Creatinine   0.71       Creatinine, Body Fluid 0.7  Comment: Please send drain fluid for creatinine  Reference ranges for this analyte have not been established for the body fluid specimen submitted for analysis.         Differential Method   Auto       eGFR   108       Eos #   0.24       Eos %   3.0       Glucose   84       Hematocrit   33.2       Hemoglobin   10.2       Immature Grans (Abs)   0.02       Immature Granulocytes   0.2       Lymph #   1.59       Lymph %   19.8       MCH   28.7       MCHC   30.7       MCV   93.5       Mono #   0.42       Mono %   5.2       MPV   12.4       Neutrophils, Abs   5.73       Neutrophils Relative   71.4       nRBC   0.0       NUCLEATED RBC ABSOLUTE   0.00       Platelet Count   227       Potassium   4.1       RBC   3.55       RDW   13.8       Sodium   138       WBC   8.03               Diagnostic Results:  Labs: Reviewed       Physical Exam:   Constitutional: She is oriented to person, place, and time. No distress.       Cardiovascular:  Normal rate.             Pulmonary/Chest: Effort normal. No respiratory distress.        Abdominal: She exhibits abdominal incision.                 Neurological: She is alert and oriented to person, place, and time.     Psychiatric: She has a normal mood and affect.        Review of Systems   All other systems reviewed and are negative.      Assessment/Plan:      * S/P abdominal hysterectomy  Meeting all postop milestones    Intraoperative bladder injury  S/p repair by urology  Quintero in place with adequate UOP  JUNITO drain in place  Rocephin 2g IV qd   Management per urology  Awaiting final dispo from urology    Essential (primary) hypertension  Medicine consulted, appreciate recs        Wan Brown MD  Obstetrics & Gynecology  [facility]

## 2025-01-11 NOTE — ASSESSMENT & PLAN NOTE
Defer to primary surgical team.    Agree with pain control, DVT prophylaxis and rehab.     1/10: continue per ob/gyn.   1/11: mgmt per ob/gyn.

## 2025-01-11 NOTE — PROGRESS NOTES
Patient examined and chart reviewed.  Vitals are satisfactory.  Urine is clear.  Jay intra-abdominal drain is draining slight blood-tinged fluid as expected.  I have started her on Omnicef 300 mg p.o. twice daily that she will remain on this until the drain is removed.  I have ordered a body fluid creatinine on the Jay drain and have asked the nurse to send this today.  Continue to advanced treatments and activities as tolerated.  Continue antibiotics which I have ordered.

## 2025-01-11 NOTE — ASSESSMENT & PLAN NOTE
Defer to primary surgical team.    Agree with pain control, DVT prophylaxis and rehab.     1/10: continue per ob/gyn.

## 2025-01-11 NOTE — SUBJECTIVE & OBJECTIVE
Interval History: POD #3   s/p XI ROBOTIC HYSTERECTOMY,WITH BILATERAL SALPINGECTOMY and OOPHORECTOMY,RIGHT OVARY, XI ROBOTIC LYSIS, ADHESIONS. Then by Urology: Exploratory laparotomy, open cystorrrhaphy, cystoscopy, and bilateral retrograde pyelography     Patient is without complaints.  She reports having had several bowel movements.         Scheduled Meds:   cefdinir  300 mg Oral BID    citalopram  10 mg Oral QHS    famotidine  20 mg Oral BID    ibuprofen  600 mg Oral Q6H    loperamide  4 mg Oral Once    losartan  100 mg Oral Daily    metoprolol succinate  50 mg Oral Daily    mupirocin   Nasal BID    polyethylene glycol  17 g Oral Daily     Continuous Infusions:   loperamide  2 mg Oral Continuous PRN         PRN Meds:  Current Facility-Administered Medications:     aluminum & magnesium hydroxide-simethicone, 30 mL, Oral, Q6H PRN    bisacodyL, 10 mg, Rectal, Daily PRN    diphenhydrAMINE, 25 mg, Oral, Q4H PRN    diphenhydrAMINE, 25 mg, Intravenous, Q4H PRN    hydrALAZINE, 10 mg, Intravenous, Q6H PRN    HYDROmorphone, 1 mg, Intravenous, Q6H PRN    lactulose, 20 g, Oral, Q6H PRN    loperamide, 2 mg, Oral, Continuous PRN    ondansetron, 8 mg, Oral, Q8H PRN    oxyCODONE-acetaminophen, 1 tablet, Oral, Q4H PRN    oxyCODONE-acetaminophen, 1 tablet, Oral, Q4H PRN    Review of patient's allergies indicates:  No Known Allergies    Objective:     Vital Signs (Most Recent):  Temp: 98.6 °F (37 °C) (01/11/25 1637)  Pulse: 76 (01/11/25 1637)  Resp: 18 (01/11/25 1637)  BP: (!) 154/98 (01/11/25 1637)  SpO2: 95 % (01/11/25 1637) Vital Signs (24h Range):  Temp:  [97.7 °F (36.5 °C)-98.6 °F (37 °C)] 98.6 °F (37 °C)  Pulse:  [74-81] 76  Resp:  [16-18] 18  SpO2:  [95 %-98 %] 95 %  BP: (109-158)/(61-98) 154/98     Weight: 100.2 kg (221 lb)  Body mass index is 37.93 kg/m².  Patient's last menstrual period was 12/07/2024 (exact date).    I&O (Last 24H):    Intake/Output Summary (Last 24 hours) at 1/11/2025 1643  Last data filed at 1/11/2025  0740  Gross per 24 hour   Intake --   Output 1240 ml   Net -1240 ml         Laboratory:  Recent Lab Results         01/11/25  1338   01/11/25  0653        Anion Gap   10       Baso #   0.03       Basophil %   0.4       BUN   6       BUN/CREAT RATIO   8       Calcium   7.7       Chloride   106       CO2   26       Creatinine   0.71       Creatinine, Body Fluid 0.7  Comment: Please send drain fluid for creatinine  Reference ranges for this analyte have not been established for the body fluid specimen submitted for analysis.         Differential Method   Auto       eGFR   108       Eos #   0.24       Eos %   3.0       Glucose   84       Hematocrit   33.2       Hemoglobin   10.2       Immature Grans (Abs)   0.02       Immature Granulocytes   0.2       Lymph #   1.59       Lymph %   19.8       MCH   28.7       MCHC   30.7       MCV   93.5       Mono #   0.42       Mono %   5.2       MPV   12.4       Neutrophils, Abs   5.73       Neutrophils Relative   71.4       nRBC   0.0       NUCLEATED RBC ABSOLUTE   0.00       Platelet Count   227       Potassium   4.1       RBC   3.55       RDW   13.8       Sodium   138       WBC   8.03               Diagnostic Results:  Labs: Reviewed       Physical Exam:   Constitutional: She is oriented to person, place, and time. No distress.       Cardiovascular:  Normal rate.             Pulmonary/Chest: Effort normal. No respiratory distress.        Abdominal: She exhibits abdominal incision.                 Neurological: She is alert and oriented to person, place, and time.     Psychiatric: She has a normal mood and affect.        Review of Systems   All other systems reviewed and are negative.

## 2025-01-11 NOTE — PROGRESS NOTES
[facility] Hospital Medicine  Progress Note    Patient Name: Haleigh Newman  MRN: [id number]  Patient Class: IP- Inpatient   Admission Date: 1/8/2025  Length of Stay: 2 days  Attending Physician: Dawood Ma MD  Primary Care Provider: Francisco Mayo DO    Subjective     Principal Problem:Encounter for postoperative care        HPI:  43 yo F underwent robotic hysterectomy with BSO, KE and bladder repair today.  Patient with known HTN and currently with elevated BP.   Patient is doing well.  No complaints of pain.  She is asking how long she will need to keep the catheter in place.  Will monitor UOP overnight.  No N/V.  She does use a CPAP at home but says she is comfortable with the nasal cannula but will need to monitor closely for CO2 retention and restart CPAP tomorrow if no N/V tonight.      Remainder of ROS as below.  See assessment and plan below for problem based evaluation      Overview/Hospital Course:  No notes on file    Interval History:     Review of Systems   Constitutional:  Negative for appetite change, chills, fatigue, fever and unexpected weight change.   HENT:  Negative for congestion, mouth sores, nosebleeds, sinus pain, sore throat and trouble swallowing.    Respiratory:  Negative for apnea, cough, chest tightness and shortness of breath.    Cardiovascular:  Negative for chest pain, palpitations and leg swelling.   Gastrointestinal:  Negative for blood in stool, constipation, diarrhea, nausea and vomiting.        Abd pain appropriate post op   Genitourinary:  Negative for decreased urine volume, difficulty urinating, dysuria and frequency.   Musculoskeletal:  Negative for arthralgias, back pain and neck pain.   Skin:  Negative for rash.   Neurological:  Negative for syncope, light-headedness and headaches.   Hematological:  Does not bruise/bleed easily.   Psychiatric/Behavioral:  Negative for confusion and suicidal ideas.      Objective:     Vital Signs (Most  Recent):  Temp: 98 °F (36.7 °C) (01/10/25 1633)  Pulse: 79 (01/10/25 1633)  Resp: 18 (01/10/25 1633)  BP: (!) 145/84 (01/10/25 1633)  SpO2: 96 % (01/10/25 1633) Vital Signs (24h Range):  Temp:  [97.6 °F (36.4 °C)-98.3 °F (36.8 °C)] 98 °F (36.7 °C)  Pulse:  [70-79] 79  Resp:  [18-19] 18  SpO2:  [94 %-96 %] 96 %  BP: (116-145)/(75-84) 145/84     Weight: 100.2 kg (221 lb)  Body mass index is 37.93 kg/m².    Intake/Output Summary (Last 24 hours) at 1/10/2025 1840  Last data filed at 1/10/2025 1655  Gross per 24 hour   Intake --   Output 900 ml   Net -900 ml         Physical Exam  Vitals and nursing note reviewed. Exam conducted with a chaperone present.   Constitutional:       Appearance: Normal appearance.   HENT:      Head: Atraumatic.      Mouth/Throat:      Mouth: Mucous membranes are moist.      Pharynx: Oropharynx is clear.   Eyes:      Conjunctiva/sclera: Conjunctivae normal.      Pupils: Pupils are equal, round, and reactive to light.   Cardiovascular:      Rate and Rhythm: Normal rate and regular rhythm.      Pulses: Normal pulses.      Heart sounds: Normal heart sounds.   Pulmonary:      Effort: Pulmonary effort is normal.      Breath sounds: Normal breath sounds.   Abdominal:      General: Bowel sounds are normal. There is no distension.      Tenderness: There is abdominal tenderness. There is no guarding or rebound.   Musculoskeletal:         General: Normal range of motion.      Right lower leg: No edema.      Left lower leg: No edema.   Skin:     General: Skin is warm and dry.      Coloration: Skin is not jaundiced or pale.      Findings: No bruising, lesion or rash.   Neurological:      General: No focal deficit present.      Mental Status: She is alert and oriented to person, place, and time.   Psychiatric:         Mood and Affect: Mood normal.             Significant Labs: All pertinent labs within the past 24 hours have been reviewed.    Significant Imaging: I have reviewed all pertinent imaging  results/findings within the past 24 hours.    Assessment and Plan     * Encounter for postoperative care  Defer to primary surgical team.    Agree with pain control, DVT prophylaxis and rehab.     1/10: continue per ob/gyn.         Anxiety with depression  Patient works night shift and wants to continue her celexa qhs.          SUDHEER on CPAP  Holding CPAP at present due to abd surgery and potential for N/V and aspiration.    Will monitor for CO2 retention and recommend not over oxygenating and decreasing respiratory drive.        Migraine without status migrainosus, not intractable  Patient with prn triptan as OP   Will hold at this time due to uncontrolled HTN      Essential (primary) hypertension  Patient's blood pressure range in the last 24 hours was: BP  Min: 116/78  Max: 145/84.The patient's inpatient anti-hypertensive regimen is listed below:  Current Antihypertensives  metoprolol succinate (TOPROL-XL) 24 hr tablet 50 mg, Daily, Oral  losartan tablet 100 mg, Daily, Oral  hydrALAZINE injection 10 mg, Every 6 hours PRN, Intravenous    Plan  - BP is uncontrolled, will adjust as follows:    - Restarted home antihypertensives with prn IV hydralazine as clinically indicated    1/9: Blood pressure better controlled.    1/10: blood pressure controlled today.         VTE Risk Mitigation (From admission, onward)      None            Discharge Planning   FRANNY: 1/11/2025     Code Status: Full Code   Medical Readiness for Discharge Date: 1/9/2025               Gerardo Ibarra MD  Department of Hospital Medicine   [facility]

## 2025-01-12 PROCEDURE — 25000003 PHARM REV CODE 250: Performed by: HOSPITALIST

## 2025-01-12 PROCEDURE — 99024 POSTOP FOLLOW-UP VISIT: CPT | Mod: GT,,, | Performed by: UROLOGY

## 2025-01-12 PROCEDURE — 99233 SBSQ HOSP IP/OBS HIGH 50: CPT | Mod: ,,, | Performed by: HOSPITALIST

## 2025-01-12 PROCEDURE — 63600175 PHARM REV CODE 636 W HCPCS: Mod: JZ,TB | Performed by: OBSTETRICS & GYNECOLOGY

## 2025-01-12 PROCEDURE — 25000003 PHARM REV CODE 250: Performed by: OBSTETRICS & GYNECOLOGY

## 2025-01-12 PROCEDURE — 11000001 HC ACUTE MED/SURG PRIVATE ROOM

## 2025-01-12 RX ORDER — AMLODIPINE BESYLATE 5 MG/1
5 TABLET ORAL DAILY
Status: DISCONTINUED | OUTPATIENT
Start: 2025-01-12 | End: 2025-01-14

## 2025-01-12 RX ORDER — HYDRALAZINE HYDROCHLORIDE 25 MG/1
25 TABLET, FILM COATED ORAL EVERY 8 HOURS PRN
Status: DISCONTINUED | OUTPATIENT
Start: 2025-01-12 | End: 2025-01-14 | Stop reason: HOSPADM

## 2025-01-12 RX ADMIN — MUPIROCIN: 20 OINTMENT TOPICAL at 08:01

## 2025-01-12 RX ADMIN — FAMOTIDINE 20 MG: 20 TABLET, FILM COATED ORAL at 09:01

## 2025-01-12 RX ADMIN — AMLODIPINE BESYLATE 5 MG: 5 TABLET ORAL at 08:01

## 2025-01-12 RX ADMIN — METOPROLOL SUCCINATE 50 MG: 50 TABLET, EXTENDED RELEASE ORAL at 08:01

## 2025-01-12 RX ADMIN — HYDROMORPHONE HYDROCHLORIDE 1 MG: 2 INJECTION, SOLUTION INTRAMUSCULAR; INTRAVENOUS; SUBCUTANEOUS at 04:01

## 2025-01-12 RX ADMIN — CITALOPRAM HYDROBROMIDE 10 MG: 10 TABLET ORAL at 09:01

## 2025-01-12 RX ADMIN — OXYCODONE AND ACETAMINOPHEN 1 TABLET: 10; 325 TABLET ORAL at 08:01

## 2025-01-12 RX ADMIN — IBUPROFEN 600 MG: 600 TABLET ORAL at 05:01

## 2025-01-12 RX ADMIN — IBUPROFEN 600 MG: 600 TABLET ORAL at 12:01

## 2025-01-12 RX ADMIN — CEFDINIR 300 MG: 300 CAPSULE ORAL at 08:01

## 2025-01-12 RX ADMIN — OXYCODONE AND ACETAMINOPHEN 1 TABLET: 10; 325 TABLET ORAL at 03:01

## 2025-01-12 RX ADMIN — MUPIROCIN: 20 OINTMENT TOPICAL at 09:01

## 2025-01-12 RX ADMIN — CEFDINIR 300 MG: 300 CAPSULE ORAL at 09:01

## 2025-01-12 RX ADMIN — HYDROMORPHONE HYDROCHLORIDE 1 MG: 2 INJECTION, SOLUTION INTRAMUSCULAR; INTRAVENOUS; SUBCUTANEOUS at 10:01

## 2025-01-12 RX ADMIN — FAMOTIDINE 20 MG: 20 TABLET, FILM COATED ORAL at 08:01

## 2025-01-12 RX ADMIN — HYDROMORPHONE HYDROCHLORIDE 1 MG: 2 INJECTION, SOLUTION INTRAMUSCULAR; INTRAVENOUS; SUBCUTANEOUS at 05:01

## 2025-01-12 RX ADMIN — OXYCODONE AND ACETAMINOPHEN 1 TABLET: 10; 325 TABLET ORAL at 09:01

## 2025-01-12 RX ADMIN — LOSARTAN POTASSIUM 100 MG: 100 TABLET, FILM COATED ORAL at 08:01

## 2025-01-12 NOTE — PROGRESS NOTES
[facility]  Obstetrics & Gynecology  Progress Note    Patient Name: Haleigh Newman  MRN: [id number]  Admission Date: 1/8/2025  Primary Care Provider: Francisco Mayo DO  Principal Problem: S/P abdominal hysterectomy    Subjective:     Interval History: POD #4   s/p XI ROBOTIC HYSTERECTOMY,WITH BILATERAL SALPINGECTOMY and OOPHORECTOMY,RIGHT OVARY, XI ROBOTIC LYSIS, ADHESIONS. Then by Urology: Exploratory laparotomy, open cystorrrhaphy, cystoscopy, and bilateral retrograde pyelography     She notes incision drainage today. +BM. Is ambulatory. Notes pain controlled. Is tolerating PO without nausea, vomiting    Scheduled Meds:   amLODIPine  5 mg Oral Daily    cefdinir  300 mg Oral BID    citalopram  10 mg Oral QHS    famotidine  20 mg Oral BID    ibuprofen  600 mg Oral Q6H    losartan  100 mg Oral Daily    metoprolol succinate  50 mg Oral Daily    mupirocin   Nasal BID    polyethylene glycol  17 g Oral Daily     Continuous Infusions:   loperamide  2 mg Oral Continuous PRN         PRN Meds:  Current Facility-Administered Medications:     aluminum & magnesium hydroxide-simethicone, 30 mL, Oral, Q6H PRN    bisacodyL, 10 mg, Rectal, Daily PRN    diphenhydrAMINE, 25 mg, Oral, Q4H PRN    diphenhydrAMINE, 25 mg, Intravenous, Q4H PRN    hydrALAZINE, 10 mg, Intravenous, Q6H PRN    hydrALAZINE, 25 mg, Oral, Q8H PRN    HYDROmorphone, 1 mg, Intravenous, Q6H PRN    lactulose, 20 g, Oral, Q6H PRN    loperamide, 2 mg, Oral, Continuous PRN    ondansetron, 8 mg, Oral, Q8H PRN    oxyCODONE-acetaminophen, 1 tablet, Oral, Q4H PRN    oxyCODONE-acetaminophen, 1 tablet, Oral, Q4H PRN    Review of patient's allergies indicates:  No Known Allergies    Objective:     Vital Signs (Most Recent):  Temp: 98 °F (36.7 °C) (01/12/25 0730)  Pulse: 74 (01/12/25 0730)  Resp: 15 (01/12/25 0846)  BP: (!) 164/91 (01/12/25 0730)  SpO2: 98 % (01/12/25 0730) Vital Signs (24h Range):  Temp:  [97.7 °F (36.5 °C)-98.6 °F (37 °C)] 98 °F (36.7  °C)  Pulse:  [70-79] 74  Resp:  [15-18] 15  SpO2:  [95 %-99 %] 98 %  BP: (124-164)/(80-98) 164/91     Weight: 100.2 kg (221 lb)  Body mass index is 37.93 kg/m².  Patient's last menstrual period was 12/07/2024 (exact date).    I&O (Last 24H):    Intake/Output Summary (Last 24 hours) at 1/12/2025 1049  Last data filed at 1/12/2025 0438  Gross per 24 hour   Intake --   Output 1440 ml   Net -1440 ml       Physical Exam:   Constitutional: She is oriented to person, place, and time. She appears well-developed and well-nourished.    HENT:   Head: Normocephalic.    Eyes: EOM are normal.      Pulmonary/Chest: Breath sounds normal.        Abdominal: She exhibits distension and abdominal incision. There is abdominal tenderness.             Musculoskeletal: Moves all extremeties.       Neurological: She is alert and oriented to person, place, and time.    Skin: Skin is warm and dry.    Psychiatric: She has a normal mood and affect.         Laboratory:  Recent Lab Results       None            Diagnostic Results:      Assessment/Plan:     Active Diagnoses:    Diagnosis Date Noted POA    PRINCIPAL PROBLEM:  S/P abdominal hysterectomy [Z90.710] 01/08/2025 Unknown    Encounter for postoperative care [Z48.89] 01/11/2025 Not Applicable    Iron deficiency anemia secondary to blood loss (chronic) [D50.0] 01/08/2025 Yes    Intraoperative bladder injury [N99.81] 01/08/2025 No    Pelvic adhesions [N73.6] 01/08/2025 Yes    Anxiety with depression [F41.8]  Yes    Essential (primary) hypertension [I10] 01/09/2024 Yes    Migraine without status migrainosus, not intractable [G43.909] 01/09/2024 Yes      Problems Resolved During this Admission:     Clinical findings reviewed. Spoke with nursing staff to alert Urology regarding incision drainage and to remove dressing.   Overall continue progress towards meeting postsurgical milestones.  Continue to monitor inpatient.     Ghassan Quintanilla MD  Obstetrics & Gynecology  [facility]

## 2025-01-12 NOTE — ASSESSMENT & PLAN NOTE
Patient's blood pressure range in the last 24 hours was: BP  Min: 124/80  Max: 164/91.The patient's inpatient anti-hypertensive regimen is listed below:  Current Antihypertensives  metoprolol succinate (TOPROL-XL) 24 hr tablet 50 mg, Daily, Oral  losartan tablet 100 mg, Daily, Oral  hydrALAZINE injection 10 mg, Every 6 hours PRN, Intravenous  amLODIPine tablet 5 mg, Daily, Oral  hydrALAZINE tablet 25 mg, Every 8 hours PRN, Oral    Plan  - BP is uncontrolled, will adjust as follows:    - Restarted home antihypertensives with prn IV hydralazine as clinically indicated    1/9: Blood pressure better controlled.    1/10: blood pressure controlled today.     1/11: continue with current plan.     1/12:  Blood pressure elevated so Norvasc added to patient's regimen.  P.r.n. hydralazine.

## 2025-01-12 NOTE — PROGRESS NOTES
Assessment:   Status post hysterectomy  Status post cystorrhaphy      Plan:     The patient is having drainage from the midline wound and we have removed the dressing to allow the wound open to air and will plan to maintain the intra-abdominal drain as it it is more bloody than is expected and I do not want to remove it at this time.  Plan to continue broad-spectrum antibiotics and continue to advanced treatments and activities as tolerated.  We will closely follow the wound as she is at high risk of developing an infection given bladder injury and possibility of asymptomatic bacteriuria prior to surgery.  The Quintero catheter will remain in place and is clearing nicely.  Let us watch the drain output the drain character and the midline incisional wound.           Gwyn Perrin MD  Urology  2025            Subjective:      Haleigh and her nursing team have noticed more drainage from the wound today.  The drain creatinine is normal and the Quintero catheter has cleared now clear yellow urine.  The patient is accompanied by her daughter who reports generalized edema and drainage from the wound.       Past Medical History:   Diagnosis Date    Anxiety with depression     Carboxyhemoglobinemia     says she smells it when she drives her car    Essential (primary) hypertension     Migraine without status migrainosus, not intractable 2024    Nicotine dependence     SUDHEER on CPAP      Past Surgical History:   Procedure Laterality Date    BILATERAL TUBAL LIGATION      BLADDER REPAIR  2025    Dr. Perrin    BLADDER REPAIR  2025    Procedure: REPAIR, BLADDER.OPEN;  Surgeon: Gwyn Perrin MD;  Location: [facility] OR;  Service: Urology;;     SECTION      CYSTOSCOPY W/ RETROGRADES Bilateral 2025    Procedure: CYSTOSCOPY, WITH RETROGRADE PYELOGRAM;  Surgeon: Gwyn Perrin MD;  Location: [facility] OR;  Service: Urology;  Laterality: Bilateral;    LAPAROTOMY, EXPLORATORY  2025    Procedure:  LAPAROTOMY, EXPLORATORY;  Surgeon: Gwyn Perrin MD;  Location: [facility] OR;  Service: Urology;;    LYSIS OF ADHESIONS  01/08/2025    Dr. Taylor    ROBOT-ASSISTED LAPAROSCOPIC LYSIS OF ADHESIONS USING DA VIANCA XI  1/8/2025    Procedure: XI ROBOTIC LYSIS, ADHESIONS;  Surgeon: Dawood Ma MD;  Location: [facility] OR;  Service: OB/GYN;;    ROBOTIC HYSTERECTOMY, WITH SALPINGO-OOPHORECTOMY Bilateral 01/08/2025    Dr. Dawood Ma    XI ROBOTIC HYSTERECTOMY, WITH SALPINGO-OOPHORECTOMY Bilateral 1/8/2025    Procedure: XI ROBOTIC HYSTERECTOMY,WITH BILATERAL SALPINGO-OOPHORECTOMY,RIGHT OVARY;  Surgeon: Dawood Ma MD;  Location: [facility] OR;  Service: OB/GYN;  Laterality: Bilateral;        No current facility-administered medications on file prior to encounter.     Current Outpatient Medications on File Prior to Encounter   Medication Sig Dispense Refill    EScitalopram oxalate (LEXAPRO) 10 MG tablet Take 10 mg by mouth once daily.      losartan (COZAAR) 100 MG tablet Take 1 tablet (100 mg total) by mouth once daily. 90 tablet 3    metoprolol succinate (TOPROL-XL) 50 MG 24 hr tablet Take 1 tablet (50 mg total) by mouth once daily. 90 tablet 3        Review of patient's allergies indicates:  No Known Allergies  Vitals:    01/12/25 1153   BP: (!) 156/90   Pulse: 76   Resp: 18   Temp: 97.7 °F (36.5 °C)          Review of Systems   Constitutional:  Positive for activity change. Negative for fever.   Respiratory:  Negative for cough.    Cardiovascular:  Positive for leg swelling.   Gastrointestinal:  Positive for abdominal pain.   Genitourinary:  Negative for decreased urine volume and hematuria.   Skin:  Positive for wound.   Neurological:  Positive for weakness.        Objective:     Physical Exam  Vitals reviewed.   Cardiovascular:      Rate and Rhythm: Normal rate.   Pulmonary:      Effort: Pulmonary effort is normal.   Abdominal:      Palpations: Abdomen is soft.      Tenderness: There is abdominal tenderness.       Comments: The midline wound is soapy and there was some maceration.  There was no gross purulence but the drain sponges were removed to allow the wound open to air.  The wound was cleansed.  There is no creeping erythema that is visible right now.  The intra-abdominal drain is cherry red without clots.  The Quintero catheter is in place draining clear yellow urine without debris.    Musculoskeletal:         General: Swelling present. No tenderness.      Right lower leg: Edema present.      Left lower leg: Edema present.   Neurological:      Mental Status: Mental status is at baseline.              CMP  Sodium   Date Value Ref Range Status   01/11/2025 138 136 - 145 mmol/L Final     Potassium   Date Value Ref Range Status   01/11/2025 4.1 3.5 - 5.1 mmol/L Final     Chloride   Date Value Ref Range Status   01/11/2025 106 98 - 107 mmol/L Final     CO2   Date Value Ref Range Status   01/11/2025 26 22 - 29 mmol/L Final     Glucose   Date Value Ref Range Status   01/11/2025 84 74 - 100 mg/dL Final     BUN   Date Value Ref Range Status   01/11/2025 6 (L) 7 - 19 mg/dL Final     Creatinine   Date Value Ref Range Status   01/11/2025 0.71 0.55 - 1.02 mg/dL Final     Calcium   Date Value Ref Range Status   01/11/2025 7.7 (L) 8.4 - 10.2 mg/dL Final     Total Protein   Date Value Ref Range Status   12/22/2024 7.7 6.4 - 8.3 g/dL Final     Albumin   Date Value Ref Range Status   12/22/2024 3.8 3.5 - 5.0 g/dL Final     Bilirubin, Total   Date Value Ref Range Status   12/22/2024 0.4 <=1.5 mg/dL Final     Alk Phos   Date Value Ref Range Status   12/22/2024 76 40 - 150 U/L Final     AST   Date Value Ref Range Status   12/22/2024 27 5 - 34 U/L Final     ALT   Date Value Ref Range Status   12/22/2024 22 <=55 U/L Final     Anion Gap   Date Value Ref Range Status   01/11/2025 10 7 - 16 mmol/L Final     eGFR   Date Value Ref Range Status   01/11/2025 108 >=60 mL/min/1.73m2 Final      BMP  Lab Results   Component Value Date      01/11/2025    K 4.1 01/11/2025     01/11/2025    CO2 26 01/11/2025    BUN 6 (L) 01/11/2025    CREATININE 0.71 01/11/2025    CALCIUM 7.7 (L) 01/11/2025    ANIONGAP 10 01/11/2025    EGFRNORACEVR 108 01/11/2025

## 2025-01-12 NOTE — SUBJECTIVE & OBJECTIVE
Interval History:     Review of Systems   Constitutional:  Negative for appetite change, chills, fatigue, fever and unexpected weight change.   HENT:  Negative for congestion, mouth sores, nosebleeds, sinus pain, sore throat and trouble swallowing.    Respiratory:  Negative for apnea, cough, chest tightness and shortness of breath.    Cardiovascular:  Negative for chest pain, palpitations and leg swelling.   Gastrointestinal:  Negative for blood in stool, constipation, diarrhea, nausea and vomiting.        Abd pain appropriate post op   Genitourinary:  Negative for decreased urine volume, difficulty urinating, dysuria and frequency.   Musculoskeletal:  Negative for arthralgias, back pain and neck pain.   Skin:  Negative for rash.   Neurological:  Negative for syncope, light-headedness and headaches.   Hematological:  Does not bruise/bleed easily.   Psychiatric/Behavioral:  Negative for confusion and suicidal ideas.      Objective:     Vital Signs (Most Recent):  Temp: 97.7 °F (36.5 °C) (01/12/25 1153)  Pulse: 76 (01/12/25 1153)  Resp: 15 (01/12/25 1505)  BP: (!) 156/90 (01/12/25 1153)  SpO2: 98 % (01/12/25 1153) Vital Signs (24h Range):  Temp:  [97.7 °F (36.5 °C)-98.6 °F (37 °C)] 97.7 °F (36.5 °C)  Pulse:  [70-79] 76  Resp:  [14-18] 15  SpO2:  [95 %-99 %] 98 %  BP: (124-164)/(80-98) 156/90     Weight: 100.2 kg (221 lb)  Body mass index is 37.93 kg/m².    Intake/Output Summary (Last 24 hours) at 1/12/2025 1604  Last data filed at 1/12/2025 1051  Gross per 24 hour   Intake 480 ml   Output 2610 ml   Net -2130 ml         Physical Exam  Vitals and nursing note reviewed. Exam conducted with a chaperone present.   Constitutional:       Appearance: Normal appearance.   HENT:      Head: Atraumatic.      Mouth/Throat:      Mouth: Mucous membranes are moist.      Pharynx: Oropharynx is clear.   Eyes:      Conjunctiva/sclera: Conjunctivae normal.      Pupils: Pupils are equal, round, and reactive to light.   Cardiovascular:       Rate and Rhythm: Normal rate and regular rhythm.      Pulses: Normal pulses.      Heart sounds: Normal heart sounds.   Pulmonary:      Effort: Pulmonary effort is normal.      Breath sounds: Normal breath sounds.   Abdominal:      General: Bowel sounds are normal. There is no distension.      Tenderness: There is abdominal tenderness. There is no guarding or rebound.   Musculoskeletal:         General: Normal range of motion.      Right lower leg: No edema.      Left lower leg: No edema.   Skin:     General: Skin is warm and dry.      Coloration: Skin is not jaundiced or pale.      Findings: No bruising, lesion or rash.   Neurological:      General: No focal deficit present.      Mental Status: She is alert and oriented to person, place, and time.   Psychiatric:         Mood and Affect: Mood normal.             Significant Labs: All pertinent labs within the past 24 hours have been reviewed.    Significant Imaging: I have reviewed all pertinent imaging results/findings within the past 24 hours.

## 2025-01-12 NOTE — PLAN OF CARE
Problem: Adult Inpatient Plan of Care  Goal: Plan of Care Review  1/12/2025 1531 by Marley Swift RN  Outcome: Progressing  1/12/2025 1530 by Marley Swift RN  Outcome: Progressing  Goal: Patient-Specific Goal (Individualized)  1/12/2025 1531 by Marley Swift RN  Outcome: Progressing  1/12/2025 1530 by Marley Swift RN  Outcome: Progressing  Goal: Absence of Hospital-Acquired Illness or Injury  1/12/2025 1531 by Marley Swift RN  Outcome: Progressing  1/12/2025 1530 by Marley Swift RN  Outcome: Progressing  Goal: Optimal Comfort and Wellbeing  1/12/2025 1531 by Marley Swift RN  Outcome: Progressing  1/12/2025 1530 by Marley Swift RN  Outcome: Progressing  Goal: Readiness for Transition of Care  1/12/2025 1531 by Marley Swift RN  Outcome: Progressing  1/12/2025 1530 by Marley Swift RN  Outcome: Progressing

## 2025-01-12 NOTE — PROGRESS NOTES
[facility] Hospital Medicine  Progress Note    Patient Name: Haleigh Newman  MRN: [id number]  Patient Class: IP- Inpatient   Admission Date: 1/8/2025  Length of Stay: 4 days  Attending Physician: Dawood Ma MD  Primary Care Provider: Francisco Mayo DO        Subjective     Principal Problem:S/P abdominal hysterectomy    HPI:  45 yo F underwent robotic hysterectomy with BSO, KE and bladder repair today.  Patient with known HTN and currently with elevated BP.   Patient is doing well.  No complaints of pain.  She is asking how long she will need to keep the catheter in place.  Will monitor UOP overnight.  No N/V.  She does use a CPAP at home but says she is comfortable with the nasal cannula but will need to monitor closely for CO2 retention and restart CPAP tomorrow if no N/V tonight.      Remainder of ROS as below.  See assessment and plan below for problem based evaluation      Overview/Hospital Course:  No notes on file    Interval History:     Review of Systems   Constitutional:  Negative for appetite change, chills, fatigue, fever and unexpected weight change.   HENT:  Negative for congestion, mouth sores, nosebleeds, sinus pain, sore throat and trouble swallowing.    Respiratory:  Negative for apnea, cough, chest tightness and shortness of breath.    Cardiovascular:  Negative for chest pain, palpitations and leg swelling.   Gastrointestinal:  Negative for blood in stool, constipation, diarrhea, nausea and vomiting.        Abd pain appropriate post op   Genitourinary:  Negative for decreased urine volume, difficulty urinating, dysuria and frequency.   Musculoskeletal:  Negative for arthralgias, back pain and neck pain.   Skin:  Negative for rash.   Neurological:  Negative for syncope, light-headedness and headaches.   Hematological:  Does not bruise/bleed easily.   Psychiatric/Behavioral:  Negative for confusion and suicidal ideas.      Objective:     Vital Signs (Most Recent):  Temp:  97.7 °F (36.5 °C) (01/12/25 1153)  Pulse: 76 (01/12/25 1153)  Resp: 15 (01/12/25 1505)  BP: (!) 156/90 (01/12/25 1153)  SpO2: 98 % (01/12/25 1153) Vital Signs (24h Range):  Temp:  [97.7 °F (36.5 °C)-98.6 °F (37 °C)] 97.7 °F (36.5 °C)  Pulse:  [70-79] 76  Resp:  [14-18] 15  SpO2:  [95 %-99 %] 98 %  BP: (124-164)/(80-98) 156/90     Weight: 100.2 kg (221 lb)  Body mass index is 37.93 kg/m².    Intake/Output Summary (Last 24 hours) at 1/12/2025 1604  Last data filed at 1/12/2025 1051  Gross per 24 hour   Intake 480 ml   Output 2610 ml   Net -2130 ml         Physical Exam  Vitals and nursing note reviewed. Exam conducted with a chaperone present.   Constitutional:       Appearance: Normal appearance.   HENT:      Head: Atraumatic.      Mouth/Throat:      Mouth: Mucous membranes are moist.      Pharynx: Oropharynx is clear.   Eyes:      Conjunctiva/sclera: Conjunctivae normal.      Pupils: Pupils are equal, round, and reactive to light.   Cardiovascular:      Rate and Rhythm: Normal rate and regular rhythm.      Pulses: Normal pulses.      Heart sounds: Normal heart sounds.   Pulmonary:      Effort: Pulmonary effort is normal.      Breath sounds: Normal breath sounds.   Abdominal:      General: Bowel sounds are normal. There is no distension.      Tenderness: There is abdominal tenderness. There is no guarding or rebound.   Musculoskeletal:         General: Normal range of motion.      Right lower leg: No edema.      Left lower leg: No edema.   Skin:     General: Skin is warm and dry.      Coloration: Skin is not jaundiced or pale.      Findings: No bruising, lesion or rash.   Neurological:      General: No focal deficit present.      Mental Status: She is alert and oriented to person, place, and time.   Psychiatric:         Mood and Affect: Mood normal.             Significant Labs: All pertinent labs within the past 24 hours have been reviewed.    Significant Imaging: I have reviewed all pertinent imaging  results/findings within the past 24 hours.    Assessment and Plan     * S/P abdominal hysterectomy  Defer to primary surgical team.    Agree with pain control, DVT prophylaxis and rehab.     1/10: continue per ob/gyn.   1/11: mgmt per ob/gyn.        Anxiety with depression  Patient works night shift and wants to continue her celexa qhs.          SUDHEER on CPAP  Holding CPAP at present due to abd surgery and potential for N/V and aspiration.    Will monitor for CO2 retention and recommend not over oxygenating and decreasing respiratory drive.        Migraine without status migrainosus, not intractable  Patient with prn triptan as OP   Will hold at this time due to uncontrolled HTN      Essential (primary) hypertension  Patient's blood pressure range in the last 24 hours was: BP  Min: 124/80  Max: 164/91.The patient's inpatient anti-hypertensive regimen is listed below:  Current Antihypertensives  metoprolol succinate (TOPROL-XL) 24 hr tablet 50 mg, Daily, Oral  losartan tablet 100 mg, Daily, Oral  hydrALAZINE injection 10 mg, Every 6 hours PRN, Intravenous  amLODIPine tablet 5 mg, Daily, Oral  hydrALAZINE tablet 25 mg, Every 8 hours PRN, Oral    Plan  - BP is uncontrolled, will adjust as follows:    - Restarted home antihypertensives with prn IV hydralazine as clinically indicated    1/9: Blood pressure better controlled.    1/10: blood pressure controlled today.     1/11: continue with current plan.     1/12:  Blood pressure elevated so Norvasc added to patient's regimen.  P.r.n. hydralazine.      VTE Risk Mitigation (From admission, onward)      None            Discharge Planning   FRANNY: 1/11/2025     Code Status: Full Code   Medical Readiness for Discharge Date: 1/9/2025               Gerardo Ibarra MD  Department of Hospital Medicine   [facility]

## 2025-01-13 ENCOUNTER — TELEPHONE (OUTPATIENT)
Dept: OBSTETRICS AND GYNECOLOGY | Facility: CLINIC | Age: 45
End: 2025-01-13
Payer: COMMERCIAL

## 2025-01-13 PROCEDURE — 94761 N-INVAS EAR/PLS OXIMETRY MLT: CPT

## 2025-01-13 PROCEDURE — 25000003 PHARM REV CODE 250: Performed by: HOSPITALIST

## 2025-01-13 PROCEDURE — 99233 SBSQ HOSP IP/OBS HIGH 50: CPT | Mod: ,,, | Performed by: HOSPITALIST

## 2025-01-13 PROCEDURE — 99900035 HC TECH TIME PER 15 MIN (STAT)

## 2025-01-13 PROCEDURE — 11000001 HC ACUTE MED/SURG PRIVATE ROOM

## 2025-01-13 PROCEDURE — 25000003 PHARM REV CODE 250: Performed by: OBSTETRICS & GYNECOLOGY

## 2025-01-13 PROCEDURE — 63600175 PHARM REV CODE 636 W HCPCS: Mod: JZ,TB | Performed by: OBSTETRICS & GYNECOLOGY

## 2025-01-13 RX ORDER — OXYCODONE HYDROCHLORIDE 5 MG/1
10 TABLET ORAL EVERY 6 HOURS PRN
Status: DISCONTINUED | OUTPATIENT
Start: 2025-01-13 | End: 2025-01-14 | Stop reason: HOSPADM

## 2025-01-13 RX ORDER — OXYCODONE HYDROCHLORIDE 5 MG/1
10 TABLET ORAL EVERY 6 HOURS PRN
Status: DISCONTINUED | OUTPATIENT
Start: 2025-01-13 | End: 2025-01-13

## 2025-01-13 RX ORDER — IBUPROFEN 400 MG/1
800 TABLET ORAL EVERY 8 HOURS
Status: DISCONTINUED | OUTPATIENT
Start: 2025-01-13 | End: 2025-01-14 | Stop reason: HOSPADM

## 2025-01-13 RX ADMIN — HYDROMORPHONE HYDROCHLORIDE 1 MG: 2 INJECTION, SOLUTION INTRAMUSCULAR; INTRAVENOUS; SUBCUTANEOUS at 06:01

## 2025-01-13 RX ADMIN — LOSARTAN POTASSIUM 100 MG: 100 TABLET, FILM COATED ORAL at 08:01

## 2025-01-13 RX ADMIN — IBUPROFEN 800 MG: 400 TABLET, FILM COATED ORAL at 02:01

## 2025-01-13 RX ADMIN — IBUPROFEN 600 MG: 600 TABLET ORAL at 05:01

## 2025-01-13 RX ADMIN — OXYCODONE 10 MG: 5 TABLET ORAL at 12:01

## 2025-01-13 RX ADMIN — OXYCODONE AND ACETAMINOPHEN 1 TABLET: 10; 325 TABLET ORAL at 03:01

## 2025-01-13 RX ADMIN — FAMOTIDINE 20 MG: 20 TABLET, FILM COATED ORAL at 08:01

## 2025-01-13 RX ADMIN — CEFDINIR 300 MG: 300 CAPSULE ORAL at 08:01

## 2025-01-13 RX ADMIN — ALUMINUM HYDROXIDE, MAGNESIUM HYDROXIDE, AND DIMETHICONE 30 ML: 400; 400; 40 SUSPENSION ORAL at 12:01

## 2025-01-13 RX ADMIN — IBUPROFEN 800 MG: 400 TABLET, FILM COATED ORAL at 08:01

## 2025-01-13 RX ADMIN — OXYCODONE AND ACETAMINOPHEN 1 TABLET: 10; 325 TABLET ORAL at 11:01

## 2025-01-13 RX ADMIN — MUPIROCIN: 20 OINTMENT TOPICAL at 08:01

## 2025-01-13 RX ADMIN — HYDROMORPHONE HYDROCHLORIDE 1 MG: 2 INJECTION, SOLUTION INTRAMUSCULAR; INTRAVENOUS; SUBCUTANEOUS at 12:01

## 2025-01-13 RX ADMIN — OXYCODONE 10 MG: 5 TABLET ORAL at 10:01

## 2025-01-13 RX ADMIN — AMLODIPINE BESYLATE 5 MG: 5 TABLET ORAL at 08:01

## 2025-01-13 RX ADMIN — OXYCODONE AND ACETAMINOPHEN 1 TABLET: 10; 325 TABLET ORAL at 06:01

## 2025-01-13 RX ADMIN — CITALOPRAM HYDROBROMIDE 10 MG: 10 TABLET ORAL at 08:01

## 2025-01-13 RX ADMIN — IBUPROFEN 600 MG: 600 TABLET ORAL at 12:01

## 2025-01-13 RX ADMIN — METOPROLOL SUCCINATE 50 MG: 50 TABLET, EXTENDED RELEASE ORAL at 08:01

## 2025-01-13 NOTE — TELEPHONE ENCOUNTER
Pt is admitted and Dr. Ma is speaking with her nurse.     ----- Message from Radha sent at 1/13/2025 10:37 AM CST -----  Who Called: Haleigh Newman    Caller is requesting assistance/information from provider's office.    Symptoms (please be specific): pain for surgery    How long has patient had these symptoms:  has been in pain for 45mins straight       Preferred Method of Contact: Phone Call  Patient's Preferred Phone Number on File: [phone]   Best Call Back Number, if different:  Additional Information: pt has been in the hospital since Wednesday.. pt has been in pain today for 45mins straight is requesting pain medication

## 2025-01-13 NOTE — PROGRESS NOTES
"Postoperative Note    Assessment/Plan:  44 y.o. with Menorrhagia with irregular cycle [N92.1] s/p XI ROBOTIC HYSTERECTOMY,WITH BILATERAL SALPINGO-OOPHORECTOMY,RIGHT OVARY, XI ROBOTIC LYSIS, ADHESIONS, Then by Urology: Exploratory laparotomy, open cystorrrhaphy, cystoscopy, and bilateral retrograde pyelography  5 Days Post-Op:    [unfilled]    Patient doing well and meeting discharge criteria from a GYN standpoint.  Will await recommendations from Urology on discharge planning and follow up recommendations.   Ambulation encouraged.  Leg bag teaching prior to discharge.     Monitor for acute changes.     Continue Cefdinir per Urology recommendations.    Will adjust pain medication for better pain control: Ibuprofen 800 mg po q 8 h ATC. Percocet 10 mg po q 4 h prn pain. D/C IV Dilaudid. Start Oxycodone 10 mg po q 6 h prn breakthrough pain.     CC: No chief complaint on file.      Subjective:  S/p XI ROBOTIC HYSTERECTOMY,WITH BILATERAL SALPINGO-OOPHORECTOMY,RIGHT OVARY, XI ROBOTIC LYSIS, ADHESIONS, 5 Days Post-Op    Pt seen and examined.  Doing well. No concerns complaints. Pain moderately controlled. She has had 4 Bms since surgery. She was passing gas regularly, but this is slowing down over the last day. Ambulation encouraged. She has noticed a small amount of drainage from her incision.     Review of Systems - The following ROS was otherwise negative, except as noted in the HPI:  constitutional, respiratory, cardiovascular, gastrointestinal, genitourinary    Objective:  BP (!) 163/96   Pulse 74   Temp 98.1 °F (36.7 °C) (Oral)   Resp 16   Ht 5' 4" (1.626 m)   Wt 100.2 kg (221 lb)   LMP 12/07/2024 (Exact Date)   SpO2 98%   Breastfeeding No   BMI 37.93 kg/m²   General:  Alert, well appearing, no acute distress  Abdomen:  Soft, appropriately tender to palpation, non-distended  Incision:  Well healed, appears c/d/I x 3 laparoscopic incisions. On vertical incision staples intact except one of the center ones. " Minimal serous drainage that is dried. Mild incisional edema. No erythema or purulent drainage.  Drain serosanginous fluid.  Quintero catheter urine clear.  Extremities:  No redness or tenderness in LE, neg Benjamin's sign    Path: Benign.  Specimens (From admission, onward)       Start     Ordered    01/08/25 1018  Surgical Pathology  RELEASE UPON ORDERING         01/08/25 1018                   UOP 4200 cc/24 h.  Drain: 100 cc/24 h.

## 2025-01-13 NOTE — TELEPHONE ENCOUNTER
Will speak with Dr. Ma. Messages jenniferl not be sent through the call center for pt's currently admitted. Needs to go through nurse on duty.       ----- Message from Brianna sent at 1/13/2025 11:56 AM CST -----  Regarding: Pt. in hospital  Who Called: Haleigh Newman    Caller is requesting assistance/information from provider's office.    Symptoms (please be specific): Pt. In hospital after procedure still in pain.    Preferred Method of Contact: Phone Call  Patient's Preferred Phone Number on File: [phone]     Additional Information: Pt. Stated the Rx ibuprofen tablet 800 mg and oxyCODONE immediate release tablet 10 mg  that she took over an hr ago is not working. She wants Dr. Ma to come check on her. She stated th IV Rx that starts w/ a D worked better than the other Rx. I did not see it in her chart.

## 2025-01-14 VITALS
HEART RATE: 70 BPM | BODY MASS INDEX: 37.73 KG/M2 | SYSTOLIC BLOOD PRESSURE: 129 MMHG | DIASTOLIC BLOOD PRESSURE: 75 MMHG | RESPIRATION RATE: 16 BRPM | HEIGHT: 64 IN | OXYGEN SATURATION: 97 % | WEIGHT: 221 LBS | TEMPERATURE: 98 F

## 2025-01-14 DIAGNOSIS — S37.20XD INJURY OF BLADDER, SUBSEQUENT ENCOUNTER: Primary | ICD-10-CM

## 2025-01-14 LAB
ANION GAP SERPL CALCULATED.3IONS-SCNC: 11 MMOL/L (ref 7–16)
BASOPHILS # BLD AUTO: 0.02 K/UL (ref 0–0.2)
BASOPHILS NFR BLD AUTO: 0.2 % (ref 0–1)
BUN SERPL-MCNC: 7 MG/DL (ref 7–19)
BUN/CREAT SERPL: 9 (ref 6–20)
CALCIUM SERPL-MCNC: 9 MG/DL (ref 8.4–10.2)
CHLORIDE SERPL-SCNC: 103 MMOL/L (ref 98–107)
CO2 SERPL-SCNC: 26 MMOL/L (ref 22–29)
CREAT SERPL-MCNC: 0.82 MG/DL (ref 0.55–1.02)
DIFFERENTIAL METHOD BLD: ABNORMAL
EGFR (NO RACE VARIABLE) (RUSH/TITUS): 91 ML/MIN/1.73M2
EOSINOPHIL # BLD AUTO: 0.3 K/UL (ref 0–0.5)
EOSINOPHIL NFR BLD AUTO: 3.3 % (ref 1–4)
ERYTHROCYTE [DISTWIDTH] IN BLOOD BY AUTOMATED COUNT: 13.2 % (ref 11.5–14.5)
GLUCOSE SERPL-MCNC: 94 MG/DL (ref 74–100)
HCT VFR BLD AUTO: 37 % (ref 38–47)
HGB BLD-MCNC: 11.2 G/DL (ref 12–16)
IMM GRANULOCYTES # BLD AUTO: 0.02 K/UL (ref 0–0.04)
IMM GRANULOCYTES NFR BLD: 0.2 % (ref 0–0.4)
LYMPHOCYTES # BLD AUTO: 1.87 K/UL (ref 1–4.8)
LYMPHOCYTES NFR BLD AUTO: 20.5 % (ref 27–41)
MCH RBC QN AUTO: 28 PG (ref 27–31)
MCHC RBC AUTO-ENTMCNC: 30.3 G/DL (ref 32–36)
MCV RBC AUTO: 92.5 FL (ref 80–96)
MONOCYTES # BLD AUTO: 0.6 K/UL (ref 0–0.8)
MONOCYTES NFR BLD AUTO: 6.6 % (ref 2–6)
MPC BLD CALC-MCNC: 11.4 FL (ref 9.4–12.4)
NEUTROPHILS # BLD AUTO: 6.3 K/UL (ref 1.8–7.7)
NEUTROPHILS NFR BLD AUTO: 69.2 % (ref 53–65)
NRBC # BLD AUTO: 0 X10E3/UL
NRBC, AUTO (.00): 0 %
PLATELET # BLD AUTO: 323 K/UL (ref 150–400)
POTASSIUM SERPL-SCNC: 4.2 MMOL/L (ref 3.5–5.1)
RBC # BLD AUTO: 4 M/UL (ref 4.2–5.4)
SODIUM SERPL-SCNC: 136 MMOL/L (ref 136–145)
WBC # BLD AUTO: 9.11 K/UL (ref 4.5–11)

## 2025-01-14 PROCEDURE — 36415 COLL VENOUS BLD VENIPUNCTURE: CPT | Performed by: HOSPITALIST

## 2025-01-14 PROCEDURE — 80048 BASIC METABOLIC PNL TOTAL CA: CPT | Performed by: HOSPITALIST

## 2025-01-14 PROCEDURE — 25000003 PHARM REV CODE 250: Performed by: HOSPITALIST

## 2025-01-14 PROCEDURE — 25000003 PHARM REV CODE 250: Performed by: OBSTETRICS & GYNECOLOGY

## 2025-01-14 PROCEDURE — 85025 COMPLETE CBC W/AUTO DIFF WBC: CPT | Performed by: HOSPITALIST

## 2025-01-14 PROCEDURE — 99233 SBSQ HOSP IP/OBS HIGH 50: CPT | Mod: ,,, | Performed by: HOSPITALIST

## 2025-01-14 RX ORDER — CEFDINIR 300 MG/1
300 CAPSULE ORAL 2 TIMES DAILY
Qty: 20 CAPSULE | Refills: 0 | Status: SHIPPED | OUTPATIENT
Start: 2025-01-14 | End: 2025-01-24

## 2025-01-14 RX ORDER — AMLODIPINE BESYLATE 10 MG/1
10 TABLET ORAL DAILY
Status: DISCONTINUED | OUTPATIENT
Start: 2025-01-15 | End: 2025-01-14 | Stop reason: HOSPADM

## 2025-01-14 RX ORDER — AMLODIPINE BESYLATE 5 MG/1
5 TABLET ORAL DAILY
Qty: 30 TABLET | Refills: 0 | Status: SHIPPED | OUTPATIENT
Start: 2025-01-15 | End: 2025-02-14

## 2025-01-14 RX ORDER — HYDRALAZINE HYDROCHLORIDE 25 MG/1
25 TABLET, FILM COATED ORAL EVERY 8 HOURS PRN
Qty: 90 TABLET | Refills: 0 | Status: SHIPPED | OUTPATIENT
Start: 2025-01-14 | End: 2025-02-13

## 2025-01-14 RX ORDER — IBUPROFEN 800 MG/1
800 TABLET ORAL EVERY 8 HOURS PRN
Qty: 60 TABLET | Refills: 1 | Status: SHIPPED | OUTPATIENT
Start: 2025-01-14

## 2025-01-14 RX ADMIN — OXYCODONE AND ACETAMINOPHEN 1 TABLET: 10; 325 TABLET ORAL at 08:01

## 2025-01-14 RX ADMIN — CEFDINIR 300 MG: 300 CAPSULE ORAL at 08:01

## 2025-01-14 RX ADMIN — IBUPROFEN 800 MG: 400 TABLET, FILM COATED ORAL at 03:01

## 2025-01-14 RX ADMIN — OXYCODONE AND ACETAMINOPHEN 1 TABLET: 10; 325 TABLET ORAL at 03:01

## 2025-01-14 RX ADMIN — OXYCODONE 10 MG: 5 TABLET ORAL at 05:01

## 2025-01-14 RX ADMIN — LOSARTAN POTASSIUM 100 MG: 100 TABLET, FILM COATED ORAL at 08:01

## 2025-01-14 RX ADMIN — METOPROLOL SUCCINATE 50 MG: 50 TABLET, EXTENDED RELEASE ORAL at 08:01

## 2025-01-14 RX ADMIN — AMLODIPINE BESYLATE 5 MG: 5 TABLET ORAL at 08:01

## 2025-01-14 RX ADMIN — OXYCODONE 10 MG: 5 TABLET ORAL at 11:01

## 2025-01-14 RX ADMIN — LACTULOSE 20 G: 20 SOLUTION ORAL at 05:01

## 2025-01-14 RX ADMIN — IBUPROFEN 800 MG: 400 TABLET, FILM COATED ORAL at 05:01

## 2025-01-14 RX ADMIN — FAMOTIDINE 20 MG: 20 TABLET, FILM COATED ORAL at 08:01

## 2025-01-14 RX ADMIN — OXYCODONE AND ACETAMINOPHEN 1 TABLET: 10; 325 TABLET ORAL at 01:01

## 2025-01-14 NOTE — DISCHARGE INSTRUCTIONS
Hospitalist Discharge orders  *Notify your healthcare provider if you experience any of the following: temperature >100.4  *Notify your healthcare provider if you experience any of the following: redness, tenderness, or any signs or symptoms of infection (pain, swelling, redness, odor or green/yellow drainage around incision site).  *Notify your healthcare provider if you experience any of the following: difficulty breathing or increased cough.  *Notify your physician if you experience any persistent nausea, vomiting, diarrhea or headache.  *Notify your physician if you experience any of the following: severe uncontrolled pain, worsening rash, increased confusion, weakness, dizziness, lightheadedness, or visual disturbances.   Pelvic Rest. NO intercourse, no douching, no sitting in water, no tampons.  No lifting more than 10 lbs,  No driving, operating heavy equipment, or signing an legal documents.  Report excessive bleeding to Your physician immediately.  Take meds as directed.  Call your physician for and complaints or concerns.

## 2025-01-14 NOTE — DISCHARGE SUMMARY
[facility]  Obstetrics  Discharge Summary      Patient Name: Haleigh Newman  MRN: [id number]  Admission Date: 1/8/2025  Hospital Length of Stay: 6 days  Discharge Date and Time:  01/14/2025 12:39 PM  Attending Physician: Dawood Ma MD   Discharging Provider: Dawood Ma MD  Primary Care Provider: Francisco Mayo DO    HPI:     Haleigh Newman is a 44 y.o. female who presents with complaints of menses that are heavy and last for weeks. She also has iron deficiency anemia exacerbated by acute blood loss. She desires definitive surgical management.   She has tried TXA without relief. She is getting some relief with Aygestin. However, she desires a hysterectomy.   She desires to stay the night after the surgery.     11/19/2024 US shows:     FINDINGS:  Uterus:     Size: 12.1 x 3.4 x 4.9 cm     Masses: None     Endometrium: Normal in this pre menopausal patient, measuring 9 mm.     Nabothian cysts seen.     Trace of fluid in the cervical canal.     Right ovary:     Size: 4.4 x 1.7 x 2.5 cm     Appearance: Follicles seen.     Vascular flow: Normal.     Left ovary:     Size: 3.0 x 1.5 x 1.8 cm     Appearance: Follicles seen.     Vascular Flow: Normal.     Free Fluid:     None.     Impression:     No sonographic abnormality.     Hospital Course: She underwent a total robotic hysterectomy with bilateral salpingectomy, lysis of adhesions, and incidental cystotomy that was repaired by Urology via an exploratory laparotomy via a vertical skin inicision and closure of cystotomy and cystoscopy with retrograde pyleogram.   She had a drain placed.   She was transferred to the floor. She stayed 6 days. She has an indwelling jackson catheter. She was started on Cefdinir per Urology for the remainder that she has the Jackson catheter.   She met discharge criteria and was deemed stable to d/c home on POD#6.    Her BP was managed by IM.     Consults (From admission, onward)          Status Ordering Provider      "Inpatient consult to Hospital Medicine  Once        Provider:  (Not yet assigned)    Completed DAWOOD MA            Final Active Diagnoses:    Diagnosis Date Noted POA    PRINCIPAL PROBLEM:  S/P abdominal hysterectomy [Z90.710] 01/08/2025 Yes    Encounter for postoperative care [Z48.89] 01/11/2025 Not Applicable    Iron deficiency anemia secondary to blood loss (chronic) [D50.0] 01/08/2025 Yes    Intraoperative bladder injury [N99.81] 01/08/2025 No    Pelvic adhesions [N73.6] 01/08/2025 Yes    Anxiety with depression [F41.8]  Yes    Essential (primary) hypertension [I10] 01/09/2024 Yes    Migraine without status migrainosus, not intractable [G43.909] 01/09/2024 Yes      Problems Resolved During this Admission:        Labs: CMP No results for input(s): "NA", "K", "CL", "CO2", "GLU", "BUN", "CREATININE", "CALCIUM", "PROT", "ALBUMIN", "BILITOT", "ALKPHOS", "AST", "ALT", "ANIONGAP", "ESTGFRAFRICA", "EGFRNONAA" in the last 48 hours., CBC No results for input(s): "WBC", "HGB", "HCT", "PLT" in the last 48 hours., and All labs within the past 24 hours have been reviewed  Specimen (24h ago, onward)      None                Immunizations       None            This patient has no babies on file.  Pending Diagnostic Studies:       Procedure Component Value Units Date/Time    EKG 12-lead [[id number]]     Order Status: Sent Lab Status: No result             Discharged Condition: good    Disposition: Home or Self Care    Follow Up:   Follow-up Information       Dawood Ma MD Follow up on 1/21/2025.    Specialty: Obstetrics and Gynecology  Why: for postop care.  Contact information:  [address] [place] [id number] [phone]                           Patient Instructions:      Diet Adult Regular     Pelvic Rest   Order Comments: X 9 weeks     Notify your health care provider if you experience any of the following:  temperature >100.4     Notify your health care provider if you experience any of the following:  " persistent nausea and vomiting or diarrhea     Notify your health care provider if you experience any of the following:  severe uncontrolled pain     Notify your health care provider if you experience any of the following:  difficulty breathing or increased cough     Notify your health care provider if you experience any of the following:  severe persistent headache     Notify your health care provider if you experience any of the following:  worsening rash     Notify your health care provider if you experience any of the following:  persistent dizziness, light-headedness, or visual disturbances     Notify your health care provider if you experience any of the following:  increased confusion or weakness     Notify your health care provider if you experience any of the following:   Order Comments: Vaginal bleeding > 1 pad per hour, foul smelling vaginal discharge, purulent incisional drainage, or any other acute changes.     Lifting restrictions   Order Comments: No heavy lifting > 10 lbs x 4 weeks.     No driving until:   Order Comments: Off narcotic pain medication and no pain when pressing on the brake.     Activity as tolerated     Medications:  Current Discharge Medication List        START taking these medications    Details   amLODIPine (NORVASC) 5 MG tablet Take 1 tablet (5 mg total) by mouth once daily.  Qty: 30 tablet, Refills: 0    Comments: .      cefdinir (OMNICEF) 300 MG capsule Take 1 capsule (300 mg total) by mouth 2 (two) times a day for 10 days  Qty: 20 capsule, Refills: 0      hydrALAZINE (APRESOLINE) 25 MG tablet Take 1 tablet (25 mg total) by mouth every 8 (eight) hours as needed for blood pressure (SBP >160.).  Qty: 90 tablet, Refills: 0    Comments: .      ibuprofen (ADVIL,MOTRIN) 800 MG tablet Take 1 tablet (800 mg total) by mouth every 8 (eight) hours as needed for pain.  Qty: 60 tablet, Refills: 1      oxyCODONE-acetaminophen (PERCOCET)  mg per tablet Take 1 tablet by mouth every 4  (four) hours as needed for Pain.  Qty: 30 tablet, Refills: 0    Comments: n/a   Associated Diagnoses: Menorrhagia with irregular cycle; Pelvic adhesions           CONTINUE these medications which have NOT CHANGED    Details   EScitalopram oxalate (LEXAPRO) 10 MG tablet Take 10 mg by mouth once daily.      losartan (COZAAR) 100 MG tablet Take 1 tablet (100 mg total) by mouth once daily.  Qty: 90 tablet, Refills: 3    Comments: .      metoprolol succinate (TOPROL-XL) 50 MG 24 hr tablet Take 1 tablet (50 mg total) by mouth once daily.  Qty: 90 tablet, Refills: 3    Comments: .      sumatriptan (IMITREX) 100 MG tablet Take 1 tablet at onset of headache.  May repeat in 2 hours.  No more than 2 tabs per 24 hours.  No more than 3 days of the week  Qty: 10 tablet, Refills: 2    Associated Diagnoses: Intractable chronic migraine with aura and without status migrainosus             Dawood Ma MD  Obstetrics  [facility]

## 2025-01-14 NOTE — SUBJECTIVE & OBJECTIVE
Interval History:     Review of Systems   Constitutional:  Negative for appetite change, chills, fatigue, fever and unexpected weight change.   HENT:  Negative for congestion, mouth sores, nosebleeds, sinus pain, sore throat and trouble swallowing.    Respiratory:  Negative for apnea, cough, chest tightness and shortness of breath.    Cardiovascular:  Negative for chest pain, palpitations and leg swelling.   Gastrointestinal:  Negative for blood in stool, constipation, diarrhea, nausea and vomiting.        Abd pain appropriate post op   Genitourinary:  Negative for decreased urine volume, difficulty urinating, dysuria and frequency.   Musculoskeletal:  Negative for arthralgias, back pain and neck pain.   Skin:  Negative for rash.   Neurological:  Negative for syncope, light-headedness and headaches.   Hematological:  Does not bruise/bleed easily.   Psychiatric/Behavioral:  Negative for confusion and suicidal ideas.      Objective:     Vital Signs (Most Recent):  Temp: 97.9 °F (36.6 °C) (01/14/25 1131)  Pulse: 79 (01/14/25 1133)  Resp: 18 (01/14/25 1505)  BP: (!) 154/88 (01/14/25 1133)  SpO2: 97 % (01/14/25 1133) Vital Signs (24h Range):  Temp:  [97.7 °F (36.5 °C)-97.9 °F (36.6 °C)] 97.9 °F (36.6 °C)  Pulse:  [65-90] 79  Resp:  [16-20] 18  SpO2:  [96 %-98 %] 97 %  BP: (131-159)/(78-88) 154/88     Weight: 100.2 kg (221 lb)  Body mass index is 37.93 kg/m².    Intake/Output Summary (Last 24 hours) at 1/14/2025 1532  Last data filed at 1/14/2025 0540  Gross per 24 hour   Intake --   Output 2120 ml   Net -2120 ml         Physical Exam  Vitals and nursing note reviewed. Exam conducted with a chaperone present.   Constitutional:       Appearance: Normal appearance.   HENT:      Head: Atraumatic.      Mouth/Throat:      Mouth: Mucous membranes are moist.      Pharynx: Oropharynx is clear.   Eyes:      Conjunctiva/sclera: Conjunctivae normal.      Pupils: Pupils are equal, round, and reactive to light.   Cardiovascular:       Rate and Rhythm: Normal rate and regular rhythm.      Pulses: Normal pulses.      Heart sounds: Normal heart sounds.   Pulmonary:      Effort: Pulmonary effort is normal.      Breath sounds: Normal breath sounds.   Abdominal:      General: Bowel sounds are normal. There is no distension.      Tenderness: There is abdominal tenderness. There is no guarding or rebound.   Musculoskeletal:         General: Normal range of motion.      Right lower leg: No edema.      Left lower leg: No edema.   Skin:     General: Skin is warm and dry.      Coloration: Skin is not jaundiced or pale.      Findings: No bruising, lesion or rash.   Neurological:      General: No focal deficit present.      Mental Status: She is alert and oriented to person, place, and time.   Psychiatric:         Mood and Affect: Mood normal.             Significant Labs: All pertinent labs within the past 24 hours have been reviewed.    Significant Imaging: I have reviewed all pertinent imaging results/findings within the past 24 hours.

## 2025-01-14 NOTE — PLAN OF CARE
Problem: Adult Inpatient Plan of Care  Goal: Plan of Care Review  Outcome: Met  Goal: Patient-Specific Goal (Individualized)  Outcome: Met  Goal: Absence of Hospital-Acquired Illness or Injury  Outcome: Met  Goal: Optimal Comfort and Wellbeing  Outcome: Met  Goal: Readiness for Transition of Care  Outcome: Met     Problem: Infection  Goal: Absence of Infection Signs and Symptoms  Outcome: Met     Problem: Wound  Goal: Optimal Coping  Outcome: Met  Goal: Optimal Functional Ability  Outcome: Met  Goal: Absence of Infection Signs and Symptoms  Outcome: Met  Goal: Improved Oral Intake  Outcome: Met  Goal: Optimal Pain Control and Function  Outcome: Met  Goal: Skin Health and Integrity  Outcome: Met  Goal: Optimal Wound Healing  Outcome: Met     Problem: Fall Injury Risk  Goal: Absence of Fall and Fall-Related Injury  Outcome: Met     Problem: Gas Exchange Impaired  Goal: Optimal Gas Exchange  Outcome: Met

## 2025-01-14 NOTE — ASSESSMENT & PLAN NOTE
Patient's blood pressure range in the last 24 hours was: BP  Min: 131/85  Max: 159/78.The patient's inpatient anti-hypertensive regimen is listed below:  Current Antihypertensives  metoprolol succinate (TOPROL-XL) 24 hr tablet 50 mg, Daily, Oral  losartan tablet 100 mg, Daily, Oral  hydrALAZINE tablet 25 mg, Every 8 hours PRN, Oral  amlodipine (NORVASC) tablet, Daily, Oral  hydrALAZINE (APRESOLINE) tablet, Every 8 hours PRN, Oral  amLODIPine tablet 10 mg, Daily, Oral    Plan  - BP is uncontrolled, will adjust as follows:    - Restarted home antihypertensives with prn IV hydralazine as clinically indicated    1/9: Blood pressure better controlled.    1/10: blood pressure controlled today.     1/11: continue with current plan.     1/12:  Blood pressure elevated so Norvasc added to patient's regimen.  P.r.n. hydralazine.    1/13:  Blood pressure elevated may be related to increased pain.    1/14: continue with current meds.  Her blood pressure fluctuates with pain.    We will sign off for now and follow peripherally.  Please let us know if anything else is needed.

## 2025-01-14 NOTE — PROGRESS NOTES
Patient examined and chart reviewed.  The midline wound is no longer leaking fluid and looks better.  The abdomen is soft and not distended.  The urine in the catheter is draining clear yellow urine.  The abdominal drain has cleared and was straw-colored and was removed. CT Cystogram ordered for Jan 23rd. I have asked for the patient to follow-up in Urology office after CT Cystogram on or about 11am on Jan 23rd to decide if we can remove the catheter.  Patient to be discharged with catheter in place.  Nursing staff aware of Quintero catheter needs upon discharge and that patient will require leg bag training enlarged bag training.

## 2025-01-14 NOTE — PROGRESS NOTES
"Postoperative Note    Assessment/Plan:  44 y.o. with Menorrhagia with irregular cycle [N92.1] s/p XI ROBOTIC HYSTERECTOMY,WITH BILATERAL SALPINGO-OOPHORECTOMY,RIGHT OVARY, XI ROBOTIC LYSIS, ADHESIONS, Then by Urology: Exploratory laparotomy, open cystorrrhaphy, cystoscopy, and bilateral retrograde pyelography  6 Days Post-Op:    [unfilled]    Stable to d/c home.   Discharge instructions discussed.   F/u with Urology for postop visit and cystogram as scheduled on 1/23.   F/u with Dr. Ma on 1/21/2025 for postop visit. Plan to remove the remainder of the staples at that time.    CC: No chief complaint on file.      Subjective:  S/p XI ROBOTIC HYSTERECTOMY,WITH BILATERAL SALPINGO-OOPHORECTOMY,RIGHT OVARY, XI ROBOTIC LYSIS, ADHESIONS, 6 Days Post-Op    Pt seen and examined.  Doing well. No concerns complaints. Pain well controlled. Bowel functioning well. +flatus. +Bms. Quintero draining clear urine. Tolerating a regular diet. No n/v.    Review of Systems - The following ROS was otherwise negative, except as noted in the HPI:  constitutional, respiratory, cardiovascular, gastrointestinal, genitourinary    Objective:  BP (!) 142/88   Pulse 90   Temp 97.7 °F (36.5 °C) (Oral)   Resp 16   Ht 5' 4" (1.626 m)   Wt 100.2 kg (221 lb)   LMP 12/07/2024 (Exact Date)   SpO2 97%   Breastfeeding No   BMI 37.93 kg/m²   General:  Alert, well appearing, no acute distress  Abdomen:  Soft, appropriately tender to palpation, non-distended  Incision:  Well healed x 3 laparoscopic incisions, appears c/d/i, no significant drainage or erythema. Staples removed from laparoscopic incisions. Vertical midline incision--on the central aspect of the incision there was one staple that was no on the incision and was half way out. This was removed. Steristrips placed. The incision was slightly edematous with some straw colored small amount of drainage. No bloody or purulent drainage.   Bandage from drain removal clean and dry.  Extremities: "  No redness or tenderness in LE, neg Benjamin's sign    Path:   Specimens (From admission, onward)       Start     Ordered    01/08/25 1018  Surgical Pathology  RELEASE UPON ORDERING         01/08/25 1018

## 2025-01-14 NOTE — PROGRESS NOTES
[facility] Hospital Medicine  Progress Note    Patient Name: Haleigh Newman  MRN: [id number]  Patient Class: IP- Inpatient   Admission Date: 1/8/2025  Length of Stay: 5 days  Attending Physician: Dawood Ma MD  Primary Care Provider: Francisco Mayo DO        Subjective     Principal Problem:S/P abdominal hysterectomy    HPI:  43 yo F underwent robotic hysterectomy with BSO, KE and bladder repair today.  Patient with known HTN and currently with elevated BP.   Patient is doing well.  No complaints of pain.  She is asking how long she will need to keep the catheter in place.  Will monitor UOP overnight.  No N/V.  She does use a CPAP at home but says she is comfortable with the nasal cannula but will need to monitor closely for CO2 retention and restart CPAP tomorrow if no N/V tonight.      Remainder of ROS as below.  See assessment and plan below for problem based evaluation      Overview/Hospital Course:  No notes on file    Interval History:     Review of Systems   Constitutional:  Negative for appetite change, chills, fatigue, fever and unexpected weight change.   HENT:  Negative for congestion, mouth sores, nosebleeds, sinus pain, sore throat and trouble swallowing.    Respiratory:  Negative for apnea, cough, chest tightness and shortness of breath.    Cardiovascular:  Negative for chest pain, palpitations and leg swelling.   Gastrointestinal:  Negative for blood in stool, constipation, diarrhea, nausea and vomiting.        Abd pain appropriate post op   Genitourinary:  Negative for decreased urine volume, difficulty urinating, dysuria and frequency.   Musculoskeletal:  Negative for arthralgias, back pain and neck pain.   Skin:  Negative for rash.   Neurological:  Negative for syncope, light-headedness and headaches.   Hematological:  Does not bruise/bleed easily.   Psychiatric/Behavioral:  Negative for confusion and suicidal ideas.      Objective:     Vital Signs (Most Recent):  Temp:  97.8 °F (36.6 °C) (01/13/25 1632)  Pulse: 73 (01/13/25 1632)  Resp: 18 (01/13/25 1813)  BP: (!) 154/86 (01/13/25 1632)  SpO2: 97 % (01/13/25 1632) Vital Signs (24h Range):  Temp:  [97.6 °F (36.4 °C)-98.2 °F (36.8 °C)] 97.8 °F (36.6 °C)  Pulse:  [73-76] 73  Resp:  [16-20] 18  SpO2:  [97 %-99 %] 97 %  BP: (142-163)/(85-96) 154/86     Weight: 100.2 kg (221 lb)  Body mass index is 37.93 kg/m².    Intake/Output Summary (Last 24 hours) at 1/13/2025 1940  Last data filed at 1/13/2025 0525  Gross per 24 hour   Intake --   Output 3075 ml   Net -3075 ml         Physical Exam  Vitals and nursing note reviewed. Exam conducted with a chaperone present.   Constitutional:       Appearance: Normal appearance.   HENT:      Head: Atraumatic.      Mouth/Throat:      Mouth: Mucous membranes are moist.      Pharynx: Oropharynx is clear.   Eyes:      Conjunctiva/sclera: Conjunctivae normal.      Pupils: Pupils are equal, round, and reactive to light.   Cardiovascular:      Rate and Rhythm: Normal rate and regular rhythm.      Pulses: Normal pulses.      Heart sounds: Normal heart sounds.   Pulmonary:      Effort: Pulmonary effort is normal.      Breath sounds: Normal breath sounds.   Abdominal:      General: Bowel sounds are normal. There is no distension.      Tenderness: There is abdominal tenderness. There is no guarding or rebound.   Musculoskeletal:         General: Normal range of motion.      Right lower leg: No edema.      Left lower leg: No edema.   Skin:     General: Skin is warm and dry.      Coloration: Skin is not jaundiced or pale.      Findings: No bruising, lesion or rash.   Neurological:      General: No focal deficit present.      Mental Status: She is alert and oriented to person, place, and time.   Psychiatric:         Mood and Affect: Mood normal.             Significant Labs: All pertinent labs within the past 24 hours have been reviewed.    Significant Imaging: I have reviewed all pertinent imaging results/findings  within the past 24 hours.    Assessment and Plan     * S/P abdominal hysterectomy  Defer to primary surgical team.    Agree with pain control, DVT prophylaxis and rehab.     1/10: continue per ob/gyn.   1/11: mgmt per ob/gyn.    1/13:  Patient reported substantial pain.  Continue management per primary team.        Anxiety with depression  Patient works night shift and wants to continue her celexa qhs.          SUDHEER on CPAP  Holding CPAP at present due to abd surgery and potential for N/V and aspiration.    Will monitor for CO2 retention and recommend not over oxygenating and decreasing respiratory drive.        Migraine without status migrainosus, not intractable  Patient with prn triptan as OP   Will hold at this time due to uncontrolled HTN      Essential (primary) hypertension  Patient's blood pressure range in the last 24 hours was: BP  Min: 142/85  Max: 163/96.The patient's inpatient anti-hypertensive regimen is listed below:  Current Antihypertensives  metoprolol succinate (TOPROL-XL) 24 hr tablet 50 mg, Daily, Oral  losartan tablet 100 mg, Daily, Oral  hydrALAZINE injection 10 mg, Every 6 hours PRN, Intravenous  amLODIPine tablet 5 mg, Daily, Oral  hydrALAZINE tablet 25 mg, Every 8 hours PRN, Oral    Plan  - BP is uncontrolled, will adjust as follows:    - Restarted home antihypertensives with prn IV hydralazine as clinically indicated    1/9: Blood pressure better controlled.    1/10: blood pressure controlled today.     1/11: continue with current plan.     1/12:  Blood pressure elevated so Norvasc added to patient's regimen.  P.r.n. hydralazine.    1/13:  Blood pressure elevated may be related to increased pain.      VTE Risk Mitigation (From admission, onward)      None            Discharge Planning   FRANNY: 1/11/2025     Code Status: Full Code   Medical Readiness for Discharge Date: 1/9/2025                 Gerardo Ibarra MD  Department of Hospital Medicine   [facility]

## 2025-01-14 NOTE — OP NOTE
[facility]  Surgery Department  Operative Note    SUMMARY     Date of Procedure: 1/8/2025     Procedure: Procedure(s) (LRB):  XI ROBOTIC HYSTERECTOMY,WITH BILATERAL SALPINGO-OOPHORECTOMY,RIGHT OVARY (Bilateral)  XI ROBOTIC LYSIS, ADHESIONS  REPAIR, BLADDER.OPEN  LAPAROTOMY, EXPLORATORY  CYSTOSCOPY, WITH RETROGRADE PYELOGRAM (Bilateral)     Surgeons and Role:  Panel 1:     * Dawood Ma MD - Primary  Panel 2:     * Gwyn Perrin MD - Primary     * Marino Taylor MD - Assisting        Pre-Operative Diagnosis: Menorrhagia with irregular cycle [N92.1]    Post-Operative Diagnosis: Post-Op Diagnosis Codes:     * Menorrhagia with irregular cycle [N92.1]     * Female pelvic peritoneal adhesions [N73.6]     * Cyst of right ovary [N83.201]     * Intraoperative bladder injury [N99.81]    Anesthesia: General      This is an attestation that is present of the 1st assistant for Dr. Perrin portion of the case

## 2025-01-14 NOTE — PROGRESS NOTES
[facility] Medicine  Progress Note    Patient Name: Haleigh Newman  MRN: [id number]  Patient Class: IP- Inpatient   Admission Date: 1/8/2025  Length of Stay: 6 days  Attending Physician: Dawood Ma MD  Primary Care Provider: Francisco Mayo DO        Subjective     Principal Problem:S/P abdominal hysterectomy    HPI:  43 yo F underwent robotic hysterectomy with BSO, KE and bladder repair today.  Patient with known HTN and currently with elevated BP.   Patient is doing well.  No complaints of pain.  She is asking how long she will need to keep the catheter in place.  Will monitor UOP overnight.  No N/V.  She does use a CPAP at home but says she is comfortable with the nasal cannula but will need to monitor closely for CO2 retention and restart CPAP tomorrow if no N/V tonight.      Remainder of ROS as below.  See assessment and plan below for problem based evaluation      Overview/Hospital Course:  No notes on file    Interval History:     Review of Systems   Constitutional:  Negative for appetite change, chills, fatigue, fever and unexpected weight change.   HENT:  Negative for congestion, mouth sores, nosebleeds, sinus pain, sore throat and trouble swallowing.    Respiratory:  Negative for apnea, cough, chest tightness and shortness of breath.    Cardiovascular:  Negative for chest pain, palpitations and leg swelling.   Gastrointestinal:  Negative for blood in stool, constipation, diarrhea, nausea and vomiting.        Abd pain appropriate post op   Genitourinary:  Negative for decreased urine volume, difficulty urinating, dysuria and frequency.   Musculoskeletal:  Negative for arthralgias, back pain and neck pain.   Skin:  Negative for rash.   Neurological:  Negative for syncope, light-headedness and headaches.   Hematological:  Does not bruise/bleed easily.   Psychiatric/Behavioral:  Negative for confusion and suicidal ideas.      Objective:     Vital Signs (Most Recent):  Temp:  97.9 °F (36.6 °C) (01/14/25 1131)  Pulse: 79 (01/14/25 1133)  Resp: 18 (01/14/25 1505)  BP: (!) 154/88 (01/14/25 1133)  SpO2: 97 % (01/14/25 1133) Vital Signs (24h Range):  Temp:  [97.7 °F (36.5 °C)-97.9 °F (36.6 °C)] 97.9 °F (36.6 °C)  Pulse:  [65-90] 79  Resp:  [16-20] 18  SpO2:  [96 %-98 %] 97 %  BP: (131-159)/(78-88) 154/88     Weight: 100.2 kg (221 lb)  Body mass index is 37.93 kg/m².    Intake/Output Summary (Last 24 hours) at 1/14/2025 1532  Last data filed at 1/14/2025 0540  Gross per 24 hour   Intake --   Output 2120 ml   Net -2120 ml         Physical Exam  Vitals and nursing note reviewed. Exam conducted with a chaperone present.   Constitutional:       Appearance: Normal appearance.   HENT:      Head: Atraumatic.      Mouth/Throat:      Mouth: Mucous membranes are moist.      Pharynx: Oropharynx is clear.   Eyes:      Conjunctiva/sclera: Conjunctivae normal.      Pupils: Pupils are equal, round, and reactive to light.   Cardiovascular:      Rate and Rhythm: Normal rate and regular rhythm.      Pulses: Normal pulses.      Heart sounds: Normal heart sounds.   Pulmonary:      Effort: Pulmonary effort is normal.      Breath sounds: Normal breath sounds.   Abdominal:      General: Bowel sounds are normal. There is no distension.      Tenderness: There is abdominal tenderness. There is no guarding or rebound.   Musculoskeletal:         General: Normal range of motion.      Right lower leg: No edema.      Left lower leg: No edema.   Skin:     General: Skin is warm and dry.      Coloration: Skin is not jaundiced or pale.      Findings: No bruising, lesion or rash.   Neurological:      General: No focal deficit present.      Mental Status: She is alert and oriented to person, place, and time.   Psychiatric:         Mood and Affect: Mood normal.             Significant Labs: All pertinent labs within the past 24 hours have been reviewed.    Significant Imaging: I have reviewed all pertinent imaging results/findings  within the past 24 hours.    Assessment and Plan     * S/P abdominal hysterectomy  Defer to primary surgical team.    Agree with pain control, DVT prophylaxis and rehab.     1/10: continue per ob/gyn.   1/11: mgmt per ob/gyn.    1/13:  Patient reported substantial pain.  Continue management per primary team.        Anxiety with depression  Patient works night shift and wants to continue her celexa qhs.          SUDHEER on CPAP  Holding CPAP at present due to abd surgery and potential for N/V and aspiration.    Will monitor for CO2 retention and recommend not over oxygenating and decreasing respiratory drive.        Migraine without status migrainosus, not intractable  Patient with prn triptan as OP   Will hold at this time due to uncontrolled HTN      Nicotine dependence  Dangers of cigarette smoking were reviewed with patient in detail. Patient was Counseled for 10 minutes or greater. Nicotine replacement options were discussed. Nicotine replacement was discussed-  will need to discuss if she would like nicotine patch.     Essential (primary) hypertension  Patient's blood pressure range in the last 24 hours was: BP  Min: 131/85  Max: 159/78.The patient's inpatient anti-hypertensive regimen is listed below:  Current Antihypertensives  metoprolol succinate (TOPROL-XL) 24 hr tablet 50 mg, Daily, Oral  losartan tablet 100 mg, Daily, Oral  hydrALAZINE tablet 25 mg, Every 8 hours PRN, Oral  amlodipine (NORVASC) tablet, Daily, Oral  hydrALAZINE (APRESOLINE) tablet, Every 8 hours PRN, Oral  amLODIPine tablet 10 mg, Daily, Oral    Plan  - BP is uncontrolled, will adjust as follows:    - Restarted home antihypertensives with prn IV hydralazine as clinically indicated    1/9: Blood pressure better controlled.    1/10: blood pressure controlled today.     1/11: continue with current plan.     1/12:  Blood pressure elevated so Norvasc added to patient's regimen.  P.r.n. hydralazine.    1/13:  Blood pressure elevated may be related  to increased pain.    1/14: continue with current meds.  Her blood pressure fluctuates with pain.    We will sign off for now and follow peripherally.  Please let us know if anything else is needed.       VTE Risk Mitigation (From admission, onward)      None            Discharge Planning   FRANNY: 1/11/2025     Code Status: Full Code   Medical Readiness for Discharge Date: 1/9/2025             Gerardo Ibarra MD  Department of Hospital Medicine   [facility]

## 2025-01-14 NOTE — PLAN OF CARE
Problem: Adult Inpatient Plan of Care  Goal: Plan of Care Review  1/14/2025 0454 by Adriana Alanis RN  Outcome: Not Progressing  1/14/2025 0002 by Adriana Alanis RN  Outcome: Progressing  Goal: Patient-Specific Goal (Individualized)  1/14/2025 0454 by Adriana Alanis RN  Outcome: Not Progressing  1/14/2025 0002 by Adriana Alanis RN  Outcome: Progressing  Goal: Absence of Hospital-Acquired Illness or Injury  1/14/2025 0454 by Adriana Alanis RN  Outcome: Not Progressing  1/14/2025 0002 by Adriana Alanis RN  Outcome: Progressing  Goal: Optimal Comfort and Wellbeing  1/14/2025 0454 by Adriana Alanis RN  Outcome: Not Progressing  1/14/2025 0002 by Adriana Alanis RN  Outcome: Progressing  Goal: Readiness for Transition of Care  1/14/2025 0454 by Adriana Alanis RN  Outcome: Not Progressing  1/14/2025 0002 by Adriana Alanis RN  Outcome: Progressing     Problem: Infection  Goal: Absence of Infection Signs and Symptoms  1/14/2025 0454 by Adriana Alanis RN  Outcome: Not Progressing  1/14/2025 0002 by Adriana Alanis RN  Outcome: Progressing     Problem: Wound  Goal: Optimal Coping  1/14/2025 0454 by Adriana Alanis RN  Outcome: Not Progressing  1/14/2025 0002 by Adriana Alanis RN  Outcome: Progressing  Goal: Optimal Functional Ability  1/14/2025 0454 by Adriana Alanis RN  Outcome: Not Progressing  1/14/2025 0002 by Adriana Alanis RN  Outcome: Progressing  Goal: Absence of Infection Signs and Symptoms  1/14/2025 0454 by Adriana Alanis RN  Outcome: Not Progressing  1/14/2025 0002 by Adriana Alanis RN  Outcome: Progressing  Goal: Improved Oral Intake  1/14/2025 0454 by Adriana Alanis RN  Outcome: Not Progressing  1/14/2025 0002 by Adriana Alanis RN  Outcome: Progressing  Goal: Optimal Pain Control and Function  1/14/2025 0454 by Annabelle, Adriana, RN  Outcome: Not Progressing  1/14/2025 0002 by Adriana Alanis RN  Outcome: Progressing  Goal: Skin Health and Integrity  1/14/2025 0454 by Adriana Alanis RN  Outcome:  Not Progressing  1/14/2025 0002 by Adriana Alanis RN  Outcome: Progressing  Goal: Optimal Wound Healing  1/14/2025 0454 by Adriana Alanis RN  Outcome: Not Progressing  1/14/2025 0002 by Adriana Alanis RN  Outcome: Progressing     Problem: Fall Injury Risk  Goal: Absence of Fall and Fall-Related Injury  1/14/2025 0454 by Adriana Alanis RN  Outcome: Not Progressing  1/14/2025 0002 by Adriana Alanis RN  Outcome: Progressing     Problem: Gas Exchange Impaired  Goal: Optimal Gas Exchange  1/14/2025 0454 by Adriana Alanis RN  Outcome: Not Progressing  1/14/2025 0002 by Adriana Alanis RN  Outcome: Progressing

## 2025-01-14 NOTE — ASSESSMENT & PLAN NOTE
Defer to primary surgical team.    Agree with pain control, DVT prophylaxis and rehab.     1/10: continue per ob/gyn.   1/11: mgmt per ob/gyn.    1/13:  Patient reported substantial pain.  Continue management per primary team.

## 2025-01-14 NOTE — ASSESSMENT & PLAN NOTE
Patient's blood pressure range in the last 24 hours was: BP  Min: 142/85  Max: 163/96.The patient's inpatient anti-hypertensive regimen is listed below:  Current Antihypertensives  metoprolol succinate (TOPROL-XL) 24 hr tablet 50 mg, Daily, Oral  losartan tablet 100 mg, Daily, Oral  hydrALAZINE injection 10 mg, Every 6 hours PRN, Intravenous  amLODIPine tablet 5 mg, Daily, Oral  hydrALAZINE tablet 25 mg, Every 8 hours PRN, Oral    Plan  - BP is uncontrolled, will adjust as follows:    - Restarted home antihypertensives with prn IV hydralazine as clinically indicated    1/9: Blood pressure better controlled.    1/10: blood pressure controlled today.     1/11: continue with current plan.     1/12:  Blood pressure elevated so Norvasc added to patient's regimen.  P.r.n. hydralazine.    1/13:  Blood pressure elevated may be related to increased pain.

## 2025-01-14 NOTE — NURSING
1720 attempted to switch jackson catheter bag to leg bag for convenience. Pt refused, states she is more comfortable with the larger bag as she voids a lot at night and does not want the bag to overfill and cause issues. Informed her that this will be against medical advice/ MD order but that is her decision. She wants to keep the larger bag and states she will not be leaving much. Educated patient on the leg back and opened one to show her how to change, empty, etc and sent one home with her. Educated family in room, as well     1750 pt wheeled off of floor via wc with family at side with transport. D/c with jackson in place per order.

## 2025-01-14 NOTE — SUBJECTIVE & OBJECTIVE
Interval History:     Review of Systems   Constitutional:  Negative for appetite change, chills, fatigue, fever and unexpected weight change.   HENT:  Negative for congestion, mouth sores, nosebleeds, sinus pain, sore throat and trouble swallowing.    Respiratory:  Negative for apnea, cough, chest tightness and shortness of breath.    Cardiovascular:  Negative for chest pain, palpitations and leg swelling.   Gastrointestinal:  Negative for blood in stool, constipation, diarrhea, nausea and vomiting.        Abd pain appropriate post op   Genitourinary:  Negative for decreased urine volume, difficulty urinating, dysuria and frequency.   Musculoskeletal:  Negative for arthralgias, back pain and neck pain.   Skin:  Negative for rash.   Neurological:  Negative for syncope, light-headedness and headaches.   Hematological:  Does not bruise/bleed easily.   Psychiatric/Behavioral:  Negative for confusion and suicidal ideas.      Objective:     Vital Signs (Most Recent):  Temp: 97.8 °F (36.6 °C) (01/13/25 1632)  Pulse: 73 (01/13/25 1632)  Resp: 18 (01/13/25 1813)  BP: (!) 154/86 (01/13/25 1632)  SpO2: 97 % (01/13/25 1632) Vital Signs (24h Range):  Temp:  [97.6 °F (36.4 °C)-98.2 °F (36.8 °C)] 97.8 °F (36.6 °C)  Pulse:  [73-76] 73  Resp:  [16-20] 18  SpO2:  [97 %-99 %] 97 %  BP: (142-163)/(85-96) 154/86     Weight: 100.2 kg (221 lb)  Body mass index is 37.93 kg/m².    Intake/Output Summary (Last 24 hours) at 1/13/2025 1940  Last data filed at 1/13/2025 0525  Gross per 24 hour   Intake --   Output 3075 ml   Net -3075 ml         Physical Exam  Vitals and nursing note reviewed. Exam conducted with a chaperone present.   Constitutional:       Appearance: Normal appearance.   HENT:      Head: Atraumatic.      Mouth/Throat:      Mouth: Mucous membranes are moist.      Pharynx: Oropharynx is clear.   Eyes:      Conjunctiva/sclera: Conjunctivae normal.      Pupils: Pupils are equal, round, and reactive to light.   Cardiovascular:       Rate and Rhythm: Normal rate and regular rhythm.      Pulses: Normal pulses.      Heart sounds: Normal heart sounds.   Pulmonary:      Effort: Pulmonary effort is normal.      Breath sounds: Normal breath sounds.   Abdominal:      General: Bowel sounds are normal. There is no distension.      Tenderness: There is abdominal tenderness. There is no guarding or rebound.   Musculoskeletal:         General: Normal range of motion.      Right lower leg: No edema.      Left lower leg: No edema.   Skin:     General: Skin is warm and dry.      Coloration: Skin is not jaundiced or pale.      Findings: No bruising, lesion or rash.   Neurological:      General: No focal deficit present.      Mental Status: She is alert and oriented to person, place, and time.   Psychiatric:         Mood and Affect: Mood normal.             Significant Labs: All pertinent labs within the past 24 hours have been reviewed.    Significant Imaging: I have reviewed all pertinent imaging results/findings within the past 24 hours.

## 2025-01-15 ENCOUNTER — TELEPHONE (OUTPATIENT)
Dept: OBSTETRICS AND GYNECOLOGY | Facility: CLINIC | Age: 45
End: 2025-01-15
Payer: COMMERCIAL

## 2025-01-15 DIAGNOSIS — G89.18 POSTOPERATIVE PAIN: Primary | ICD-10-CM

## 2025-01-15 RX ORDER — HYDROCODONE BITARTRATE AND ACETAMINOPHEN 10; 325 MG/1; MG/1
1 TABLET ORAL EVERY 4 HOURS PRN
Qty: 14 TABLET | Refills: 0 | Status: SHIPPED | OUTPATIENT
Start: 2025-01-15

## 2025-01-15 NOTE — TELEPHONE ENCOUNTER
Spoke with pt to verify pharmacy; pt voiced understanding.       ----- Message from Dawood Ma MD sent at 1/15/2025 10:44 AM CST -----  Can you see what pharmacy she uses? I'll send Norco for the other 14 to equal 30 so she'll have enough to get through the long weekend.  ----- Message -----  From: Mansi Husain LPN  Sent: 1/15/2025   9:10 AM CST  To: Dawood Ma MD    Pharmacy called and said they were only able to fill 17 of the 30 percocets due to a shortage. If she needs the additional, you'll have to write another script for the remaining.

## 2025-01-15 NOTE — TELEPHONE ENCOUNTER
Spoke with pharmacy about partial fill; Dr Ma will need to write a rx for the remainder if necessary.       ----- Message from Delaney sent at 1/15/2025  8:50 AM CST -----  Regarding: rx  Who Called: [facility]     Caller is requesting assistance/information from provider's office.    Symptoms (please be specific): said pain medication was only partially filled with 17 instead of 30 due to shortage        Preferred Method of Contact: Phone Call  Patient's Preferred Phone Number on File: [phone]   Best Call Back Number, if different:  Additional Information:

## 2025-01-21 ENCOUNTER — OFFICE VISIT (OUTPATIENT)
Dept: OBSTETRICS AND GYNECOLOGY | Facility: CLINIC | Age: 45
End: 2025-01-21
Payer: COMMERCIAL

## 2025-01-21 VITALS — SYSTOLIC BLOOD PRESSURE: 140 MMHG | DIASTOLIC BLOOD PRESSURE: 86 MMHG | HEART RATE: 80 BPM

## 2025-01-21 DIAGNOSIS — Z48.89 ENCOUNTER FOR POSTOPERATIVE CARE: Primary | ICD-10-CM

## 2025-01-21 DIAGNOSIS — G47.00 INSOMNIA, UNSPECIFIED TYPE: ICD-10-CM

## 2025-01-21 DIAGNOSIS — R32 URINARY INCONTINENCE, UNSPECIFIED TYPE: ICD-10-CM

## 2025-01-21 PROCEDURE — 1160F RVW MEDS BY RX/DR IN RCRD: CPT | Mod: ,,, | Performed by: OBSTETRICS & GYNECOLOGY

## 2025-01-21 PROCEDURE — 99024 POSTOP FOLLOW-UP VISIT: CPT | Mod: ,,, | Performed by: OBSTETRICS & GYNECOLOGY

## 2025-01-21 PROCEDURE — 1159F MED LIST DOCD IN RCRD: CPT | Mod: ,,, | Performed by: OBSTETRICS & GYNECOLOGY

## 2025-01-21 PROCEDURE — 3079F DIAST BP 80-89 MM HG: CPT | Mod: ,,, | Performed by: OBSTETRICS & GYNECOLOGY

## 2025-01-21 PROCEDURE — 99999 PR PBB SHADOW E&M-EST. PATIENT-LVL III: CPT | Mod: PBBFAC,,, | Performed by: OBSTETRICS & GYNECOLOGY

## 2025-01-21 PROCEDURE — 99213 OFFICE O/P EST LOW 20 MIN: CPT | Mod: PBBFAC | Performed by: OBSTETRICS & GYNECOLOGY

## 2025-01-21 PROCEDURE — 3077F SYST BP >= 140 MM HG: CPT | Mod: ,,, | Performed by: OBSTETRICS & GYNECOLOGY

## 2025-01-21 RX ORDER — HYDROXYZINE HYDROCHLORIDE 25 MG/1
25 TABLET, FILM COATED ORAL 4 TIMES DAILY PRN
Qty: 90 TABLET | Refills: 1 | Status: SHIPPED | OUTPATIENT
Start: 2025-01-21

## 2025-01-21 NOTE — PROGRESS NOTES
Return Gyn Office Visit    Assessment/Plan  Problem List Items Addressed This Visit       Encounter for postoperative care - Primary     Other Visit Diagnoses       Insomnia, unspecified type        Relevant Medications    hydrOXYzine HCL (ATARAX) 25 MG tablet    Urinary incontinence, unspecified type              Postoperatively she is doing well. Encouraged continued ambulation.  F/u with Urology as scheduled on 11/25/2025 for cystogram and visit. Discussed that she should get her Jackson catheter removed at that time. Attempted to get her in with Urology today, but not able to due to Dr. Perrin not being here.  Discussed that once the Jackson is removed, she should not have further urinary leakage. If this does, then we can re-assess at that time.  Hydroxyzine given prn insomnia/anxiety.   RTC in 4 weeks for 6 week postop visit and/or prn.    CC:   Chief Complaint   Patient presents with    Post-op Evaluation     Pt states she wants the jackson and staples out. Pt not sleeping well. No complaints at incision site.     HPI:  44 y.o. who presents to office for 2 week postop visit from a total robotic hysterectomy with bilateral salpingectomy and right oophorectomy with incidental cystotomy and repair by urology consisting of exploratory laparotomy and repair of cystotomy and cystoscopy with retrograde pyelogram.   She states that overall she is doing well. Pain is minimal and controlled with Motrin. She is no longer taking the Percocet. She is c/o urinary leakage around her jackson catheter. She woke up with urine on her sheets last night. She denies pain, but just feels like it is very uncomfortable. This is making it difficult for her to sleep at night. She usually wakes up 5-6 times with the sensation to void even though she has a Jackson in place. She strongly desires the Jackson catheter to be removed. Discussed that Urology needs to evaluate it as planned on Thursday.   She is getting very anxious and irritable due to  this. Offered sleep medication. She desires this.    She has intermittent light vaginal bleeding.     She is taking a laxative prn for constipation. She is ambulating at home. She rode a wheel chair in today.    Review of Systems - The following ROS was otherwise negative, except as noted in the HPI:  constitutional, respiratory, cardiovascular, gastrointestinal, genitourinary    Objective:  BP (!) 140/86 (BP Location: Left arm, Patient Position: Sitting)   Pulse 80   LMP 12/07/2024 (Exact Date)   General: Alert, well appearing, no acute distress  Abdomen: Soft, nontender, nondistended.  Incisions: 3 laparoscopic incisions are c/d/I. RLQ bandage from previous JUNITO drain removed. Incision is c/d. Steri-strip applied. Vertical skin incision from umbilicus to her pubic bone staples removed. Cleaned with 4x4's and sterile water. Steri-strips applied.   Pelvic: assessed Quintero catheter. Balloon in place. Not under tension. No current leakage around the Quintero.  Extremities: No redness or tenderness, neg Benjamin's sign  Psych: Normal mood and behavior. Irritable and tired.      Dawood Ma MD

## 2025-01-23 ENCOUNTER — OFFICE VISIT (OUTPATIENT)
Dept: UROLOGY | Facility: CLINIC | Age: 45
End: 2025-01-23
Payer: COMMERCIAL

## 2025-01-23 ENCOUNTER — HOSPITAL ENCOUNTER (OUTPATIENT)
Dept: RADIOLOGY | Facility: HOSPITAL | Age: 45
Discharge: HOME | End: 2025-01-23
Attending: UROLOGY
Payer: COMMERCIAL

## 2025-01-23 DIAGNOSIS — Z48.89 ENCOUNTER FOR POSTOPERATIVE CARE: ICD-10-CM

## 2025-01-23 DIAGNOSIS — Z90.710 S/P ABDOMINAL HYSTERECTOMY: ICD-10-CM

## 2025-01-23 DIAGNOSIS — S37.20XD INJURY OF BLADDER, SUBSEQUENT ENCOUNTER: ICD-10-CM

## 2025-01-23 DIAGNOSIS — N99.81 INTRAOPERATIVE BLADDER INJURY: Primary | ICD-10-CM

## 2025-01-23 PROCEDURE — 72192 CT PELVIS W/O DYE: CPT | Mod: TC

## 2025-01-23 PROCEDURE — 72192 CT PELVIS W/O DYE: CPT | Mod: 26,,, | Performed by: RADIOLOGY

## 2025-01-23 PROCEDURE — 99213 OFFICE O/P EST LOW 20 MIN: CPT | Mod: PBBFAC,25 | Performed by: UROLOGY

## 2025-01-23 PROCEDURE — 99999 PR PBB SHADOW E&M-EST. PATIENT-LVL III: CPT | Mod: PBBFAC,,, | Performed by: UROLOGY

## 2025-01-23 PROCEDURE — 1159F MED LIST DOCD IN RCRD: CPT | Mod: ,,, | Performed by: UROLOGY

## 2025-01-23 PROCEDURE — 1160F RVW MEDS BY RX/DR IN RCRD: CPT | Mod: ,,, | Performed by: UROLOGY

## 2025-01-23 PROCEDURE — 1111F DSCHRG MED/CURRENT MED MERGE: CPT | Mod: ,,, | Performed by: UROLOGY

## 2025-01-23 PROCEDURE — 99024 POSTOP FOLLOW-UP VISIT: CPT | Mod: S$PBB,,, | Performed by: UROLOGY

## 2025-01-23 RX ORDER — DIAZEPAM 5 MG/1
5 TABLET ORAL EVERY 8 HOURS PRN
Qty: 21 TABLET | Refills: 0 | Status: SHIPPED | OUTPATIENT
Start: 2025-01-23 | End: 2025-01-30

## 2025-01-23 RX ORDER — IOPAMIDOL 755 MG/ML
100 INJECTION, SOLUTION INTRAVASCULAR
Status: DISCONTINUED | OUTPATIENT
Start: 2025-01-23 | End: 2025-01-24 | Stop reason: HOSPADM

## 2025-01-23 NOTE — PROGRESS NOTES
Assessment:   1. Intraoperative bladder injury  -     diazePAM (VALIUM) 5 MG tablet; Take 1 tablet (5 mg total) by mouth every 8 (eight) hours as needed (bladder spasms).  Dispense: 21 tablet; Refill: 0    2. S/P abdominal hysterectomy    3. Encounter for postoperative care         Plan:     I assisted Radiology with a cystogram this morning and every time I filled the bladder she leaked the contrast out around the catheter with a spasm.  We discussed that with the limitations of the CT cystogram it looks as though the bladder has healed but that I want to maintain the Quintero catheter in for 1 more week given that we were not able to do a cystogram.  I started her on Valium to help with some of the bladder spasms.  Return next week for catheter removal and trial of voiding.           Gwyn Perrin MD  Urology  01/23/2025          UROLOGY HISTORY AND PHYSICAL EXAM    Subjective:      Patient ID: Haleigh Newman is a 44 y.o. female.    Chief Complaint::   Follow-up  Associated symptoms include abdominal pain. Pertinent negatives include no chills, fever or vomiting.     The patient is a 44 year old female that presented today for CT cystogram.  I met the patient down in Radiology and instilled contrast into the catheter for CT cystogram and every time I would instill approximately 100 mL she would have a bladder spasm and was not able to hold the contrast in the bladder despite the catheter in place.  She reports she did have some urgency and frequency prior to the surgery and was told she has overactive bladder.     Past Medical History:   Diagnosis Date    Anxiety with depression     Carboxyhemoglobinemia     says she smells it when she drives her car    Essential (primary) hypertension     Migraine without status migrainosus, not intractable 01/09/2024    Nicotine dependence     SUDHEER on CPAP      Past Surgical History:   Procedure Laterality Date    BILATERAL TUBAL LIGATION      BLADDER REPAIR  01/08/2025      Aydin    BLADDER REPAIR  2025    Procedure: REPAIR, BLADDER.OPEN;  Surgeon: Gwyn Perrin MD;  Location: [facility] OR;  Service: Urology;;     SECTION      CYSTOSCOPY W/ RETROGRADES Bilateral 2025    Procedure: CYSTOSCOPY, WITH RETROGRADE PYELOGRAM;  Surgeon: Gwyn Perrin MD;  Location: [facility] OR;  Service: Urology;  Laterality: Bilateral;    LAPAROTOMY, EXPLORATORY  2025    Procedure: LAPAROTOMY, EXPLORATORY;  Surgeon: Gwyn Perrin MD;  Location: [facility] OR;  Service: Urology;;    LYSIS OF ADHESIONS  2025    Dr. Taylor    ROBOT-ASSISTED LAPAROSCOPIC LYSIS OF ADHESIONS USING DA VIANCA XI  2025    Procedure: XI ROBOTIC LYSIS, ADHESIONS;  Surgeon: Dawood Ma MD;  Location: [facility] OR;  Service: OB/GYN;;    ROBOTIC HYSTERECTOMY, WITH SALPINGO-OOPHORECTOMY Bilateral 2025    Dr. Dawood COVARRUBIAS ROBOTIC HYSTERECTOMY, WITH SALPINGO-OOPHORECTOMY Bilateral 2025    Procedure: XI ROBOTIC HYSTERECTOMY,WITH BILATERAL SALPINGO-OOPHORECTOMY,RIGHT OVARY;  Surgeon: Dawood Ma MD;  Location: [facility];  Service: OB/GYN;  Laterality: Bilateral;        Current Outpatient Medications on File Prior to Visit   Medication Sig Dispense Refill    amLODIPine (NORVASC) 5 MG tablet Take 1 tablet (5 mg total) by mouth once daily. 30 tablet 0    cefdinir (OMNICEF) 300 MG capsule Take 1 capsule (300 mg total) by mouth 2 (two) times a day for 10 days 20 capsule 0    EScitalopram oxalate (LEXAPRO) 10 MG tablet Take 10 mg by mouth once daily.      hydrALAZINE (APRESOLINE) 25 MG tablet Take 1 tablet (25 mg total) by mouth every 8 (eight) hours as needed for blood pressure (SBP >160.). 90 tablet 0    HYDROcodone-acetaminophen (NORCO)  mg per tablet Take 1 tablet by mouth every 4 (four) hours as needed for Pain. 14 tablet 0    hydrOXYzine HCL (ATARAX) 25 MG tablet Take 1 tablet (25 mg total) by mouth 4 (four) times daily as needed for Anxiety (and insomnia). 90 tablet 1     ibuprofen (ADVIL,MOTRIN) 800 MG tablet Take 1 tablet (800 mg total) by mouth every 8 (eight) hours as needed for pain. 60 tablet 1    losartan (COZAAR) 100 MG tablet Take 1 tablet (100 mg total) by mouth once daily. 90 tablet 3    metoprolol succinate (TOPROL-XL) 50 MG 24 hr tablet Take 1 tablet (50 mg total) by mouth once daily. 90 tablet 3    oxyCODONE-acetaminophen (PERCOCET)  mg per tablet Take 1 tablet by mouth every 4 (four) hours as needed for Pain. 30 tablet 0    sumatriptan (IMITREX) 100 MG tablet Take 1 tablet at onset of headache.  May repeat in 2 hours.  No more than 2 tabs per 24 hours.  No more than 3 days of the week 10 tablet 2     Current Facility-Administered Medications on File Prior to Visit   Medication Dose Route Frequency Provider Last Rate Last Admin    iopamidoL (ISOVUE-370) injection 100 mL  100 mL Intravenous ONCE PRN Gwyn Perrin MD            Review of patient's allergies indicates:  No Known Allergies  There were no vitals filed for this visit.       Review of Systems   Constitutional:  Negative for chills and fever.   Gastrointestinal:  Positive for abdominal pain. Negative for vomiting.   Genitourinary:  Positive for pelvic pain. Negative for hematuria.      Objective:     Physical Exam  Constitutional:       Appearance: She is not ill-appearing or diaphoretic.   Cardiovascular:      Rate and Rhythm: Normal rate.   Pulmonary:      Effort: Pulmonary effort is normal. No respiratory distress.   Abdominal:      General: There is no distension.      Palpations: There is no mass.      Tenderness: There is no abdominal tenderness.      Comments: Incisions are dressed.  No erythema   Genitourinary:     Comments: The urine is clear yellow.  Twenty-four French catheter in place  Skin:     General: Skin is warm.   Neurological:      Mental Status: She is alert. Mental status is at baseline.   Psychiatric:         Mood and Affect: Mood normal.          CMP  Sodium   Date Value Ref  Range Status   01/14/2025 136 136 - 145 mmol/L Final     Potassium   Date Value Ref Range Status   01/14/2025 4.2 3.5 - 5.1 mmol/L Final     Chloride   Date Value Ref Range Status   01/14/2025 103 98 - 107 mmol/L Final     CO2   Date Value Ref Range Status   01/14/2025 26 22 - 29 mmol/L Final     Glucose   Date Value Ref Range Status   01/14/2025 94 74 - 100 mg/dL Final     BUN   Date Value Ref Range Status   01/14/2025 7 7 - 19 mg/dL Final     Creatinine   Date Value Ref Range Status   01/14/2025 0.82 0.55 - 1.02 mg/dL Final     Calcium   Date Value Ref Range Status   01/14/2025 9.0 8.4 - 10.2 mg/dL Final     Total Protein   Date Value Ref Range Status   12/22/2024 7.7 6.4 - 8.3 g/dL Final     Albumin   Date Value Ref Range Status   12/22/2024 3.8 3.5 - 5.0 g/dL Final     Bilirubin, Total   Date Value Ref Range Status   12/22/2024 0.4 <=1.5 mg/dL Final     Alk Phos   Date Value Ref Range Status   12/22/2024 76 40 - 150 U/L Final     AST   Date Value Ref Range Status   12/22/2024 27 5 - 34 U/L Final     ALT   Date Value Ref Range Status   12/22/2024 22 <=55 U/L Final     Anion Gap   Date Value Ref Range Status   01/14/2025 11 7 - 16 mmol/L Final     eGFR   Date Value Ref Range Status   01/14/2025 91 >=60 mL/min/1.73m2 Final      BMP  Lab Results   Component Value Date     01/14/2025    K 4.2 01/14/2025     01/14/2025    CO2 26 01/14/2025    BUN 7 01/14/2025    CREATININE 0.82 01/14/2025    CALCIUM 9.0 01/14/2025    ANIONGAP 11 01/14/2025    EGFRNORACEVR 91 01/14/2025

## 2025-01-28 ENCOUNTER — OFFICE VISIT (OUTPATIENT)
Dept: UROLOGY | Facility: CLINIC | Age: 45
End: 2025-01-28
Payer: COMMERCIAL

## 2025-01-28 VITALS
HEIGHT: 64 IN | WEIGHT: 211 LBS | SYSTOLIC BLOOD PRESSURE: 155 MMHG | OXYGEN SATURATION: 98 % | BODY MASS INDEX: 36.02 KG/M2 | DIASTOLIC BLOOD PRESSURE: 86 MMHG | HEART RATE: 78 BPM

## 2025-01-28 DIAGNOSIS — N99.81 INTRAOPERATIVE BLADDER INJURY: Primary | ICD-10-CM

## 2025-01-28 PROCEDURE — 1111F DSCHRG MED/CURRENT MED MERGE: CPT | Mod: ,,, | Performed by: UROLOGY

## 2025-01-28 PROCEDURE — 1160F RVW MEDS BY RX/DR IN RCRD: CPT | Mod: ,,, | Performed by: UROLOGY

## 2025-01-28 PROCEDURE — 3008F BODY MASS INDEX DOCD: CPT | Mod: ,,, | Performed by: UROLOGY

## 2025-01-28 PROCEDURE — 1159F MED LIST DOCD IN RCRD: CPT | Mod: ,,, | Performed by: UROLOGY

## 2025-01-28 PROCEDURE — 99024 POSTOP FOLLOW-UP VISIT: CPT | Mod: S$PBB,,, | Performed by: UROLOGY

## 2025-01-28 PROCEDURE — 99214 OFFICE O/P EST MOD 30 MIN: CPT | Mod: PBBFAC | Performed by: UROLOGY

## 2025-01-28 PROCEDURE — 3077F SYST BP >= 140 MM HG: CPT | Mod: ,,, | Performed by: UROLOGY

## 2025-01-28 PROCEDURE — 3079F DIAST BP 80-89 MM HG: CPT | Mod: ,,, | Performed by: UROLOGY

## 2025-01-28 PROCEDURE — 99999 PR PBB SHADOW E&M-EST. PATIENT-LVL IV: CPT | Mod: PBBFAC,,, | Performed by: UROLOGY

## 2025-01-28 NOTE — PROGRESS NOTES
Assessment:   1. Intraoperative bladder injury  -     vibegron 75 mg Tab; Take 1 tablet (75 mg total) by mouth once daily. for 21 days  Dispense: 21 tablet; Refill: 0         Plan:     The catheter was removed and the patient was counseled on the use of selective beta agonist therapy to help with spasms, bladder capacity and urgency. Plan to have her return as scheduled in 2 to 3 weeks to check symptoms and her progress.             Gwyn Perrin MD  Urology  2025          UROLOGY HISTORY AND PHYSICAL EXAM    Subjective:      Patient ID: Haleigh Newman is a 44 y.o. female.    Chief Complaint::   Follow-up  Pertinent negatives include no abdominal pain.     The patient presents today for catheter removal.  Her urine is clear yellow and she is doing well.     Past Medical History:   Diagnosis Date    Anxiety with depression     Carboxyhemoglobinemia     says she smells it when she drives her car    Essential (primary) hypertension     Migraine without status migrainosus, not intractable 2024    Nicotine dependence     SUDHEER on CPAP      Past Surgical History:   Procedure Laterality Date    BILATERAL TUBAL LIGATION      BLADDER REPAIR  2025    Dr. Perrin    BLADDER REPAIR  2025    Procedure: REPAIR, BLADDER.OPEN;  Surgeon: Gwyn Perrin MD;  Location: [facility] OR;  Service: Urology;;     SECTION      CYSTOSCOPY W/ RETROGRADES Bilateral 2025    Procedure: CYSTOSCOPY, WITH RETROGRADE PYELOGRAM;  Surgeon: Gwyn Perrin MD;  Location: [facility] OR;  Service: Urology;  Laterality: Bilateral;    LAPAROTOMY, EXPLORATORY  2025    Procedure: LAPAROTOMY, EXPLORATORY;  Surgeon: Gwyn Perrin MD;  Location: [facility] OR;  Service: Urology;;    LYSIS OF ADHESIONS  2025    Dr. Taylor    ROBOT-ASSISTED LAPAROSCOPIC LYSIS OF ADHESIONS USING DA VIANCA XI  2025    Procedure: XI ROBOTIC LYSIS, ADHESIONS;  Surgeon: aDwood Ma MD;  Location: [facility] OR;  Service: OB/GYN;;     ROBOTIC HYSTERECTOMY, WITH SALPINGO-OOPHORECTOMY Bilateral 01/08/2025    Dr. Dawood Ma    XI ROBOTIC HYSTERECTOMY, WITH SALPINGO-OOPHORECTOMY Bilateral 1/8/2025    Procedure: XI ROBOTIC HYSTERECTOMY,WITH BILATERAL SALPINGO-OOPHORECTOMY,RIGHT OVARY;  Surgeon: Dawood Ma MD;  Location: [facility];  Service: OB/GYN;  Laterality: Bilateral;        Current Outpatient Medications on File Prior to Visit   Medication Sig Dispense Refill    amLODIPine (NORVASC) 5 MG tablet Take 1 tablet (5 mg total) by mouth once daily. 30 tablet 0    EScitalopram oxalate (LEXAPRO) 10 MG tablet Take 10 mg by mouth once daily.      hydrALAZINE (APRESOLINE) 25 MG tablet Take 1 tablet (25 mg total) by mouth every 8 (eight) hours as needed for blood pressure (SBP >160.). 90 tablet 0    losartan (COZAAR) 100 MG tablet Take 1 tablet (100 mg total) by mouth once daily. 90 tablet 3    metoprolol succinate (TOPROL-XL) 50 MG 24 hr tablet Take 1 tablet (50 mg total) by mouth once daily. 90 tablet 3    sumatriptan (IMITREX) 100 MG tablet Take 1 tablet at onset of headache.  May repeat in 2 hours.  No more than 2 tabs per 24 hours.  No more than 3 days of the week 10 tablet 2    diazePAM (VALIUM) 5 MG tablet Take 1 tablet (5 mg total) by mouth every 8 (eight) hours as needed (bladder spasms). (Patient not taking: Reported on 1/28/2025) 21 tablet 0    HYDROcodone-acetaminophen (NORCO)  mg per tablet Take 1 tablet by mouth every 4 (four) hours as needed for Pain. (Patient not taking: Reported on 1/28/2025) 14 tablet 0    hydrOXYzine HCL (ATARAX) 25 MG tablet Take 1 tablet (25 mg total) by mouth 4 (four) times daily as needed for Anxiety (and insomnia). (Patient not taking: Reported on 1/28/2025) 90 tablet 1    ibuprofen (ADVIL,MOTRIN) 800 MG tablet Take 1 tablet (800 mg total) by mouth every 8 (eight) hours as needed for pain. (Patient not taking: Reported on 1/28/2025) 60 tablet 1    oxyCODONE-acetaminophen (PERCOCET)  mg per tablet  Take 1 tablet by mouth every 4 (four) hours as needed for Pain. (Patient not taking: Reported on 1/28/2025) 30 tablet 0     No current facility-administered medications on file prior to visit.        Review of patient's allergies indicates:  No Known Allergies  Vitals:    01/28/25 1416   BP: (!) 155/86   Pulse: 78          Review of Systems   Constitutional:  Negative for activity change.   Gastrointestinal:  Negative for abdominal pain.   Genitourinary:  Negative for decreased urine volume and hematuria.      Objective:     Physical Exam  Vitals reviewed. Exam conducted with a chaperone present.   Constitutional:       Appearance: She is not ill-appearing.   Cardiovascular:      Rate and Rhythm: Normal rate.   Pulmonary:      Effort: Pulmonary effort is normal. No respiratory distress.   Abdominal:      General: There is no distension.      Tenderness: There is no abdominal tenderness.   Genitourinary:     General: Normal vulva.      Comments: The catheter was removed intact.   Musculoskeletal:         General: Normal range of motion.   Skin:     General: Skin is warm.   Neurological:      Mental Status: She is alert. Mental status is at baseline.   Psychiatric:         Mood and Affect: Mood normal.         Behavior: Behavior normal.         Thought Content: Thought content normal.          CMP  Sodium   Date Value Ref Range Status   01/14/2025 136 136 - 145 mmol/L Final     Potassium   Date Value Ref Range Status   01/14/2025 4.2 3.5 - 5.1 mmol/L Final     Chloride   Date Value Ref Range Status   01/14/2025 103 98 - 107 mmol/L Final     CO2   Date Value Ref Range Status   01/14/2025 26 22 - 29 mmol/L Final     Glucose   Date Value Ref Range Status   01/14/2025 94 74 - 100 mg/dL Final     BUN   Date Value Ref Range Status   01/14/2025 7 7 - 19 mg/dL Final     Creatinine   Date Value Ref Range Status   01/14/2025 0.82 0.55 - 1.02 mg/dL Final     Calcium   Date Value Ref Range Status   01/14/2025 9.0 8.4 - 10.2 mg/dL  Final     Total Protein   Date Value Ref Range Status   12/22/2024 7.7 6.4 - 8.3 g/dL Final     Albumin   Date Value Ref Range Status   12/22/2024 3.8 3.5 - 5.0 g/dL Final     Bilirubin, Total   Date Value Ref Range Status   12/22/2024 0.4 <=1.5 mg/dL Final     Alk Phos   Date Value Ref Range Status   12/22/2024 76 40 - 150 U/L Final     AST   Date Value Ref Range Status   12/22/2024 27 5 - 34 U/L Final     ALT   Date Value Ref Range Status   12/22/2024 22 <=55 U/L Final     Anion Gap   Date Value Ref Range Status   01/14/2025 11 7 - 16 mmol/L Final     eGFR   Date Value Ref Range Status   01/14/2025 91 >=60 mL/min/1.73m2 Final      BMP  Lab Results   Component Value Date     01/14/2025    K 4.2 01/14/2025     01/14/2025    CO2 26 01/14/2025    BUN 7 01/14/2025    CREATININE 0.82 01/14/2025    CALCIUM 9.0 01/14/2025    ANIONGAP 11 01/14/2025    EGFRNORACEVR 91 01/14/2025

## 2025-01-31 ENCOUNTER — TELEPHONE (OUTPATIENT)
Dept: UROLOGY | Facility: CLINIC | Age: 45
End: 2025-01-31
Payer: COMMERCIAL

## 2025-01-31 NOTE — TELEPHONE ENCOUNTER
"----- Message from Brianna sent at 1/31/2025 11:47 AM CST -----  Regarding: Pt. advice  Who Called: Haleigh Newman    Caller is requesting assistance/information from provider's office.    Symptoms (please be specific): lower mid left stomach pain  How long has patient had these symptoms:  2 days      Preferred Method of Contact: Phone Call  Patient's Preferred Phone Number on File: [phone]     Additional Information: Pt. Wants to speak to nurse about new symptoms  I called pt and spoke with her.  She reports that she is having some "heaviness" all across the abdominal area.  She denies any pain, says she has no other complaints at this time.  Says when she lifts that area up with her hands, it feels better.  I told her I will relay this complaint to Dr Perrin and let her know what he says.  I discussed with him and he wants her to come in Monday to clinic so he can check and evaluate her for possible infection.  I relayed this to pt an d she is set 2/3/25 at 9:30am.  She said she will be here.  "
poor plus

## 2025-02-18 ENCOUNTER — OFFICE VISIT (OUTPATIENT)
Dept: UROLOGY | Facility: CLINIC | Age: 45
End: 2025-02-18
Payer: COMMERCIAL

## 2025-02-18 VITALS
SYSTOLIC BLOOD PRESSURE: 151 MMHG | HEART RATE: 85 BPM | WEIGHT: 211 LBS | HEIGHT: 64 IN | DIASTOLIC BLOOD PRESSURE: 69 MMHG | OXYGEN SATURATION: 99 % | BODY MASS INDEX: 36.02 KG/M2

## 2025-02-18 DIAGNOSIS — Z90.710 S/P ABDOMINAL HYSTERECTOMY: Primary | ICD-10-CM

## 2025-02-18 DIAGNOSIS — N99.81 INTRAOPERATIVE BLADDER INJURY: ICD-10-CM

## 2025-02-18 PROCEDURE — 99214 OFFICE O/P EST MOD 30 MIN: CPT | Mod: PBBFAC | Performed by: UROLOGY

## 2025-02-18 RX ORDER — DIAZEPAM 5 MG/1
5 TABLET ORAL EVERY 12 HOURS PRN
Qty: 60 TABLET | Refills: 0 | Status: SHIPPED | OUTPATIENT
Start: 2025-02-18 | End: 2025-03-20

## 2025-02-18 NOTE — PROGRESS NOTES
Assessment:   1. S/P abdominal hysterectomy  -     diazePAM (VALIUM) 5 MG tablet; Take 1 tablet (5 mg total) by mouth every 12 (twelve) hours as needed (bladder spasms).  Dispense: 60 tablet; Refill: 0    2. Intraoperative bladder injury  -     diazePAM (VALIUM) 5 MG tablet; Take 1 tablet (5 mg total) by mouth every 12 (twelve) hours as needed (bladder spasms).  Dispense: 60 tablet; Refill: 0         Plan:     The patient's urinalysis today demonstrates trace blood and she is not having any gross hematuria and her urge urinary incontinence is also improving.  She feels as though she can be continent now and with the exception of intermittent bladder spasms is doing well.  We discussed plans to continue the Valium as needed for bladder spasms and for her to take 1 tablet by mouth every 12 hours as needed.  I asked for her to let me know in the next month if she needed a refill and wanted to continue the Valium as we slowly taper her off this as the bladder starts to completely heal.  She can contact us in 4 weeks if she is doing well and we can refill her medicine.             Gwyn Perrin MD  Urology  02/18/2025          UROLOGY HISTORY AND PHYSICAL EXAM    Subjective:      Patient ID: Haleigh Newman is a 44 y.o. female.    Chief Complaint::   HPI    The patient is a 44-year-old female that presents today after a bladder injury and Quintero catheter placement.  She reports her urge urinary incontinence has improved significantly she is now not having frequent urination not having urge incontinence not having stress incontinence and denies any emptying her bladder.  She reports that she has not have any gross hematuria but on occasion has intermittent lower abdominal pain consistent with bladder spasms that respond well to Valium.     Past Medical History:   Diagnosis Date    Anxiety with depression     Carboxyhemoglobinemia     says she smells it when she drives her car    Essential (primary) hypertension      Migraine without status migrainosus, not intractable 2024    Nicotine dependence     SUDHEER on CPAP      Past Surgical History:   Procedure Laterality Date    BILATERAL TUBAL LIGATION      BLADDER REPAIR  2025    Dr. Perrin    BLADDER REPAIR  2025    Procedure: REPAIR, BLADDER.OPEN;  Surgeon: Gwyn Perrin MD;  Location: [facility];  Service: Urology;;     SECTION      CYSTOSCOPY W/ RETROGRADES Bilateral 2025    Procedure: CYSTOSCOPY, WITH RETROGRADE PYELOGRAM;  Surgeon: Gwyn Perrin MD;  Location: [facility] OR;  Service: Urology;  Laterality: Bilateral;    LAPAROTOMY, EXPLORATORY  2025    Procedure: LAPAROTOMY, EXPLORATORY;  Surgeon: Gwyn Perrin MD;  Location: [facility] OR;  Service: Urology;;    LYSIS OF ADHESIONS  2025    Dr. Taylor    ROBOT-ASSISTED LAPAROSCOPIC LYSIS OF ADHESIONS USING DA VIANCA XI  2025    Procedure: XI ROBOTIC LYSIS, ADHESIONS;  Surgeon: Dawood Ma MD;  Location: [facility];  Service: OB/GYN;;    ROBOTIC HYSTERECTOMY, WITH SALPINGO-OOPHORECTOMY Bilateral 2025    Dr. Dawood Ma    XI ROBOTIC HYSTERECTOMY, WITH SALPINGO-OOPHORECTOMY Bilateral 2025    Procedure: XI ROBOTIC HYSTERECTOMY,WITH BILATERAL SALPINGO-OOPHORECTOMY,RIGHT OVARY;  Surgeon: Dawood Ma MD;  Location: [facility];  Service: OB/GYN;  Laterality: Bilateral;        Medications Ordered Prior to Encounter[1]     Review of patient's allergies indicates:  No Known Allergies  Vitals:    25 1404   BP: (!) 151/69   Pulse: 85          Review of Systems   Constitutional:  Negative for activity change and fever.   Gastrointestinal:  Negative for abdominal distention, abdominal pain and vomiting.   Endocrine: Negative for polyuria.   Genitourinary:  Negative for dysuria, hematuria and urgency.   Skin:  Negative for wound.   Neurological:  Negative for headaches.   Psychiatric/Behavioral:  Negative for sleep disturbance.       Objective:     Physical  Exam  Vitals and nursing note reviewed.   Constitutional:       General: She is not in acute distress.     Appearance: She is well-developed. She is not diaphoretic.   HENT:      Head: Normocephalic.   Eyes:      Conjunctiva/sclera: Conjunctivae normal.   Cardiovascular:      Rate and Rhythm: Normal rate.   Pulmonary:      Effort: Pulmonary effort is normal. No respiratory distress.      Breath sounds: No wheezing.   Abdominal:      General: There is no distension.      Palpations: Abdomen is soft.      Tenderness: There is no abdominal tenderness.   Musculoskeletal:         General: No tenderness or deformity.   Skin:     General: Skin is warm.      Findings: No erythema or rash.   Neurological:      Mental Status: She is alert and oriented to person, place, and time.      Motor: No abnormal muscle tone.   Psychiatric:         Attention and Perception: Attention normal.         Mood and Affect: Affect normal.         Speech: Speech normal.         Behavior: Behavior normal.         Thought Content: Thought content normal.         Cognition and Memory: Cognition and memory normal.         Judgment: Judgment normal.        CMP  Sodium   Date Value Ref Range Status   01/14/2025 136 136 - 145 mmol/L Final     Potassium   Date Value Ref Range Status   01/14/2025 4.2 3.5 - 5.1 mmol/L Final     Chloride   Date Value Ref Range Status   01/14/2025 103 98 - 107 mmol/L Final     CO2   Date Value Ref Range Status   01/14/2025 26 22 - 29 mmol/L Final     Glucose   Date Value Ref Range Status   01/14/2025 94 74 - 100 mg/dL Final     BUN   Date Value Ref Range Status   01/14/2025 7 7 - 19 mg/dL Final     Creatinine   Date Value Ref Range Status   01/14/2025 0.82 0.55 - 1.02 mg/dL Final     Calcium   Date Value Ref Range Status   01/14/2025 9.0 8.4 - 10.2 mg/dL Final     Total Protein   Date Value Ref Range Status   12/22/2024 7.7 6.4 - 8.3 g/dL Final     Albumin   Date Value Ref Range Status   12/22/2024 3.8 3.5 - 5.0 g/dL Final      Bilirubin, Total   Date Value Ref Range Status   12/22/2024 0.4 <=1.5 mg/dL Final     Alk Phos   Date Value Ref Range Status   12/22/2024 76 40 - 150 U/L Final     AST   Date Value Ref Range Status   12/22/2024 27 5 - 34 U/L Final     ALT   Date Value Ref Range Status   12/22/2024 22 <=55 U/L Final     Anion Gap   Date Value Ref Range Status   01/14/2025 11 7 - 16 mmol/L Final     eGFR   Date Value Ref Range Status   01/14/2025 91 >=60 mL/min/1.73m2 Final      BMP  Lab Results   Component Value Date     01/14/2025    K 4.2 01/14/2025     01/14/2025    CO2 26 01/14/2025    BUN 7 01/14/2025    CREATININE 0.82 01/14/2025    CALCIUM 9.0 01/14/2025    ANIONGAP 11 01/14/2025    EGFRNORACEVR 91 01/14/2025              [1]   Current Outpatient Medications on File Prior to Visit   Medication Sig Dispense Refill    EScitalopram oxalate (LEXAPRO) 10 MG tablet Take 10 mg by mouth once daily.      losartan (COZAAR) 100 MG tablet Take 1 tablet (100 mg total) by mouth once daily. 90 tablet 3    metoprolol succinate (TOPROL-XL) 50 MG 24 hr tablet Take 1 tablet (50 mg total) by mouth once daily. 90 tablet 3    sumatriptan (IMITREX) 100 MG tablet Take 1 tablet at onset of headache.  May repeat in 2 hours.  No more than 2 tabs per 24 hours.  No more than 3 days of the week 10 tablet 2    vibegron 75 mg Tab Take 1 tablet (75 mg total) by mouth once daily. for 21 days 21 tablet 0    amLODIPine (NORVASC) 5 MG tablet Take 1 tablet (5 mg total) by mouth once daily. 30 tablet 0    hydrALAZINE (APRESOLINE) 25 MG tablet Take 1 tablet (25 mg total) by mouth every 8 (eight) hours as needed for blood pressure (SBP >160.). 90 tablet 0    HYDROcodone-acetaminophen (NORCO)  mg per tablet Take 1 tablet by mouth every 4 (four) hours as needed for Pain. (Patient not taking: Reported on 2/18/2025) 14 tablet 0    hydrOXYzine HCL (ATARAX) 25 MG tablet Take 1 tablet (25 mg total) by mouth 4 (four) times daily as needed for Anxiety  (and insomnia). (Patient not taking: Reported on 2/18/2025) 90 tablet 1    ibuprofen (ADVIL,MOTRIN) 800 MG tablet Take 1 tablet (800 mg total) by mouth every 8 (eight) hours as needed for pain. (Patient not taking: Reported on 2/18/2025) 60 tablet 1    oxyCODONE-acetaminophen (PERCOCET)  mg per tablet Take 1 tablet by mouth every 4 (four) hours as needed for Pain. (Patient not taking: Reported on 2/18/2025) 30 tablet 0    [DISCONTINUED] diazePAM (VALIUM) 5 MG tablet Take 1 tablet (5 mg total) by mouth every 8 (eight) hours as needed (bladder spasms). (Patient not taking: Reported on 1/28/2025) 21 tablet 0     No current facility-administered medications on file prior to visit.

## 2025-02-27 ENCOUNTER — OFFICE VISIT (OUTPATIENT)
Dept: OBSTETRICS AND GYNECOLOGY | Facility: CLINIC | Age: 45
End: 2025-02-27
Payer: COMMERCIAL

## 2025-02-27 VITALS
DIASTOLIC BLOOD PRESSURE: 66 MMHG | HEART RATE: 78 BPM | HEIGHT: 64 IN | SYSTOLIC BLOOD PRESSURE: 137 MMHG | BODY MASS INDEX: 36.09 KG/M2 | WEIGHT: 211.38 LBS

## 2025-02-27 DIAGNOSIS — N89.8 VAGINAL DISCHARGE: Primary | ICD-10-CM

## 2025-02-27 DIAGNOSIS — Z48.89 ENCOUNTER FOR POSTOPERATIVE CARE: ICD-10-CM

## 2025-02-27 DIAGNOSIS — Z12.11 ENCOUNTER FOR SCREENING COLONOSCOPY: ICD-10-CM

## 2025-02-27 LAB
BACTERIAL VAGINOSIS DNA (OHS): POSITIVE
CANDIDA GLABRATA/KRUSEI DNA (OHS): DETECTED
CANDIDA SPECIES DNA (OHS): NOT DETECTED
TRICHOMONAS VAGINALIS DNA (OHS): NOT DETECTED

## 2025-02-27 PROCEDURE — 4010F ACE/ARB THERAPY RXD/TAKEN: CPT | Mod: ,,, | Performed by: OBSTETRICS & GYNECOLOGY

## 2025-02-27 PROCEDURE — 1160F RVW MEDS BY RX/DR IN RCRD: CPT | Mod: ,,, | Performed by: OBSTETRICS & GYNECOLOGY

## 2025-02-27 PROCEDURE — 99999 PR PBB SHADOW E&M-EST. PATIENT-LVL IV: CPT | Mod: PBBFAC,,, | Performed by: OBSTETRICS & GYNECOLOGY

## 2025-02-27 PROCEDURE — 3078F DIAST BP <80 MM HG: CPT | Mod: ,,, | Performed by: OBSTETRICS & GYNECOLOGY

## 2025-02-27 PROCEDURE — 81515 NFCT DS BV&VAGINITIS DNA ALG: CPT | Mod: QW,,, | Performed by: CLINICAL MEDICAL LABORATORY

## 2025-02-27 PROCEDURE — 1159F MED LIST DOCD IN RCRD: CPT | Mod: ,,, | Performed by: OBSTETRICS & GYNECOLOGY

## 2025-02-27 PROCEDURE — 99214 OFFICE O/P EST MOD 30 MIN: CPT | Mod: PBBFAC | Performed by: OBSTETRICS & GYNECOLOGY

## 2025-02-27 PROCEDURE — 3075F SYST BP GE 130 - 139MM HG: CPT | Mod: ,,, | Performed by: OBSTETRICS & GYNECOLOGY

## 2025-02-27 PROCEDURE — 99024 POSTOP FOLLOW-UP VISIT: CPT | Mod: ,,, | Performed by: OBSTETRICS & GYNECOLOGY

## 2025-02-27 NOTE — LETTER
February 27, 2025      [facility]  [address] [place] [address]  Phone: [phone]  Fax: [phone]       Patient: Haleigh Newman   YOB: 1980  Date of Visit: 02/27/2025    To Whom It May Concern:    Ozzy Newman  was at [facility] on 02/27/2025. The patient has been udner our care and may return to work/school on 03/02/2025 with no restrictions. If you have any questions or concerns, or if I can be of further assistance, please do not hesitate to contact me.    Sincerely,    Mansi Husain LPN

## 2025-02-27 NOTE — PROGRESS NOTES
"Return Gyn Office Visit    Assessment/Plan  Problem List Items Addressed This Visit       Encounter for postoperative care     Other Visit Diagnoses         Vaginal discharge    -  Primary    Relevant Orders    Vaginosis Screen by DNA Probe (Completed)      Encounter for screening colonoscopy        Relevant Orders    Colonoscopy          She is doing well postoperatively.  Continued pelvic rest x 2 more weeks recommended.   Vaginitis swab obtained.   Colonoscopy ordered as she has not done this yet and she will be 46 yo in July.    RTC in 1 year for annual exam and/or prn.    Call/return sooner if vasomotor symptoms worsen.    CC:   Chief Complaint   Patient presents with    Follow-up     Pt is being seen today for a 4wk follow up visit. She states she has no concerns other hot flashes. Pt states she would like a mammo.      HPI:  44 y.o. who presents to office for 7 weeks status post total robotic hysterectomy with bilateral salpingectomy and right oophorectomy; 7 weeks status post with incidental cystotomy and repair by urology consisting of exploratory laparotomy and repair of cystotomy and cystoscopy with retrograde pyelogram. Denies any pain or drainage from incision. Denies any urinary issues; using medication for bladder spasms as needed. Still having issues with bowel movements. Using Ducolax every other day. Patient is walking in preparation to go back to work. Denies any gait issues.     She is having some hot flashes and night sweats, but declines any HRT at this time.    Review of Systems - The following ROS was otherwise negative, except as noted in the HPI:  constitutional, respiratory, cardiovascular, gastrointestinal, genitourinary    Objective:  /66 (BP Location: Right arm, Patient Position: Sitting)   Pulse 78   Ht 5' 4" (1.626 m)   Wt 95.9 kg (211 lb 6.4 oz)   LMP 12/07/2024 (Exact Date)   BMI 36.29 kg/m²   General: Alert, well appearing, no acute distress  Abdomen: Soft, nontender, " nondistended   Inc: Vertical incision c/d/I. 4 robotic incisions c/d/I x 4.   Pelvic: Normal External genitalia without masses or lesions.  Moist, pink vagina with moderate amount white discharge. Vaginal cuff intact. Cervix and uterus surgically absent. Adnexa palpated bilaterally without masses or tenderness.  Bimanual examination without masses or tenderness.  Exam chaperoned by female assistant  Extremities: No redness or tenderness, neg Benjamin's sign  Psych: Normal mood and behavior.      Dawood Ma MD     Answers submitted by the patient for this visit:  Gynecologic Exam Questionnaire  (Submitted on 2025)  Chief Complaint: Gynecologic exam  genital itching: No  genital lesions: No  genital odor: No  genital rash: No  missed menses: No  pelvic pain: No  vaginal bleeding: No  vaginal discharge: No  Pain severity: no pain  Pregnant now?: No  abdominal pain: No  anorexia: No  back pain: No  chills: No  constipation: Yes  diarrhea: No  discolored urine: No  dysuria: No  fever: No  flank pain: No  frequency: No  headaches: No  hematuria: No  nausea: No  painful intercourse: No  rash: No  urgency: No  vomiting: No  Vaginal bleeding: no bleeding  Passing clots?: No  Passing tissue?: No  Sexual activity: not sexually active  Partner with STD symptoms: no  STD: No  abdominal surgery: No   section: Yes  Ectopic pregnancy: No  Endometriosis: No

## 2025-02-27 NOTE — LETTER
February 27, 2025      [facility]  [address] [place] [id number]  Phone: [phone]  Fax: [phone]       Patient: Haleigh Newman   YOB: 1980  Date of Visit: 02/27/2025    To Whom It May Concern:    Ozzy Newman  was at [facility] on 02/27/2025. The patient has been under our care and may return to work/school on 03/03/2025 with no restrictions. If you have any questions or concerns, or if I can be of further assistance, please do not hesitate to contact me.    Sincerely,    Mansi Husain LPN

## 2025-02-28 ENCOUNTER — RESULTS FOLLOW-UP (OUTPATIENT)
Dept: OBSTETRICS AND GYNECOLOGY | Facility: CLINIC | Age: 45
End: 2025-02-28
Payer: COMMERCIAL

## 2025-02-28 DIAGNOSIS — N76.0 BACTERIAL VAGINOSIS: Primary | ICD-10-CM

## 2025-02-28 DIAGNOSIS — B96.89 BACTERIAL VAGINOSIS: Primary | ICD-10-CM

## 2025-02-28 DIAGNOSIS — B37.9 CANDIDA GLABRATA INFECTION: ICD-10-CM

## 2025-02-28 RX ORDER — TINIDAZOLE 500 MG/1
1 TABLET ORAL 2 TIMES DAILY
Qty: 8 TABLET | Refills: 0 | Status: SHIPPED | OUTPATIENT
Start: 2025-02-28 | End: 2025-03-02

## 2025-03-03 ENCOUNTER — PATIENT MESSAGE (OUTPATIENT)
Dept: OBSTETRICS AND GYNECOLOGY | Facility: CLINIC | Age: 45
End: 2025-03-03
Payer: COMMERCIAL

## 2025-03-06 ENCOUNTER — TELEPHONE (OUTPATIENT)
Dept: GASTROENTEROLOGY | Facility: HOSPITAL | Age: 45
End: 2025-03-06
Payer: COMMERCIAL

## 2025-03-06 NOTE — TELEPHONE ENCOUNTER
Call returned to pt - pt requested appt change from 7/14/25 to 7/3/25 - appt changed as requested - new appt date/time of 7/3/25 at 1130 given to pt - new instruction sheet mailed

## 2025-03-06 NOTE — TELEPHONE ENCOUNTER
----- Message from Daniella sent at 3/6/2025  8:48 AM CST -----  Regarding: Reschedule colonoscopy for July 3rd instead; pt states that date is better  Who Called: Haleigh Fitzgerald is requesting a sooner appointment. Preferred Method of Contact: Phone CallPatient's Preferred Phone Number on File: [phone] Best Call Back Number, if different:Additional Information: Reschedule colonoscopy for July 3rd instead; pt states that date is better

## 2025-03-26 ENCOUNTER — HOSPITAL ENCOUNTER (EMERGENCY)
Facility: HOSPITAL | Age: 45
Discharge: HOME | End: 2025-03-26
Attending: EMERGENCY MEDICINE
Payer: COMMERCIAL

## 2025-03-26 VITALS
TEMPERATURE: 98 F | WEIGHT: 208.5 LBS | DIASTOLIC BLOOD PRESSURE: 90 MMHG | SYSTOLIC BLOOD PRESSURE: 159 MMHG | HEIGHT: 64 IN | BODY MASS INDEX: 35.59 KG/M2 | OXYGEN SATURATION: 100 % | RESPIRATION RATE: 19 BRPM | HEART RATE: 78 BPM

## 2025-03-26 DIAGNOSIS — R07.9 CHEST PAIN: ICD-10-CM

## 2025-03-26 DIAGNOSIS — R00.2 PALPITATIONS: Primary | ICD-10-CM

## 2025-03-26 DIAGNOSIS — F41.9 ANXIETY: ICD-10-CM

## 2025-03-26 LAB
ALBUMIN SERPL BCP-MCNC: 3.9 G/DL (ref 3.5–5)
ALBUMIN/GLOB SERPL: 1.2 {RATIO}
ALP SERPL-CCNC: 81 U/L (ref 40–150)
ALT SERPL W P-5'-P-CCNC: 10 U/L
ANION GAP SERPL CALCULATED.3IONS-SCNC: 12 MMOL/L (ref 7–16)
APTT PPP: 24.2 SECONDS (ref 25.2–37.3)
AST SERPL W P-5'-P-CCNC: 19 U/L (ref 11–45)
BASOPHILS # BLD AUTO: 0.04 K/UL (ref 0–0.2)
BASOPHILS NFR BLD AUTO: 0.5 % (ref 0–1)
BILIRUB SERPL-MCNC: 0.3 MG/DL
BUN SERPL-MCNC: 8 MG/DL (ref 7–19)
BUN/CREAT SERPL: 10 (ref 6–20)
CALCIUM SERPL-MCNC: 8.9 MG/DL (ref 8.4–10.2)
CHLORIDE SERPL-SCNC: 108 MMOL/L (ref 98–107)
CO2 SERPL-SCNC: 22 MMOL/L (ref 22–29)
CREAT SERPL-MCNC: 0.8 MG/DL (ref 0.55–1.02)
DIFFERENTIAL METHOD BLD: ABNORMAL
EGFR (NO RACE VARIABLE) (RUSH/TITUS): 93 ML/MIN/1.73M2
EOSINOPHIL # BLD AUTO: 0.14 K/UL (ref 0–0.5)
EOSINOPHIL NFR BLD AUTO: 1.6 % (ref 1–4)
ERYTHROCYTE [DISTWIDTH] IN BLOOD BY AUTOMATED COUNT: 13.7 % (ref 11.5–14.5)
GLOBULIN SER-MCNC: 3.2 G/DL (ref 2–4)
GLUCOSE SERPL-MCNC: 87 MG/DL (ref 74–100)
HCT VFR BLD AUTO: 34.7 % (ref 38–47)
HGB BLD-MCNC: 10.8 G/DL (ref 12–16)
IMM GRANULOCYTES # BLD AUTO: 0.04 K/UL (ref 0–0.04)
IMM GRANULOCYTES NFR BLD: 0.5 % (ref 0–0.4)
INR BLD: 1
LACTATE SERPL-SCNC: 0.8 MMOL/L (ref 0.5–2.2)
LYMPHOCYTES # BLD AUTO: 2.22 K/UL (ref 1–4.8)
LYMPHOCYTES NFR BLD AUTO: 25.8 % (ref 27–41)
MAGNESIUM SERPL-MCNC: 2 MG/DL (ref 1.6–2.6)
MCH RBC QN AUTO: 27.8 PG (ref 27–31)
MCHC RBC AUTO-ENTMCNC: 31.1 G/DL (ref 32–36)
MCV RBC AUTO: 89.4 FL (ref 80–96)
MONOCYTES # BLD AUTO: 0.49 K/UL (ref 0–0.8)
MONOCYTES NFR BLD AUTO: 5.7 % (ref 2–6)
MPC BLD CALC-MCNC: 11.8 FL (ref 9.4–12.4)
NEUTROPHILS # BLD AUTO: 5.69 K/UL (ref 1.8–7.7)
NEUTROPHILS NFR BLD AUTO: 65.9 % (ref 53–65)
NRBC # BLD AUTO: 0 X10E3/UL
NRBC, AUTO (.00): 0 %
NT-PROBNP SERPL-MCNC: 102 PG/ML (ref 1–125)
PLATELET # BLD AUTO: 260 K/UL (ref 150–400)
POTASSIUM SERPL-SCNC: 3.3 MMOL/L (ref 3.5–5.1)
PROT SERPL-MCNC: 7.1 G/DL (ref 6.4–8.3)
PROTHROMBIN TIME: 13.1 SECONDS (ref 11.7–14.7)
RBC # BLD AUTO: 3.88 M/UL (ref 4.2–5.4)
SODIUM SERPL-SCNC: 139 MMOL/L (ref 136–145)
TROPONIN I SERPL HS-MCNC: 9.6 NG/L
WBC # BLD AUTO: 8.62 K/UL (ref 4.5–11)

## 2025-03-26 PROCEDURE — 85025 COMPLETE CBC W/AUTO DIFF WBC: CPT | Performed by: EMERGENCY MEDICINE

## 2025-03-26 PROCEDURE — 99285 EMERGENCY DEPT VISIT HI MDM: CPT | Mod: 25

## 2025-03-26 PROCEDURE — 83605 ASSAY OF LACTIC ACID: CPT | Performed by: EMERGENCY MEDICINE

## 2025-03-26 PROCEDURE — 84484 ASSAY OF TROPONIN QUANT: CPT | Performed by: EMERGENCY MEDICINE

## 2025-03-26 PROCEDURE — 93010 ELECTROCARDIOGRAM REPORT: CPT | Mod: ,,, | Performed by: INTERNAL MEDICINE

## 2025-03-26 PROCEDURE — 85610 PROTHROMBIN TIME: CPT | Performed by: EMERGENCY MEDICINE

## 2025-03-26 PROCEDURE — 85730 THROMBOPLASTIN TIME PARTIAL: CPT | Performed by: EMERGENCY MEDICINE

## 2025-03-26 PROCEDURE — 83880 ASSAY OF NATRIURETIC PEPTIDE: CPT | Performed by: EMERGENCY MEDICINE

## 2025-03-26 PROCEDURE — 80053 COMPREHEN METABOLIC PANEL: CPT | Performed by: EMERGENCY MEDICINE

## 2025-03-26 PROCEDURE — 83735 ASSAY OF MAGNESIUM: CPT | Performed by: EMERGENCY MEDICINE

## 2025-03-26 PROCEDURE — 93005 ELECTROCARDIOGRAM TRACING: CPT

## 2025-03-26 RX ORDER — ESCITALOPRAM OXALATE 10 MG/1
10 TABLET ORAL DAILY
Status: DISCONTINUED | OUTPATIENT
Start: 2025-03-27 | End: 2025-03-26

## 2025-03-26 RX ORDER — TRANEXAMIC ACID 650 MG/1
1300 TABLET ORAL 3 TIMES DAILY
COMMUNITY
Start: 2025-03-06

## 2025-03-26 RX ORDER — ESCITALOPRAM OXALATE 10 MG/1
10 TABLET ORAL DAILY
Qty: 90 TABLET | Refills: 0 | Status: SHIPPED | OUTPATIENT
Start: 2025-03-26 | End: 2025-06-24

## 2025-03-26 RX ORDER — LOSARTAN POTASSIUM 100 MG/1
100 TABLET ORAL DAILY
Qty: 90 TABLET | Refills: 3 | Status: SHIPPED | OUTPATIENT
Start: 2025-03-26 | End: 2026-03-26

## 2025-03-26 RX ORDER — ESCITALOPRAM OXALATE 10 MG/1
10 TABLET ORAL ONCE
Status: DISCONTINUED | OUTPATIENT
Start: 2025-03-26 | End: 2025-03-26

## 2025-03-26 NOTE — Clinical Note
"Haleigh"Reji Newman was seen and treated in our emergency department on 3/26/2025.  She may return to work on 03/28/2025.       If you have any questions or concerns, please don't hesitate to call.      Hima Ramos MD"

## 2025-03-26 NOTE — ED PROVIDER NOTES
Encounter Date: 3/26/2025    SCRIBE #1 NOTE: I, Gardeniabar Mensah, am scribing for, and in the presence of,  Hima Ramos MS.       History     Chief Complaint   Patient presents with    Palpitations     PRESENTS TO ER WITH COMPLAINT OF PALPITATIONS.      Patient is a 44 year old female presenting to the ED with complaints of chest pain and palpitations for 1 day. She reports feeling like her heart is racing and fluttering. Pain is worsened by laying flat with mild shortness of breath. Patient also reports sharp RUQ abdominal pain for 2 days without any other symptoms at this time.         Review of patient's allergies indicates:  No Known Allergies  Past Medical History:   Diagnosis Date    Anxiety with depression     Carboxyhemoglobinemia     says she smells it when she drives her car    Essential (primary) hypertension     Migraine without status migrainosus, not intractable 2024    Nicotine dependence     SUDHEER on CPAP      Past Surgical History:   Procedure Laterality Date    BILATERAL TUBAL LIGATION      BLADDER REPAIR  2025    Dr. Perrin    BLADDER REPAIR  2025    Procedure: REPAIR, BLADDER.OPEN;  Surgeon: Gwyn Perrin MD;  Location: [facility] OR;  Service: Urology;;     SECTION      CYSTOSCOPY W/ RETROGRADES Bilateral 2025    Procedure: CYSTOSCOPY, WITH RETROGRADE PYELOGRAM;  Surgeon: Gwyn Perrin MD;  Location: [facility] OR;  Service: Urology;  Laterality: Bilateral;    LAPAROTOMY, EXPLORATORY  2025    Procedure: LAPAROTOMY, EXPLORATORY;  Surgeon: Gwyn Perrin MD;  Location: [facility] OR;  Service: Urology;;    LYSIS OF ADHESIONS  2025    Dr. Taylor    ROBOT-ASSISTED LAPAROSCOPIC LYSIS OF ADHESIONS USING DA VIANCA XI  2025    Procedure: XI ROBOTIC LYSIS, ADHESIONS;  Surgeon: Dawood Ma MD;  Location: [facility] OR;  Service: OB/GYN;;    ROBOTIC HYSTERECTOMY, WITH SALPINGO-OOPHORECTOMY Bilateral 2025    Dr. Dawood COVARRUBIAS ROBOTIC HYSTERECTOMY, WITH  SALPINGO-OOPHORECTOMY Bilateral 1/8/2025    Procedure: XI ROBOTIC HYSTERECTOMY,WITH BILATERAL SALPINGO-OOPHORECTOMY,RIGHT OVARY;  Surgeon: Dawood Ma MD;  Location: [facility];  Service: OB/GYN;  Laterality: Bilateral;     Family History   Problem Relation Name Age of Onset    Hypertension Mother      Thyroid disease Mother      Hypertension Father      Thyroid disease Sister      Cancer Son      Thyroid disease Maternal Aunt      Cancer Paternal Aunt      Hypertension Maternal Grandmother      Cancer Maternal Grandmother      Diabetes Maternal Grandmother      Hypertension Maternal Grandfather      Diabetes Maternal Grandfather      Hypertension Paternal Grandmother      Cancer Paternal Grandmother      Diabetes Paternal Grandmother      Hypertension Paternal Grandfather      Diabetes Paternal Grandfather       Social History[1]  Review of Systems   Constitutional:  Negative for chills, fatigue and fever.   Respiratory:  Positive for shortness of breath. Negative for cough and chest tightness.    Cardiovascular:  Positive for chest pain and palpitations. Negative for leg swelling.   Gastrointestinal:  Positive for abdominal distention and abdominal pain. Negative for constipation, diarrhea, nausea and vomiting.       Physical Exam     Initial Vitals   BP Pulse Resp Temp SpO2   03/26/25 1717 03/26/25 1717 03/26/25 1717 03/26/25 1717 03/26/25 1717   (!) 157/96 79 20 98.3 °F (36.8 °C) 99 %      MAP       --                Physical Exam    Nursing note and vitals reviewed.  HENT:   Head: Normocephalic and atraumatic.   Eyes: EOM are normal.   Cardiovascular:  Normal rate, regular rhythm and normal heart sounds.           Pulmonary/Chest: Breath sounds normal. No respiratory distress. She has no wheezes. She exhibits no tenderness.   Abdominal: Abdomen is soft. Bowel sounds are normal. She exhibits no distension. There is no abdominal tenderness.   Musculoskeletal:         General: Normal range of motion.      Neurological: She is alert and oriented to person, place, and time.   Skin: Skin is warm and dry.   Psychiatric: She has a normal mood and affect.         ED Course   Procedures  Labs Reviewed   COMPREHENSIVE METABOLIC PANEL - Abnormal       Result Value    Sodium 139      Potassium 3.3 (*)     Chloride 108 (*)     CO2 22      Anion Gap 12      Glucose 87      BUN 8      Creatinine 0.80      BUN/Creatinine Ratio 10      Calcium 8.9      Total Protein 7.1      Albumin 3.9      Globulin 3.2      A/G Ratio 1.2      Bilirubin, Total 0.3      Alk Phos 81      ALT 10      AST 19      eGFR 93     APTT - Abnormal    PTT 24.2 (*)    CBC WITH DIFFERENTIAL - Abnormal    WBC 8.62      RBC 3.88 (*)     Hemoglobin 10.8 (*)     Hematocrit 34.7 (*)     MCV 89.4      MCH 27.8      MCHC 31.1 (*)     RDW 13.7      Platelet Count 260      MPV 11.8      Neutrophils % 65.9 (*)     Lymphocytes % 25.8 (*)     Monocytes % 5.7      Eosinophils % 1.6      Basophils % 0.5      Immature Granulocytes % 0.5 (*)     nRBC, Auto 0.0      Neutrophils, Abs 5.69      Lymphocytes, Absolute 2.22      Monocytes, Absolute 0.49      Eosinophils, Absolute 0.14      Basophils, Absolute 0.04      Immature Granulocytes, Absolute 0.04      nRBC, Absolute 0.00      Diff Type Auto     NT-PRO NATRIURETIC PEPTIDE - Normal    ProBNP 102     MAGNESIUM - Normal    Magnesium 2.0     PROTIME-INR - Normal    PT 13.1      INR 1.00     TROPONIN I - Normal    Troponin I High Sensitivity 9.6     LACTIC ACID, PLASMA - Normal    Lactic Acid 0.8     CBC W/ AUTO DIFFERENTIAL    Narrative:     The following orders were created for panel order CBC auto differential.  Procedure                               Abnormality         Status                     ---------                               -----------         ------                     CBC with Differential[[id number]]       Abnormal            Final result                 Please view results for these tests on the individual  orders.          Imaging Results              X-Ray Chest AP Portable (Final result)  Result time 03/26/25 18:32:27      Final result by Brad Deng MD (03/26/25 18:32:27)                   Impression:      1. Interstitial findings are accentuated by habitus, no large focal consolidation.      Electronically signed by: Brad Deng MD  Date:    03/26/2025  Time:    18:32               Narrative:    EXAMINATION:  XR CHEST AP PORTABLE    CLINICAL HISTORY:  Chest pain, unspecified    TECHNIQUE:  Single frontal view of the chest was performed.    COMPARISON:  None    FINDINGS:  The cardiomediastinal silhouette is not enlarged.  There is no pleural effusion.  The trachea is midline.  The lungs are symmetrically expanded bilaterally with coarse interstitial attenuation.  No large focal consolidation seen.  There is no pneumothorax.  The osseous structures are unremarkable.                                       Medications - No data to display  Medical Decision Making  Amount and/or Complexity of Data Reviewed  Labs: ordered.  Radiology: ordered.    Risk  Prescription drug management.                          Medical Decision Making:   Initial Assessment:   Patient is a 44 year old female presenting to the ED with complaints of chest pain and palpitations for 1 day. She reports feeling like her heart is racing and fluttering. Pain is worsened by laying flat with mild shortness of breath. Patient also reports sharp RUQ abdominal pain for 2 days without any other symptoms at this time.         Differential Diagnosis:   Anxiety with chest elevated blood             Clinical Impression:  Final diagnoses:  [R07.9] Chest pain  [R00.2] Palpitations (Primary)  [F41.9] Anxiety          ED Disposition Condition    Discharge           ED Prescriptions       Medication Sig Dispense Start Date End Date Auth. Provider    EScitalopram oxalate (LEXAPRO) 10 MG tablet Take 1 tablet (10 mg total) by mouth once daily. for 90 doses  90 tablet 3/26/2025 2025 David Rubalcava DO    losartan (COZAAR) 100 MG tablet Take 1 tablet (100 mg total) by mouth once daily. 90 tablet 3/26/2025 3/26/2026 David Rubalcava DO          Follow-up Information    None              [1]   Social History  Tobacco Use    Smoking status: Former     Current packs/day: 0.00     Types: Cigarettes     Quit date: 10/2007     Years since quittin.4    Smokeless tobacco: Never    Tobacco comments:     Has taken chantix in the past.    Substance Use Topics    Alcohol use: Never    Drug use: Never        David Rubalcava DO  25 0734

## 2025-03-27 ENCOUNTER — TELEPHONE (OUTPATIENT)
Dept: EMERGENCY MEDICINE | Facility: HOSPITAL | Age: 45
End: 2025-03-27
Payer: COMMERCIAL

## 2025-03-27 NOTE — ED NOTES
Pt reconnected to monitoring equipment at this time after going to the bathroom.  Denies complaints at this time. Nurse instructed pt that we are awaiting pharmacy to approve the lexapro so nurse can administer. Pt voiced understanding.

## 2025-03-27 NOTE — ED NOTES
Rec'vd report from LYDIA Velazquez.  Nurse to bedside and spoke with patient regarding her pain.  She states she is not having any chest pain but feels like her heart is beating fast. Nurse instructed pt that her pulse at this time and is 76 and regular.  However upon further talking patient states that she has been having some anxiety and that she has been out of her lexepro for 4 days and asked if this could cause her to feel like her heart is racing.  Nurse explained to pt that anxiety can cause you to have spells where you feel like or where your heart really is beating faster.  Nurse instructed pt that she would discuss with MD regarding the medication and she also states that she does not currently have a PCP and request information regarding [facility] providers.  Nurse instructed pt that she would give her a list of the [facility] clinics that she can choose from.  Pt denies any other complaints.  Call light left in reach.

## 2025-03-27 NOTE — ED NOTES
Spoke with MD and instructed on pt's request for recommendations on PCP and also that she had been out of the Lexapro for 4 days.  MD states will restart the Lexapro and give her a dose her.  Nurse gave pt a list of [facility] and told her MD was looking at the medications and about the Lexapro.  Pt voiced understanding.

## 2025-03-29 LAB
OHS QRS DURATION: 78 MS
OHS QTC CALCULATION: 419 MS

## 2025-04-07 ENCOUNTER — PATIENT MESSAGE (OUTPATIENT)
Dept: OBSTETRICS AND GYNECOLOGY | Facility: CLINIC | Age: 45
End: 2025-04-07
Payer: COMMERCIAL

## 2025-04-10 ENCOUNTER — OFFICE VISIT (OUTPATIENT)
Dept: OBSTETRICS AND GYNECOLOGY | Facility: CLINIC | Age: 45
End: 2025-04-10
Payer: COMMERCIAL

## 2025-04-10 DIAGNOSIS — N95.1 PERIMENOPAUSAL VASOMOTOR SYMPTOMS: Primary | ICD-10-CM

## 2025-04-10 DIAGNOSIS — R68.82 DECREASED LIBIDO: ICD-10-CM

## 2025-04-10 DIAGNOSIS — R53.83 FATIGUE, UNSPECIFIED TYPE: ICD-10-CM

## 2025-04-10 RX ORDER — ESTRADIOL 0.07 MG/D
1 FILM, EXTENDED RELEASE TRANSDERMAL
Qty: 8 PATCH | Refills: 11 | Status: SHIPPED | OUTPATIENT
Start: 2025-04-10 | End: 2026-04-10

## 2025-04-10 NOTE — PROGRESS NOTES
Audio Only Telehealth Visit     The patient location is: home  The chief complaint leading to consultation is: hot flashes, night sweats  Visit type: Virtual visit with audio only (telephone)  Total time spent in medical discussion with patient: 5 minutes  Total time spent on date of the encounter:10 minutes       The reason for the audio only service rather than synchronous audio and video virtual visit was related to technical difficulties or patient preference/necessity.       Each patient to whom I provide medical services by telemedicine is:  (1) informed of the relationship between the physician and patient and the respective role of any other health care provider with respect to management of the patient; and (2) notified that they may decline to receive medical services by telemedicine and may withdraw from such care at any time. Patient verbally consented to receive this service via voice-only telephone call.       HPI: 43 yo  s/p TRH BS with BS and RO with incidental cystotomy and repair by urology about 3 months ago is c/o worsening hot flashes, night sweats. She is experiencing this all throughout the day. She is also having low libido and no urge for sex. This is affecting her as well.    She is taking a women's one a day vitamin and Biotin supplement. She is wondering about her other vitamin levels as well.      Assessment and plan:    Perimenopausal vasomotor symptoms.   Decreased lbido.    Discussed vasomotor symptoms of menopause are due to lack of estrogen. Therefore, when we replace estrogen, it resolves symptoms. The safest method if a patch that is twice weekly due to avoiding first pass metabolism in the liver. Due to her not having an intact uterus, progesterone is not required due to no endometrial protection needed. Discussed some patients may benefit from progesterone who are suffering from insomnia related to menopause. Discussed possible side effects of spotting. However, if  spotting occurs, then RTC for evaluation. Discussed increased risk of clotting such as DVT, PTE. She verbalized her understanding and desires to proceed.     Virtual visit in 4 weeks to f/u on symptoms.      Start Estradiol 0.075 twice weekly patch.  Will order blood work to start testosterone therapy after f/u visit and include Vit D and Vit B12.          This service was not originating from a related E/M service provided within the previous 7 days nor will  to an E/M service or procedure within the next 24 hours or my soonest available appointment.  Prevailing standard of care was able to be met in this audio-only visit.

## 2025-04-24 ENCOUNTER — OFFICE VISIT (OUTPATIENT)
Dept: FAMILY MEDICINE | Facility: CLINIC | Age: 45
End: 2025-04-24
Payer: COMMERCIAL

## 2025-04-24 VITALS
TEMPERATURE: 98 F | SYSTOLIC BLOOD PRESSURE: 138 MMHG | HEART RATE: 71 BPM | BODY MASS INDEX: 36.02 KG/M2 | WEIGHT: 211 LBS | HEIGHT: 64 IN | DIASTOLIC BLOOD PRESSURE: 89 MMHG | OXYGEN SATURATION: 98 % | RESPIRATION RATE: 18 BRPM

## 2025-04-24 DIAGNOSIS — G47.33 OSA ON CPAP: ICD-10-CM

## 2025-04-24 DIAGNOSIS — I10 ESSENTIAL (PRIMARY) HYPERTENSION: ICD-10-CM

## 2025-04-24 DIAGNOSIS — M25.561 RIGHT KNEE PAIN, UNSPECIFIED CHRONICITY: Primary | ICD-10-CM

## 2025-04-24 PROCEDURE — 99213 OFFICE O/P EST LOW 20 MIN: CPT | Mod: GC,,, | Performed by: FAMILY MEDICINE

## 2025-04-24 PROCEDURE — 3079F DIAST BP 80-89 MM HG: CPT | Mod: ,,, | Performed by: FAMILY MEDICINE

## 2025-04-24 PROCEDURE — 4010F ACE/ARB THERAPY RXD/TAKEN: CPT | Mod: ,,, | Performed by: FAMILY MEDICINE

## 2025-04-24 PROCEDURE — 3075F SYST BP GE 130 - 139MM HG: CPT | Mod: ,,, | Performed by: FAMILY MEDICINE

## 2025-04-24 PROCEDURE — 3008F BODY MASS INDEX DOCD: CPT | Mod: ,,, | Performed by: FAMILY MEDICINE

## 2025-04-24 RX ORDER — BETAMETHASONE DIPROPIONATE 0.5 MG/G
LOTION TOPICAL
COMMUNITY
Start: 2025-04-08

## 2025-04-24 RX ORDER — AMLODIPINE BESYLATE 5 MG/1
5 TABLET ORAL DAILY
Qty: 90 TABLET | Refills: 0 | Status: SHIPPED | OUTPATIENT
Start: 2025-04-24 | End: 2025-07-23
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
## 2025-05-08 ENCOUNTER — OFFICE VISIT (OUTPATIENT)
Dept: OBSTETRICS AND GYNECOLOGY | Facility: CLINIC | Age: 45
End: 2025-05-08
Payer: COMMERCIAL

## 2025-05-08 DIAGNOSIS — R68.82 DECREASED LIBIDO: ICD-10-CM

## 2025-05-08 DIAGNOSIS — N95.1 PERIMENOPAUSAL VASOMOTOR SYMPTOMS: Primary | ICD-10-CM

## 2025-05-08 PROCEDURE — 4010F ACE/ARB THERAPY RXD/TAKEN: CPT | Mod: 93,,, | Performed by: OBSTETRICS & GYNECOLOGY

## 2025-05-08 PROCEDURE — 98013 SYNCH AUDIO-ONLY EST LOW 20: CPT | Mod: 93,,, | Performed by: OBSTETRICS & GYNECOLOGY

## 2025-05-08 PROCEDURE — 1160F RVW MEDS BY RX/DR IN RCRD: CPT | Mod: 93,,, | Performed by: OBSTETRICS & GYNECOLOGY

## 2025-05-08 PROCEDURE — 1159F MED LIST DOCD IN RCRD: CPT | Mod: 93,,, | Performed by: OBSTETRICS & GYNECOLOGY

## 2025-05-08 RX ORDER — TESTOSTERONE 50 MG/5G
0.5 GEL TRANSDERMAL DAILY
Qty: 15 G | Refills: 5 | Status: SHIPPED | OUTPATIENT
Start: 2025-05-08 | End: 2025-11-06

## 2025-05-08 NOTE — ASSESSMENT & PLAN NOTE
Pt instructed to adhere to her CPAP as untreated SUDHEER can cause or exacerbate several medical ailments.  Pt verbally acknowledged understanding.

## 2025-05-08 NOTE — ASSESSMENT & PLAN NOTE
Pt has several blood pressure meds on list but gives mixed history about which ones she is actually taking.   Pt has likely been taking metoprolol, losartan and then ran out of amlodipine so was therefore taking the hydralazine (which was intended as PRN systolic >160).  Will keep on losartan 100 mg and metoprolol 50 mg. Will put back on amlodipine 5 mg.  If pt can adhere to CPAP, will likely be able to wean off some of these BP meds.  Pt to follow up in 8 weeks.

## 2025-05-08 NOTE — PROGRESS NOTES
Audio Only Telehealth Visit     The patient location is: home  The chief complaint leading to consultation is: follow up on HRT and decreased libido.  Visit type: Virtual visit with audio only (telephone)  Total time spent in medical discussion with patient: 5 minutes  Total time spent on date of the encounter:10 minutes       The reason for the audio only service rather than synchronous audio and video virtual visit was related to technical difficulties or patient preference/necessity.       Each patient to whom I provide medical services by telemedicine is:  (1) informed of the relationship between the physician and patient and the respective role of any other health care provider with respect to management of the patient; and (2) notified that they may decline to receive medical services by telemedicine and may withdraw from such care at any time. Patient verbally consented to receive this service via voice-only telephone call.       HPI: 45 yo  s/p TRH BS and perimenopausal vasomotor symptoms and decreased libido has recently started HRT. She is now on the Estradiol patch 0.075 twice a week and this is working well for her. She states that her hot flashes have resolved.   However, she still continues to have decreased libido that is putting a strain on her relationship.   Discussed testosterone topical last visit. She has already completed her labs. Her CMP and lipid panel were normal. Her testosterone was normal. SHBG wnl. Therefore, she is ready to start this.     Assessment and plan:    Perimenopausal vasomotor symptoms.  Decreased libido.    Continue current patch dose.   Start topical testosterone. Correct use discussed.  Repeat testosterone labs in 4 weeks. Then virtual visit in 6 weeks.   If labs wnl, then repeat labs again in 4-6 months.                    This service was not originating from a related E/M service provided within the previous 7 days nor will  to an E/M service or procedure  within the next 24 hours or my soonest available appointment.  Prevailing standard of care was able to be met in this audio-only visit.

## 2025-05-13 ENCOUNTER — PATIENT MESSAGE (OUTPATIENT)
Dept: OBSTETRICS AND GYNECOLOGY | Facility: CLINIC | Age: 45
End: 2025-05-13
Payer: COMMERCIAL

## 2025-06-10 ENCOUNTER — TELEPHONE (OUTPATIENT)
Dept: GASTROENTEROLOGY | Facility: CLINIC | Age: 45
End: 2025-06-10
Payer: COMMERCIAL

## 2025-06-10 NOTE — TELEPHONE ENCOUNTER
Call to pt regarding appt scheduled for 7/3/25 - pt stated she wpuld need to cancel r/t she would be out of town - appt rescheduled for 10/6/25 per pt request and new instruction sheet mailed to pt

## 2025-06-17 ENCOUNTER — OFFICE VISIT (OUTPATIENT)
Dept: OBSTETRICS AND GYNECOLOGY | Facility: CLINIC | Age: 45
End: 2025-06-17
Payer: COMMERCIAL

## 2025-06-17 DIAGNOSIS — N95.1 PERIMENOPAUSAL VASOMOTOR SYMPTOMS: Primary | ICD-10-CM

## 2025-06-17 DIAGNOSIS — R68.82 DECREASED LIBIDO: ICD-10-CM

## 2025-06-17 PROCEDURE — 4010F ACE/ARB THERAPY RXD/TAKEN: CPT | Mod: 93,,, | Performed by: OBSTETRICS & GYNECOLOGY

## 2025-06-17 PROCEDURE — 1159F MED LIST DOCD IN RCRD: CPT | Mod: 93,,, | Performed by: OBSTETRICS & GYNECOLOGY

## 2025-06-17 PROCEDURE — 1160F RVW MEDS BY RX/DR IN RCRD: CPT | Mod: 93,,, | Performed by: OBSTETRICS & GYNECOLOGY

## 2025-06-17 PROCEDURE — 98012 SYNCH AUDIO-ONLY EST SF 10: CPT | Mod: 93,,, | Performed by: OBSTETRICS & GYNECOLOGY

## 2025-06-17 RX ORDER — TESTOSTERONE 50 MG/5G
0.5 GEL TRANSDERMAL DAILY
Qty: 15 G | Refills: 5 | Status: SHIPPED | OUTPATIENT
Start: 2025-06-17 | End: 2025-12-16

## 2025-06-17 NOTE — PROGRESS NOTES
Audio Only Telehealth Visit     The patient location is: home  The chief complaint leading to consultation is: f/u on HRT and testosterone therapy for decreased libido.  Visit type: Virtual visit with audio only (telephone)  Total time spent in medical discussion with patient: 5 minutes  Total time spent on date of the encounter:10 minutes       The reason for the audio only service rather than synchronous audio and video virtual visit was related to technical difficulties or patient preference/necessity.       Each patient to whom I provide medical services by telemedicine is:  (1) informed of the relationship between the physician and patient and the respective role of any other health care provider with respect to management of the patient; and (2) notified that they may decline to receive medical services by telemedicine and may withdraw from such care at any time. Patient verbally consented to receive this service via voice-only telephone call.       HPI: She was started on Testosterone therapy 6 weeks ago for decreased libido. However, she was not able to start it due to pharmacy waiting on PA. We tried to contact the patient via portal to see if she could get the Rx filled here at [facility] to get a better price, but she did not respond on the portal. Therefore, she has not started it. She did not do the testosterone lab because she did not start it.     Assessment and plan:      Perimenopausal vasomotor symptoms.  Decreased libido.    Testosterone gel sent to [facility]. They are able to fill the packets and the cash pay price is better. This was explained to the patient.     In 4 weeks, complete testosterone lab.  In 6 weeks, f/u for virtual visit.  Call sooner if any other issues or concerns arise.     This service was not originating from a related E/M service provided within the previous 7 days nor will  to an E/M service or procedure within the next 24 hours or my soonest available  appointment.  Prevailing standard of care was able to be met in this audio-only visit.

## 2025-06-17 NOTE — PATIENT INSTRUCTIONS
Do Testosterone lab in 4 weeks after starting the medication.   Then f/u virtual visit in 6 weeks.

## 2025-07-23 RX ORDER — ESCITALOPRAM OXALATE 10 MG/1
10 TABLET ORAL
Qty: 90 TABLET | Refills: 0 | Status: SHIPPED | OUTPATIENT
Start: 2025-07-23

## 2025-07-29 ENCOUNTER — OFFICE VISIT (OUTPATIENT)
Dept: OBSTETRICS AND GYNECOLOGY | Facility: CLINIC | Age: 45
End: 2025-07-29
Payer: COMMERCIAL

## 2025-07-29 ENCOUNTER — TELEPHONE (OUTPATIENT)
Dept: OBSTETRICS AND GYNECOLOGY | Facility: CLINIC | Age: 45
End: 2025-07-29
Payer: COMMERCIAL

## 2025-07-29 DIAGNOSIS — R68.82 DECREASED LIBIDO: ICD-10-CM

## 2025-07-29 DIAGNOSIS — N95.1 PERIMENOPAUSAL VASOMOTOR SYMPTOMS: Primary | ICD-10-CM

## 2025-07-29 PROCEDURE — 99499 UNLISTED E&M SERVICE: CPT | Mod: 95,,, | Performed by: OBSTETRICS & GYNECOLOGY

## 2025-07-29 NOTE — PROGRESS NOTES
Audio Only Telehealth Visit     Attempted to call, but call did not go through.   Will try to reach the patient by another route.                        This service was not originating from a related E/M service provided within the previous 7 days nor will  to an E/M service or procedure within the next 24 hours or my soonest available appointment.  Prevailing standard of care was able to be met in this audio-only visit.

## 2025-07-29 NOTE — TELEPHONE ENCOUNTER
----- Message from Dawood Ma MD sent at 7/29/2025  9:18 AM CDT -----  Regarding: Number not working  Can one of you try to contact the patient to r/s virtual appointment.   I called her number and it said it could not go through.    Also she needs to do her testosterone lab prior to her virtual appointment so we can discuss the dose and determine if it needs to be adjusted.     Thanks.

## (undated) DEVICE — SEALER VESSEL EXTEND

## (undated) DEVICE — MANIPULATOR UTERINE 3CM

## (undated) DEVICE — ELECTRODE BLADE INSULATED 1 IN

## (undated) DEVICE — RESERVOIR JACKSON-PRATT 100CC

## (undated) DEVICE — SPONGE LAP 18X18 PREWASHED

## (undated) DEVICE — SUT VICRYL PLUS 1 CTX 36IN

## (undated) DEVICE — GOWN POLY REINF BRTH SLV XL

## (undated) DEVICE — SUT VICRYL PLUS CT-1 1 8-27IN

## (undated) DEVICE — SCISSOR HOT SHEARS XI

## (undated) DEVICE — SUT MONOCRYL 3-0 PS-2 UND

## (undated) DEVICE — PAD CURAD NONADH 3X4IN

## (undated) DEVICE — GLOVE SENSICARE PI GRN 6.5

## (undated) DEVICE — CATH URTRL OPEN END STR TIP 5F

## (undated) DEVICE — SUT PRLENE 1CT 1 BL MONO 30

## (undated) DEVICE — GLOVE SENSICARE PI SURG 7

## (undated) DEVICE — SYR 50ML CATH TIP

## (undated) DEVICE — DRAPE ARM DAVINCI XI

## (undated) DEVICE — DRAIN BLAKE SIL HUBLS RND 19FR

## (undated) DEVICE — TIP YANKAUERS BULB NO VENT

## (undated) DEVICE — SEAL CANN UNIVERSAL 5-12MM

## (undated) DEVICE — STRAP CATH ELASTIC HOOK&LOOP

## (undated) DEVICE — BLADE ELECTRO EDGE INSULATED

## (undated) DEVICE — GLOVE SENSICARE PI SURG 6.5

## (undated) DEVICE — CONTAINER SPECIMEN OR STER 4OZ

## (undated) DEVICE — BLADE SURG CARBON STEEL #10

## (undated) DEVICE — SUT 2-0 VICRYL / SH (J417)

## (undated) DEVICE — Device

## (undated) DEVICE — TRAY SKIN SCRUB WET 4 COMPART

## (undated) DEVICE — BAG RECTANGLE RBBRBND 30X36IN

## (undated) DEVICE — TUBE SUCTION MEDI-VAC STERILE

## (undated) DEVICE — SUT MONOCYRL 4-0 PS2 UND

## (undated) DEVICE — WARMER BLUE HEAT SCOPE 3-12MM

## (undated) DEVICE — SUT V-LOC GRN 30CM 12IN 2-0

## (undated) DEVICE — PENCIL ELECTROSURG HOLST W/BLD

## (undated) DEVICE — GLOVE SENSICARE PI SURG 7.5

## (undated) DEVICE — COVER TIP CURVED SCISSORS XI

## (undated) DEVICE — SUT VICRYL+ 27 UR-6 VIOL

## (undated) DEVICE — GLOVE SENSICARE PI GRN 7

## (undated) DEVICE — GOWN NONREINF SET-IN SLV 2XL

## (undated) DEVICE — SOL NACL IRR 1000ML BTL

## (undated) DEVICE — DRIVER NEEDLE SUTURECUT MEGA

## (undated) DEVICE — SODIUM CHLORIDE 0.9% 1000ML

## (undated) DEVICE — TOWEL OR XRAY BLUE 17X26IN

## (undated) DEVICE — OBTURATOR BLADELESS 8MM XI CLR

## (undated) DEVICE — GLOVE SENSICARE PI GRN 8

## (undated) DEVICE — OCCLUDER COLPO-PNEUMO STERILE

## (undated) DEVICE — SYR 10CC LUER LOCK

## (undated) DEVICE — BOWL STERILE LARGE 32OZ

## (undated) DEVICE — TROCAR ENDO Z THREAD KII 5X100

## (undated) DEVICE — FORCEP FENESTRATED BIPOLAR

## (undated) DEVICE — KIT ROBOTIC RUSH

## (undated) DEVICE — STAPLER SKIN ROTATING HEAD

## (undated) DEVICE — SEALER LIGASURE MARYLAND 37CM

## (undated) DEVICE — SOL IRR SOD CHL .9% POUR

## (undated) DEVICE — PAD PINK TRENDELENBURG POS XL

## (undated) DEVICE — SUT 1 48IN PDS II VIO MONO

## (undated) DEVICE — SET CYSTO IRR DRP CHMBR 84IN

## (undated) DEVICE — SET PNEUMOCLEAR HEAT HUM SE HF

## (undated) DEVICE — TAPE DRAPE STRIP CLSR 4.5X24IN

## (undated) DEVICE — SYR 30CC LUER LOCK

## (undated) DEVICE — COUNT NDL FOAM MAGNET 40COUNT